# Patient Record
Sex: FEMALE | Race: WHITE
[De-identification: names, ages, dates, MRNs, and addresses within clinical notes are randomized per-mention and may not be internally consistent; named-entity substitution may affect disease eponyms.]

---

## 2018-11-15 ENCOUNTER — HOSPITAL ENCOUNTER (INPATIENT)
Dept: HOSPITAL 62 - ER | Age: 58
LOS: 7 days | Discharge: HOME | DRG: 387 | End: 2018-11-22
Attending: INTERNAL MEDICINE | Admitting: INTERNAL MEDICINE
Payer: COMMERCIAL

## 2018-11-15 DIAGNOSIS — K57.90: ICD-10-CM

## 2018-11-15 DIAGNOSIS — Z86.010: ICD-10-CM

## 2018-11-15 DIAGNOSIS — E87.6: ICD-10-CM

## 2018-11-15 DIAGNOSIS — Z91.040: ICD-10-CM

## 2018-11-15 DIAGNOSIS — F32.9: ICD-10-CM

## 2018-11-15 DIAGNOSIS — D64.9: ICD-10-CM

## 2018-11-15 DIAGNOSIS — F17.200: ICD-10-CM

## 2018-11-15 DIAGNOSIS — L40.9: ICD-10-CM

## 2018-11-15 DIAGNOSIS — Z90.710: ICD-10-CM

## 2018-11-15 DIAGNOSIS — Z91.048: ICD-10-CM

## 2018-11-15 DIAGNOSIS — Z90.49: ICD-10-CM

## 2018-11-15 DIAGNOSIS — K51.911: Primary | ICD-10-CM

## 2018-11-15 LAB
ADD MANUAL DIFF: NO
ALBUMIN SERPL-MCNC: 3.5 G/DL (ref 3.5–5)
ALP SERPL-CCNC: 151 U/L (ref 38–126)
ALT SERPL-CCNC: 23 U/L (ref 9–52)
ANION GAP SERPL CALC-SCNC: 14 MMOL/L (ref 5–19)
APPEARANCE UR: (no result)
APTT PPP: (no result) S
AST SERPL-CCNC: 27 U/L (ref 14–36)
BASOPHILS # BLD AUTO: 0.1 10^3/UL (ref 0–0.2)
BASOPHILS NFR BLD AUTO: 0.4 % (ref 0–2)
BILIRUB DIRECT SERPL-MCNC: 0.3 MG/DL (ref 0–0.4)
BILIRUB SERPL-MCNC: 0.7 MG/DL (ref 0.2–1.3)
BILIRUB UR QL STRIP: NEGATIVE
BUN SERPL-MCNC: 8 MG/DL (ref 7–20)
CALCIUM: 9 MG/DL (ref 8.4–10.2)
CHLORIDE SERPL-SCNC: 89 MMOL/L (ref 98–107)
CO2 SERPL-SCNC: 37 MMOL/L (ref 22–30)
EOSINOPHIL # BLD AUTO: 0 10^3/UL (ref 0–0.6)
EOSINOPHIL NFR BLD AUTO: 0.3 % (ref 0–6)
ERYTHROCYTE [DISTWIDTH] IN BLOOD BY AUTOMATED COUNT: 12.5 % (ref 11.5–14)
GLUCOSE SERPL-MCNC: 108 MG/DL (ref 75–110)
GLUCOSE UR STRIP-MCNC: NEGATIVE MG/DL
HCT VFR BLD CALC: 40.2 % (ref 36–47)
HGB BLD-MCNC: 13.9 G/DL (ref 12–15.5)
KETONES UR STRIP-MCNC: NEGATIVE MG/DL
LIPASE SERPL-CCNC: 50.4 U/L (ref 23–300)
LIPASE SERPL-CCNC: 50.9 U/L (ref 23–300)
LYMPHOCYTES # BLD AUTO: 1.4 10^3/UL (ref 0.5–4.7)
LYMPHOCYTES NFR BLD AUTO: 8.3 % (ref 13–45)
MCH RBC QN AUTO: 29.5 PG (ref 27–33.4)
MCHC RBC AUTO-ENTMCNC: 34.5 G/DL (ref 32–36)
MCV RBC AUTO: 86 FL (ref 80–97)
MONOCYTES # BLD AUTO: 1.2 10^3/UL (ref 0.1–1.4)
MONOCYTES NFR BLD AUTO: 6.8 % (ref 3–13)
NEUTROPHILS # BLD AUTO: 14.4 10^3/UL (ref 1.7–8.2)
NEUTS SEG NFR BLD AUTO: 84.2 % (ref 42–78)
NITRITE UR QL STRIP: NEGATIVE
PH UR STRIP: 5 [PH] (ref 5–9)
PLATELET # BLD: 524 10^3/UL (ref 150–450)
POTASSIUM SERPL-SCNC: 3 MMOL/L (ref 3.6–5)
PROT SERPL-MCNC: 7 G/DL (ref 6.3–8.2)
PROT UR STRIP-MCNC: 100 MG/DL
RBC # BLD AUTO: 4.71 10^6/UL (ref 3.72–5.28)
SODIUM SERPL-SCNC: 140.4 MMOL/L (ref 137–145)
SP GR UR STRIP: 1.02
TOTAL CELLS COUNTED % (AUTO): 100 %
UROBILINOGEN UR-MCNC: 4 MG/DL (ref ?–2)
WBC # BLD AUTO: 17 10^3/UL (ref 4–10.5)

## 2018-11-15 PROCEDURE — S0119 ONDANSETRON 4 MG: HCPCS

## 2018-11-15 PROCEDURE — 83605 ASSAY OF LACTIC ACID: CPT

## 2018-11-15 PROCEDURE — 36415 COLL VENOUS BLD VENIPUNCTURE: CPT

## 2018-11-15 PROCEDURE — 83690 ASSAY OF LIPASE: CPT

## 2018-11-15 PROCEDURE — 83735 ASSAY OF MAGNESIUM: CPT

## 2018-11-15 PROCEDURE — 45331 SIGMOIDOSCOPY AND BIOPSY: CPT

## 2018-11-15 PROCEDURE — 87045 FECES CULTURE AEROBIC BACT: CPT

## 2018-11-15 PROCEDURE — 87493 C DIFF AMPLIFIED PROBE: CPT

## 2018-11-15 PROCEDURE — 80048 BASIC METABOLIC PNL TOTAL CA: CPT

## 2018-11-15 PROCEDURE — 96375 TX/PRO/DX INJ NEW DRUG ADDON: CPT

## 2018-11-15 PROCEDURE — 99285 EMERGENCY DEPT VISIT HI MDM: CPT

## 2018-11-15 PROCEDURE — 88305 TISSUE EXAM BY PATHOLOGIST: CPT

## 2018-11-15 PROCEDURE — 85027 COMPLETE CBC AUTOMATED: CPT

## 2018-11-15 PROCEDURE — 80053 COMPREHEN METABOLIC PANEL: CPT

## 2018-11-15 PROCEDURE — 87205 SMEAR GRAM STAIN: CPT

## 2018-11-15 PROCEDURE — 96365 THER/PROPH/DIAG IV INF INIT: CPT

## 2018-11-15 PROCEDURE — 85025 COMPLETE CBC W/AUTO DIFF WBC: CPT

## 2018-11-15 PROCEDURE — 87040 BLOOD CULTURE FOR BACTERIA: CPT

## 2018-11-15 PROCEDURE — S0028 INJECTION, FAMOTIDINE, 20 MG: HCPCS

## 2018-11-15 PROCEDURE — 96366 THER/PROPH/DIAG IV INF ADDON: CPT

## 2018-11-15 PROCEDURE — 74177 CT ABD & PELVIS W/CONTRAST: CPT

## 2018-11-15 PROCEDURE — 74018 RADEX ABDOMEN 1 VIEW: CPT

## 2018-11-15 PROCEDURE — 81001 URINALYSIS AUTO W/SCOPE: CPT

## 2018-11-15 RX ADMIN — PIPERACILLIN AND TAZOBACTAM SCH MLS/HR: 3; .375 INJECTION, POWDER, LYOPHILIZED, FOR SOLUTION INTRAVENOUS; PARENTERAL at 22:18

## 2018-11-15 RX ADMIN — SODIUM CHLORIDE, SODIUM LACTATE, POTASSIUM CHLORIDE, AND CALCIUM CHLORIDE PRN MLS/HR: .6; .31; .03; .02 INJECTION, SOLUTION INTRAVENOUS at 21:56

## 2018-11-15 RX ADMIN — FAMOTIDINE SCH MG: 10 INJECTION INTRAVENOUS at 21:56

## 2018-11-15 RX ADMIN — GABAPENTIN SCH MG: 300 CAPSULE ORAL at 18:58

## 2018-11-15 RX ADMIN — FAMOTIDINE SCH: 10 INJECTION INTRAVENOUS at 18:55

## 2018-11-15 RX ADMIN — Medication SCH MG: at 22:18

## 2018-11-15 NOTE — ER DOCUMENT REPORT
ED General





- General


Chief Complaint: Fever


Stated Complaint: ABDOMINAL PAIN


Time Seen by Provider: 11/15/18 11:18


TRAVEL OUTSIDE OF THE [place] IN LAST 30 DAYS: No





- HPI


Notes: 





Patient is a 58-year-old female that presents to the emergency department for 

chief complaint of left lower quadrant abdominal pain.


Patient referred to ER by PCP for IV antibiotics.  Patient has had pain in her 

left lower quadrant since 10/27/18.  She was seen by her primary care doctor 

and told she had colitis.  She has been taking Cipro and Flagyl for the past 2 

days with no improvement of her symptoms.  She reports that sharp left lower 

quadrant pain that has been constant since onset.  Pain is worse with eating.  

She denies relieving factors.  She reports multiple episodes of diarrhea daily.

  She denies history of C. difficile or recent antibiotics and admission to the 

hospital.  She has been febrile and took Tylenol just prior to arrival.





Past Medical History: Negative





Past Surgical History: Colonoscopy





Social History: Denies drugs alcohol and tobacco





Family History: Reviewed and noncontributory for presenting illness





Allergies: Reviewed, see documented allergy list.








REVIEW OF SYSTEMS:





CONSTITUTIONAL : 





No fever





No chills





No diaphoresis





No recent illness





EENT:





No vision changes





No congestion





No sore throat  





CARDIOVASCULAR:





No chest pain





No palpitations





RESPIRATORY:





No shortness of breath





No cough





No difficulty breathing





GASTROINTESTINAL: 





 abdominal pain





 nausea





No vomiting





diarrhea





GENITOURINARY:





No dysuria





No hematuria





No difficulty urinating





MUSCULOSKELETAL:





No back pain





No leg pain





No arm pain





SKIN:  





No rashes





No lesions





LYMPHATIC: 





No swollen, enlarged glands.





NEUROLOGICAL: 





No lightheadedness





No headache





No weakness





No paresthesias





PSYCHIATRIC:





No anxiety





No depression 








PHYSICAL EXAMINATION:





Vital signs reviewed, nursing noted reviewed.





GENERAL: Well-appearing, well-nourished and in no acute distress.





HEAD: Atraumatic, normocephalic.





EYES: Eyes appear normal, extraocular movements intact, sclera anicteric, 

conjunctiva are normal.





ENT: nares patent, oropharynx clear without exudates.  Moist mucous membranes.





NECK: Normal range of motion, supple without lymphadenopathy





LUNGS: Breath sounds clear to auscultation bilaterally and equal.  No wheezes 

rales or rhonchi.





HEART: Regular rate and rhythm without murmurs





ABDOMEN: Soft, left upper and lower quadrant tenderness, normoactive bowel 

sounds.  No rebound, guarding, or rigidity. No masses appreciated.





EXTREMITIES: Nontender, good range of motion, no pitting or edema. 





NEUROLOGICAL: No focal neurological deficits. Moves all extremities 

spontaneously Motor and sensory grossly intact on exam.





PSYCH: Normal mood, normal affect.





SKIN: Warm, Dry, normal turgor, no rashes or lesions noted on exposed skin











- Related Data


Allergies/Adverse Reactions: 


 





latex [Latex] Allergy (Verified 11/15/18 11:00)


 itching


nickel [Nickel] Allergy (Verified 11/15/18 11:00)


 ITCHY RASH


sulfamethoxazole [From Bactrim] Allergy (Verified 11/15/18 11:00)


 red rash


trimethoprim [From Bactrim] Allergy (Verified 11/15/18 11:00)


 red rash


MERCURY Allergy (Uncoded 11/15/18 11:00)


 ITCHY RASH











Past Medical History





- Social History


Smoking Status: Current Some Day Smoker


Chew tobacco use (# tins/day): No


Frequency of alcohol use: None


Drug Abuse: None


Family History: Reviewed & Not Pertinent


Patient has suicidal ideation: No


Patient has homicidal ideation: No





- Past Medical History


Cardiac Medical History: 


   Denies: Hx Coronary Artery Disease, Hx Heart Attack, Hx Hypertension


Pulmonary Medical History: 


   Denies: Hx Asthma, Hx Bronchitis, Hx COPD, Hx Pneumonia


Neurological Medical History: Denies: Hx Cerebrovascular Accident, Hx Seizures


Renal/ Medical History: Denies: Hx Peritoneal Dialysis


GI Medical History: Denies: Hx Hepatitis, Hx Hiatal Hernia, Hx Ulcer


Musculoskeletal Medical History: Reports Hx Arthritis - hands/FEET, Reports Hx 

Musculoskeletal Trauma - Right knee fracture 10 years ago


Psychiatric Medical History: Reports: Hx Depression


Infectious Medical History: Denies: Hx Hepatitis


Past Surgical History: Reports: Hx Cholecystectomy, Hx Hysterectomy, Hx 

Orthopedic Surgery - left thumb.  Denies: Hx Mastectomy, Hx Open Heart Surgery





- Immunizations


Hx Diphtheria, Pertussis, Tetanus Vaccination: Yes





Review of Systems





- Review of Systems


Notes: 





Dictated





Physical Exam





- Vital signs


Vitals: 


 











Temp Pulse Resp BP Pulse Ox


 


 99.5 F   108 H  18   151/70 H  96 


 


 11/15/18 11:04  11/15/18 11:04  11/15/18 11:04  11/15/18 11:04  11/15/18 11:04














- Notes


Notes: 





Dictated





Course





- Re-evaluation


Re-evalutation: 





11/15/18 13:29


Vitals reviewed.  Nursing notes reviewed.  I reviewed patient's CT scan that 

she had obtained as an outpatient.  It showed a significant pancolitis on her 

left side.  She is failing outpatient management.  Patient is meeting sepsis 

criteria.  Blood cultures were obtained.  She was given a dose of vancomycin.  

She was started on IV hydration. potassium replaced. KUB shows no free air or 

perforation.  Patient will be admitted to the hospital for further management.  

She is in agreement with this plan and stable at time of admission.





Laboratory











  11/15/18 11/15/18 11/15/18





  11:45 11:45 11:45


 


WBC  17.0 H  


 


RBC  4.71  


 


Hgb  13.9  


 


Hct  40.2  


 


MCV  86  


 


MCH  29.5  


 


MCHC  34.5  


 


RDW  12.5  


 


Plt Count  524 H  


 


Seg Neutrophils %  84.2 H  


 


Lymphocytes %  8.3 L  


 


Monocytes %  6.8  


 


Eosinophils %  0.3  


 


Basophils %  0.4  


 


Absolute Neutrophils  14.4 H  


 


Absolute Lymphocytes  1.4  


 


Absolute Monocytes  1.2  


 


Absolute Eosinophils  0.0  


 


Absolute Basophils  0.1  


 


Sodium   140.4 


 


Potassium   3.0 L* 


 


Chloride   89 L 


 


Carbon Dioxide   37 H 


 


Anion Gap   14 


 


BUN   8 


 


Creatinine   0.43 L 


 


Est GFR ( Amer)   > 60 


 


Est GFR (Non-Af Amer)   > 60 


 


Glucose   108 


 


Calcium   9.0 


 


Total Bilirubin   0.7 


 


Direct Bilirubin   0.3 


 


Neonat Total Bilirubin   Not Reportable 


 


Neonat Direct Bilirubin   Not Reportable 


 


Neonat Indirect Bili   Not Reportable 


 


AST   27 


 


ALT   23 


 


Alkaline Phosphatase   151 H 


 


Total Protein   7.0 


 


Albumin   3.5 


 


Lipase   50.4 


 


Urine Color    DARK YELLOW


 


Urine Appearance    CLOUDY


 


Urine pH    5.0


 


Ur Specific Gravity    1.021


 


Urine Protein    100 H


 


Urine Glucose (UA)    NEGATIVE


 


Urine Ketones    NEGATIVE


 


Urine Blood    NEGATIVE


 


Urine Nitrite    NEGATIVE


 


Urine Bilirubin    NEGATIVE


 


Urine Urobilinogen    4.0 H


 


Ur Leukocyte Esterase    SMALL H


 


Urine WBC (Auto)    27


 


Urine RBC (Auto)    18


 


U Hyaline Cast (Auto)    2


 


Urine Bacteria (Auto)    2+


 


Squamous Epi Cells Auto    43


 


Urine Mucus (Auto)    MANY


 


Urine Ascorbic Acid    NEGATIVE














 





KUB X-Ray  11/15/18 11:18


IMPRESSION:  NO RADIOGRAPHIC EVIDENCE FOR ACUTE ABDOMINAL DISEASE.


 











11/15/18 14:28


Case discussed with Dr. Peña who accepted admission.  Case discussed with 

Dr. Atwood, surgery, who states he is available for colonoscopy if needed





- Vital Signs


Vital signs: 


 











Temp Pulse Resp BP Pulse Ox


 


 99.5 F   108 H  18   151/70 H  96 


 


 11/15/18 11:04  11/15/18 11:04  11/15/18 11:04  11/15/18 11:04  11/15/18 11:04














- Laboratory


Result Diagrams: 


 11/15/18 11:45





 11/15/18 11:45


Laboratory results interpreted by me: 


 











  11/15/18 11/15/18 11/15/18





  11:45 11:45 11:45


 


WBC  17.0 H  


 


Plt Count  524 H  


 


Seg Neutrophils %  84.2 H  


 


Lymphocytes %  8.3 L  


 


Absolute Neutrophils  14.4 H  


 


Potassium   3.0 L* 


 


Chloride   89 L 


 


Carbon Dioxide   37 H 


 


Creatinine   0.43 L 


 


Alkaline Phosphatase   151 H 


 


Urine Protein    100 H


 


Urine Urobilinogen    4.0 H


 


Ur Leukocyte Esterase    SMALL H














Discharge





- Discharge


Clinical Impression: 


 Colitis, Hypokalemia





Sepsis


Qualifiers:


 Sepsis type: sepsis due to unspecified organism Qualified Code(s): A41.9 - 

Sepsis, unspecified organism





Condition: Stable


Disposition: ADMITTED AS INPATIENT


Admitting Provider: Hospitalist


Unit Admitted: Telemetry


Referrals: 


JARVIS FERRER, CHEPEP-BC [NO LOCAL MD] - Follow up as needed

## 2018-11-15 NOTE — ER DOCUMENT REPORT
ED Medical Screen (RME)





- General


Chief Complaint: Fever


Stated Complaint: COLITUS


Time Seen by Provider: 11/15/18 11:18


Notes: 





58 years old female presents today with left-sided abdominal pain.  Recently 

she had a CT done which showed diffuse colitis involving the entire left side 

of the colon and sigmoid colon.


Denies any fever chills nausea vomiting.


History of psoriasis





Examination left abdominal tenderness


TRAVEL OUTSIDE OF THE [place] IN LAST 30 DAYS: No





- Related Data


Allergies/Adverse Reactions: 


 





latex [Latex] Allergy (Verified 11/15/18 11:00)


 itching


nickel [Nickel] Allergy (Verified 11/15/18 11:00)


 ITCHY RASH


sulfamethoxazole [From Bactrim] Allergy (Verified 11/15/18 11:00)


 red rash


trimethoprim [From Bactrim] Allergy (Verified 11/15/18 11:00)


 red rash


MERCURY Allergy (Uncoded 11/15/18 11:00)


 ITCHY RASH











Past Medical History





- Social History


Chew tobacco use (# tins/day): No


Frequency of alcohol use: None


Drug Abuse: None





- Past Medical History


Cardiac Medical History: 


   Denies: Hx Coronary Artery Disease, Hx Heart Attack, Hx Hypertension


Pulmonary Medical History: 


   Denies: Hx Asthma, Hx Bronchitis, Hx COPD, Hx Pneumonia


Neurological Medical History: Denies: Hx Cerebrovascular Accident, Hx Seizures


Renal/ Medical History: Denies: Hx Peritoneal Dialysis


GI Medical History: Denies: Hx Hepatitis, Hx Hiatal Hernia, Hx Ulcer


Musculoskeltal Medical History: Reports Hx Arthritis - hands/FEET, Reports Hx 

Musculoskeletal Trauma - Right knee fracture 10 years ago


Psychiatric Medical History: Reports: Hx Depression


Infectious Medical History: Denies: Hx Hepatitis


Past Surgical History: Reports: Hx Cholecystectomy, Hx Hysterectomy, Hx 

Orthopedic Surgery - left thumb.  Denies: Hx Mastectomy, Hx Open Heart Surgery





- Immunizations


Hx Diphtheria, Pertussis, Tetanus Vaccination: Yes





Physical Exam





- Vital signs


Vitals: 





 











Temp Pulse Resp BP Pulse Ox


 


 99.5 F   108 H  18   151/70 H  96 


 


 11/15/18 11:04  11/15/18 11:04  11/15/18 11:04  11/15/18 11:04  11/15/18 11:04














Course





- Vital Signs


Vital signs: 





 











Temp Pulse Resp BP Pulse Ox


 


 99.5 F   108 H  18   151/70 H  96 


 


 11/15/18 11:04  11/15/18 11:04  11/15/18 11:04  11/15/18 11:04  11/15/18 11:04














Doctor's Discharge





- Discharge


Referrals: 


JARVIS FERRER, FNP-BC [Primary Care Provider] - Follow up as needed

## 2018-11-15 NOTE — RADIOLOGY REPORT (SQ)
EXAM DESCRIPTION:  CT ABD/PELVIS WITH IV   ORAL



COMPLETED DATE/TIME:  11/15/2018 5:52 pm



REASON FOR STUDY:  abdominal pain



COMPARISON:   None.



TECHNIQUE:  CT scan of the abdomen and pelvis performed using helical scanning technique with dynamic
 intravenous contrast injection and oral contrast. Images reviewed with lung, soft tissue, and bone w
indows. Reconstructed coronal and sagittal MPR images reviewed. Delayed images for evaluation of the 
urinary system also acquired. All images stored on PACS.

All CT scanners at this facility use dose modulation, iterative reconstruction, and/or weight based d
osing when appropriate to reduce radiation dose to as low as reasonably achievable (ALARA).

[facility]: Dose Right  [facility]: CareDose    [facility]: Dose Right    [facility]: Teradose 4D    [facility]: Smart Technologies



CONTRAST TYPE AND DOSE:  contrast/concentration: Isovue 350.00 mg/ml; Total Contrast Delivered: 99.0 
ml; Total Saline Delivered: 72.0 ml



RENAL FUNCTION:  GFR > 60.



RADIATION DOSE:  CT Rad equipment meets quality standard of care and radiation dose reduction techniq
ues were employed. CTDIvol: 16.2 - 19.5 mGy. DLP: 2068 mGy-cm..



LIMITATIONS:  None.



FINDINGS:  LOWER CHEST: Mild bibasilar subsegmental atelectasis-subpleural scarring.  Small lymph nod
es in the right cardiophrenic angle.

LIVER: Normal size. No enhancing masses.  No dilated ducts.

SPLEEN: Normal size. No focal lesions.

PANCREAS: No masses identified. No significant calcifications. No adjacent inflammation or peripancre
atic fluid collections. Pancreatic duct not dilated.

GALLBLADDER: Surgically absent.

ADRENAL GLANDS: No significant masses.

RIGHT KIDNEY AND URETER: No cysts identified. No solid masses identified. No calcified stones. No hyd
ronephrosis or hydroureter.

LEFT KIDNEY AND URETER: No cysts identified. No solid masses identified. No calcified stones. No hydr
onephrosis or hydroureter.

AORTA AND VESSELS: No aneurysm. No dissection. Renal arteries, SMA, celiac without significant stenos
is.

RETROPERITONEUM: Scattered small retroperitoneal lymph nodes.

BOWEL AND PERITONEAL CAVITY: Mild wall thickening in the descending and rectosigmoid colon with mild 
adjacent inflammatory changes and  adjacent small adenopathy.  Trace free fluid.  No evidence for obs
truction.

APPENDIX: Normal.

PELVIS: No mass.  No free fluid. Unremarkable bladder.

ABDOMINAL WALL: No masses. No hernias.

BONES: No acute findings.

OTHER: No other significant finding.



IMPRESSION:  Mild wall thickening in the descending and rectosigmoid colon with mild adjacent inflamm
atory changes and adjacent small adenopathy, differential includes acute infectious process versus in
flammatory bowel disease.  Trace free fluid. No evidence for obstruction.



TECHNICAL DOCUMENTATION:  JOB ID:  [id number]

[id number]

Quality ID # [id number]: Final reports with documentation of one or more dose reduction techniques (e.g., Au
tomated exposure control, adjustment of the mA and/or kV according to patient size, use of iterative 
reconstruction technique)

 2011 [employer]- All Rights Reserved



Reading location - IP/workstation name: [email or web address]

## 2018-11-15 NOTE — PDOC CONSULTATION
History of Present Illness


Admission Date/PCP: 


  11/15/18 14:50





  DEBBIE GUTIERREZ DO





Patient complains of: Abdominal pain and diarrhea


History of Present Illness: 


JARVIS EWLCH is a 58 year old female in usual state of excellent health until 

about 3 weeks ago when she was started on antibiotics for an ear infection.  

About 3 days after starting the antibiotic she developed bloody diarrhea along 

with left lower quadrant abdominal pain.  The pain in the bloody diarrhea has 

been persistent for the past 2-1/2 weeks.  She was seen by a primary care 

physician and was placed on Cipro and Flagyl 3 days ago and since starting 

these 2 antibiotics her abdominal pain has decreased although her bloody 

diarrhea has continued.  She has developed low-grade fevers as well as anorexia 

with resultant weight loss.  She denies any prior history of gastrointestinal 

problems.  There is no family history of gastrointestinal problems, 

specifically no family history of inflammatory bowel disease.  She had a 

colonoscopy done 2 years ago which was only noteworthy for diverticulosis and 

incidental polyp.  She does suffer from psoriatic arthritis but she is not on 

any steroids.  She is on gabapentin.  No stool studies have been performed on 

her yet.  She has no history of hypertension nor diabetes but she is a longtime 

smoker.  No known history of peripheral vascular disease








Past Medical History


Cardiac Medical History: Reports: None


   Denies: Coronary Artery Disease, Myocardial Infarction, Hypertension


Pulmonary Medical History: Reports: None


   Denies: Asthma, Bronchitis, Chronic Obstructive Pulmonary Disease (COPD), 

Pneumonia


EENT Medical History: Reports: Ears - Ear infection 3 weeks ago.


Neurological Medical History: Reports: None


   Denies: Seizures


Endocrine Medical History: Reports: None


Renal/ Medical History: Reports: None


Malignancy Medical History: Reports: None


GI Medical History: Reports: Other - See history of presenting illness.


   Denies: Hepatitis, Hiatal Hernia


Musculoskeltal Medical History: Reports: Arthritis - hands/FEET


Skin Medical History: Reports: Psoriasis


Psychiatric Medical History: Reports: Depression


Hematology: 


   Denies: Anemia, Sickle Cell Disease





Past Surgical History


Past Surgical History: Reports: Cholecystectomy, Hysterectomy, Orthopedic 

Surgery - left thumb


   Denies: Amputation, Mastectomy





Social History


Smoking Status: Current Every Day Smoker


Frequency of Alcohol Use: None


Drugs: None





- Advance Directive


Resuscitation Status: Full Code





Family History


Family History: Reviewed & Not Pertinent


Parental Family History Reviewed: Yes


Children Family History Reviewed: Yes


Sibling(s) Family History Reviewed.: Yes





Medication/Allergy


Home Medications: 








Fluoxetine HCl [Prozac] 40 mg PO QAM 11/15/18 


Gabapentin [Neurontin 300 mg Capsule] 300 mg PO QPM 11/15/18 








Allergies/Adverse Reactions: 


 





latex [Latex] Allergy (Verified 11/15/18 11:00)


 itching


nickel [Nickel] Allergy (Verified 11/15/18 11:00)


 ITCHY RASH


sulfamethoxazole [From Bactrim] Allergy (Verified 11/15/18 11:00)


 red rash


trimethoprim [From Bactrim] Allergy (Verified 11/15/18 11:00)


 red rash


MERCURY Allergy (Uncoded 11/15/18 11:00)


 ITCHY RASH











Review of Systems


All systems: reviewed and no additional remarkable complaints except as stated


Constitutional: PRESENT: anorexia, fever(s)


Ears: PRESENT: as per HPI


Allergic/Immunologic: PRESENT: other - Allergic to Bactrim and mercury and 

nickel





Physical Exam


Vital Signs: 


 











Temp Pulse Resp BP Pulse Ox


 


 99.8 F   86   16   138/51 H  95 


 


 11/15/18 19:15  11/15/18 19:15  11/15/18 19:15  11/15/18 19:15  11/15/18 19:15








 Intake & Output











 11/14/18 11/15/18 11/16/18





 06:59 06:59 06:59


 


Intake Total   1050


 


Balance   1050











General appearance: PRESENT: no acute distress, cooperative


Eye exam: PRESENT: conjunctiva pink


Neck exam: PRESENT: other - Neck supple with no masses and no tenderness


Respiratory exam: PRESENT: clear to auscultation umu


Cardiovascular exam: PRESENT: RRR


Pulses: PRESENT: +2 pedal pulses bilateral


GI/Abdominal exam: PRESENT: other - Soft, nondistended, mild tenderness at the 

left lower and the left lateral mid abdomen without peritoneal signs.


Extremities exam: PRESENT: other - No swelling.  No splinter hemorrhages.


Neurological exam: PRESENT: alert, awake


Psychiatric exam: PRESENT: appropriate affect


Skin exam: PRESENT: warm





Results


Laboratory Results: 


 











  11/15/18





  17:30


 


Lactic Acid  1.1











Impressions: 


 





Abdomen/Pelvis CT  11/15/18 00:00


IMPRESSION:  Mild wall thickening in the descending and rectosigmoid colon with 

mild adjacent inflammatory changes and adjacent small adenopathy, differential 

includes acute infectious process versus inflammatory bowel disease.  Trace 

free fluid. No evidence for obstruction.


 








KUB X-Ray  11/15/18 11:18


IMPRESSION:  NO RADIOGRAPHIC EVIDENCE FOR ACUTE ABDOMINAL DISEASE.


 














Assessment & Plan





- Diagnosis


(1) Colitis


Is this a current diagnosis for this admission?: Yes   


Plan: 


Of unclear etiology.  C. difficile colitis is certainly in the differential 

diagnosis.  Await stool studies.  We will plan colonoscopy tomorrow.  Patient 

has been having persistent diarrhea with no solid bowel movements for days now 

and she would prefer not to undergo a prep and I believe we would get 

sufficient visualization without a prep.  I have discussed with the patient the 

risk and benefits of the procedure including risk of colon injury and bleeding.

## 2018-11-15 NOTE — PDOC H&P
History of Present Illness


Admission Date/PCP: 


  11/15/18 14:50





  DEBBIE GUTIERREZ DO





Patient complains of: Abdominal pain and diarrhea


History of Present Illness: 


JARVIS WELCH is a 58 year old female who presents to the emergency room due to 

acute onset of abdominal pain and bloody diarrhea.  Patient's problems started 

around October 27 after she received a prescription of doxycycline from her 

rheumatologist for psoriasis treatment.  She started having progressive lower 

abdominal pain associated with bloody diarrhea.  She had a CT scan done last 

week by her primary care physician that was positive for colitis in the left 

descending and sigmoid colon up to splenic flexure and may be extending to the 

rectum.  She was treated with oral antibiotics started by 3 days ago but 

without any improvement.  She was seen by her primary care physician today and 

was referred to the emergency room.  She had history of diverticulosis and 

colonic polyps and had a colonoscopy about 2 years ago.








Past Medical History


Cardiac Medical History: 


   Denies: Coronary Artery Disease, Myocardial Infarction, Hypertension


Pulmonary Medical History: 


   Denies: Asthma, Bronchitis, Chronic Obstructive Pulmonary Disease (COPD), 

Pneumonia


Neurological Medical History: 


   Denies: Seizures


GI Medical History: 


   Denies: Hepatitis, Hiatal Hernia


Musculoskeltal Medical History: Reports: Arthritis - hands/FEET


Skin Medical History: Reports: Psoriasis


Psychiatric Medical History: Reports: Depression


Hematology: 


   Denies: Anemia, Sickle Cell Disease





Past Surgical History


Past Surgical History: Reports: Cholecystectomy, Hysterectomy, Orthopedic 

Surgery - left thumb


   Denies: Amputation, Mastectomy





Social History


Smoking Status: Current Some Day Smoker





Family History


Family History: Reviewed & Not Pertinent


Parental Family History Reviewed: Yes


Children Family History Reviewed: Yes


Sibling(s) Family History Reviewed.: Yes





Medication/Allergy


Home Medications: 








Fluoxetine HCl [Prozac] 40 mg PO QAM 11/15/18 


Gabapentin [Neurontin 300 mg Capsule] 300 mg PO QPM 11/15/18 








Allergies/Adverse Reactions: 


 





latex [Latex] Allergy (Verified 11/15/18 11:00)


 itching


nickel [Nickel] Allergy (Verified 11/15/18 11:00)


 ITCHY RASH


sulfamethoxazole [From Bactrim] Allergy (Verified 11/15/18 11:00)


 red rash


trimethoprim [From Bactrim] Allergy (Verified 11/15/18 11:00)


 red rash


MERCURY Allergy (Uncoded 11/15/18 11:00)


 ITCHY RASH











Review of Systems


All systems: reviewed and no additional remarkable complaints except as stated





Physical Exam


Vital Signs: 


 











Temp Pulse Resp BP Pulse Ox


 


 99.5 F   108 H  18   151/70 H  96 


 


 11/15/18 11:04  11/15/18 11:04  11/15/18 11:04  11/15/18 11:04  11/15/18 11:04








 Intake & Output











 11/14/18 11/15/18 11/16/18





 06:59 06:59 06:59


 


Intake Total   1050


 


Balance   1050











General appearance: PRESENT: no acute distress, cooperative, obese


Head exam: PRESENT: atraumatic, normocephalic


Eye exam: PRESENT: EOMI.  ABSENT: conjunctival injection


Ear exam: ABSENT: bleeding, drainage


Mouth exam: PRESENT: neck supple, tongue midline


Throat exam: ABSENT: post pharyngeal erythema


Neck exam: ABSENT: JVD, meningismus, tenderness, tracheostomy


Respiratory exam: PRESENT: clear to auscultation umu.  ABSENT: accessory muscle 

use


Cardiovascular exam: PRESENT: RRR.  ABSENT: diastolic murmur, systolic murmur


Pulses: PRESENT: normal radial pulses


GI/Abdominal exam: PRESENT: normal bowel sounds, soft, tenderness.  ABSENT: 

ascites


Rectal exam: PRESENT: deferred


Extremities exam: ABSENT: joint swelling, pedal edema


Musculoskeletal exam: ABSENT: normal inspection, tenderness


Neurological exam: PRESENT: alert, altered, awake, oriented to person, oriented 

to place, oriented to time, oriented to situation


Psychiatric exam: PRESENT: anxious.  ABSENT: agitated, homicidal ideation, 

suicidal ideation





Results


Impressions: 


 





KUB X-Ray  11/15/18 11:18


IMPRESSION:  NO RADIOGRAPHIC EVIDENCE FOR ACUTE ABDOMINAL DISEASE.


 














Assessment & Plan





- Diagnosis


(1) Colitis


Is this a current diagnosis for this admission?: Yes   


Plan: 


Patient failed outpatient treatment


We will get CT scan with oral and IV contrast


Will allow for ice chips only


We will start IV Zosyn and p.o. vancomycin


Check for C. difficile and check stool culture


Consult surgery for possible colonoscopy








(2) Hypokalemia


Is this a current diagnosis for this admission?: Yes   


Plan: 


Received supplements in the emergency room


Monitor potassium levels








(3) Depression


Is this a current diagnosis for this admission?: Yes   


Plan: 


Continue Prozac and gabapentin

## 2018-11-15 NOTE — RADIOLOGY REPORT (SQ)
EXAM DESCRIPTION:  KUB/ABDOMEN (SINGLE VIEW)



COMPLETED DATE/TIME:  11/15/2018 12:17 pm



REASON FOR STUDY:  Abdominal pain



COMPARISON:  None.



NUMBER OF VIEWS:  One view.



TECHNIQUE:   Supine radiographic image of the abdomen acquired.



LIMITATIONS:  None.



FINDINGS:  BOWEL GAS PATTERN: High attenuation contents within nondilated ascending and transverse co
laura, probably bismuth or oral contrast.

CALCIFICATIONS: No suspicious calcifications.

SOFT TISSUES: No gross mass or suggestion of organomegaly.

HARDWARE: Clips right upper quadrant.

BONES: No bone lesions or fracture.

OTHER: No other significant finding.



IMPRESSION:  NO RADIOGRAPHIC EVIDENCE FOR ACUTE ABDOMINAL DISEASE.



Reading location - IP/workstation name: [email or web address]

## 2018-11-16 LAB
ADD MANUAL DIFF: NO
ANION GAP SERPL CALC-SCNC: 10 MMOL/L (ref 5–19)
BASOPHILS # BLD AUTO: 0.1 10^3/UL (ref 0–0.2)
BASOPHILS NFR BLD AUTO: 0.4 % (ref 0–2)
BUN SERPL-MCNC: 6 MG/DL (ref 7–20)
CALCIUM: 7.9 MG/DL (ref 8.4–10.2)
CHLORIDE SERPL-SCNC: 97 MMOL/L (ref 98–107)
CO2 SERPL-SCNC: 33 MMOL/L (ref 22–30)
EOSINOPHIL # BLD AUTO: 0 10^3/UL (ref 0–0.6)
EOSINOPHIL NFR BLD AUTO: 0.2 % (ref 0–6)
ERYTHROCYTE [DISTWIDTH] IN BLOOD BY AUTOMATED COUNT: 12.5 % (ref 11.5–14)
GLUCOSE SERPL-MCNC: 97 MG/DL (ref 75–110)
HCT VFR BLD CALC: 33.7 % (ref 36–47)
HGB BLD-MCNC: 11.6 G/DL (ref 12–15.5)
LYMPHOCYTES # BLD AUTO: 1.1 10^3/UL (ref 0.5–4.7)
LYMPHOCYTES NFR BLD AUTO: 7.7 % (ref 13–45)
MCH RBC QN AUTO: 29.6 PG (ref 27–33.4)
MCHC RBC AUTO-ENTMCNC: 34.5 G/DL (ref 32–36)
MCV RBC AUTO: 86 FL (ref 80–97)
MONOCYTES # BLD AUTO: 1.1 10^3/UL (ref 0.1–1.4)
MONOCYTES NFR BLD AUTO: 7.9 % (ref 3–13)
NEUTROPHILS # BLD AUTO: 11.5 10^3/UL (ref 1.7–8.2)
NEUTS SEG NFR BLD AUTO: 83.8 % (ref 42–78)
PLATELET # BLD: 403 10^3/UL (ref 150–450)
POTASSIUM SERPL-SCNC: 2.9 MMOL/L (ref 3.6–5)
RBC # BLD AUTO: 3.93 10^6/UL (ref 3.72–5.28)
SODIUM SERPL-SCNC: 140.2 MMOL/L (ref 137–145)
TOTAL CELLS COUNTED % (AUTO): 100 %
WBC # BLD AUTO: 13.8 10^3/UL (ref 4–10.5)

## 2018-11-16 PROCEDURE — 0DBN8ZX EXCISION OF SIGMOID COLON, VIA NATURAL OR ARTIFICIAL OPENING ENDOSCOPIC, DIAGNOSTIC: ICD-10-PCS | Performed by: SURGERY

## 2018-11-16 RX ADMIN — MIDAZOLAM HYDROCHLORIDE ONE MG: 1 INJECTION, SOLUTION INTRAMUSCULAR; INTRAVENOUS at 10:29

## 2018-11-16 RX ADMIN — MIDAZOLAM HYDROCHLORIDE ONE MG: 1 INJECTION, SOLUTION INTRAMUSCULAR; INTRAVENOUS at 10:24

## 2018-11-16 RX ADMIN — PIPERACILLIN AND TAZOBACTAM SCH MLS/HR: 3; .375 INJECTION, POWDER, LYOPHILIZED, FOR SOLUTION INTRAVENOUS; PARENTERAL at 03:18

## 2018-11-16 RX ADMIN — PIPERACILLIN AND TAZOBACTAM SCH MLS/HR: 3; .375 INJECTION, POWDER, LYOPHILIZED, FOR SOLUTION INTRAVENOUS; PARENTERAL at 15:52

## 2018-11-16 RX ADMIN — PIPERACILLIN AND TAZOBACTAM SCH MLS/HR: 3; .375 INJECTION, POWDER, LYOPHILIZED, FOR SOLUTION INTRAVENOUS; PARENTERAL at 11:36

## 2018-11-16 RX ADMIN — FENTANYL CITRATE ONE MCG: 50 INJECTION INTRAMUSCULAR; INTRAVENOUS at 10:22

## 2018-11-16 RX ADMIN — POTASSIUM CHLORIDE SCH MLS/HR: 29.8 INJECTION, SOLUTION INTRAVENOUS at 08:17

## 2018-11-16 RX ADMIN — MIDAZOLAM HYDROCHLORIDE ONE MG: 1 INJECTION, SOLUTION INTRAMUSCULAR; INTRAVENOUS at 10:26

## 2018-11-16 RX ADMIN — FENTANYL CITRATE ONE MCG: 50 INJECTION INTRAMUSCULAR; INTRAVENOUS at 10:16

## 2018-11-16 RX ADMIN — FENTANYL CITRATE ONE MCG: 50 INJECTION INTRAMUSCULAR; INTRAVENOUS at 10:20

## 2018-11-16 RX ADMIN — FAMOTIDINE SCH: 10 INJECTION INTRAVENOUS at 11:50

## 2018-11-16 RX ADMIN — FLUOXETINE SCH: 20 CAPSULE ORAL at 08:46

## 2018-11-16 RX ADMIN — SODIUM CHLORIDE, SODIUM LACTATE, POTASSIUM CHLORIDE, AND CALCIUM CHLORIDE PRN MLS/HR: .6; .31; .03; .02 INJECTION, SOLUTION INTRAVENOUS at 18:58

## 2018-11-16 RX ADMIN — MIDAZOLAM HYDROCHLORIDE ONE MG: 1 INJECTION, SOLUTION INTRAMUSCULAR; INTRAVENOUS at 10:18

## 2018-11-16 RX ADMIN — Medication SCH MG: at 03:18

## 2018-11-16 RX ADMIN — POTASSIUM CHLORIDE SCH MLS/HR: 29.8 INJECTION, SOLUTION INTRAVENOUS at 16:33

## 2018-11-16 RX ADMIN — MIDAZOLAM HYDROCHLORIDE ONE MG: 1 INJECTION, SOLUTION INTRAMUSCULAR; INTRAVENOUS at 10:14

## 2018-11-16 RX ADMIN — GABAPENTIN SCH MG: 300 CAPSULE ORAL at 18:17

## 2018-11-16 RX ADMIN — Medication SCH: at 10:47

## 2018-11-16 RX ADMIN — POTASSIUM CHLORIDE SCH MLS/HR: 29.8 INJECTION, SOLUTION INTRAVENOUS at 13:38

## 2018-11-16 RX ADMIN — PIPERACILLIN AND TAZOBACTAM SCH MLS/HR: 3; .375 INJECTION, POWDER, LYOPHILIZED, FOR SOLUTION INTRAVENOUS; PARENTERAL at 21:17

## 2018-11-16 RX ADMIN — FAMOTIDINE SCH MG: 10 INJECTION INTRAVENOUS at 21:17

## 2018-11-16 RX ADMIN — ENOXAPARIN SODIUM SCH: 40 INJECTION SUBCUTANEOUS at 09:18

## 2018-11-16 NOTE — PDOC PROGRESS REPORT
Subjective


Progress Note for:: 11/16/18


Subjective:: 





I saw the patient before she had a colonoscopy.  She was tolerating ice chips 

with no vomiting.  She continued to have diarrhea.  She is afebrile and white 

count improved.  C. difficile testing is negative.  Colonoscopy was done later 

and I discussed with Dr. Schuster, apparently she had hemorrhagic edematous 

circumferential colitis.


Reason For Visit: 


ABDOMINAL PAIN








Physical Exam


Vital Signs: 


 











Temp Pulse Resp BP Pulse Ox


 


 98.8 F   90   16   136/73 H  92 


 


 11/16/18 08:56  11/16/18 10:55  11/16/18 10:55  11/16/18 10:55  11/16/18 10:55








 Intake & Output











 11/15/18 11/16/18 11/17/18





 06:59 06:59 06:59


 


Intake Total  3805 585


 


Balance  3805 585


 


Weight  202 lb 6.15 oz 











General appearance: PRESENT: no acute distress, cooperative, obese


Head exam: PRESENT: atraumatic, normocephalic


Eye exam: PRESENT: EOMI, PERRLA.  ABSENT: scleral icterus


Ear exam: ABSENT: bleeding, drainage


Mouth exam: PRESENT: neck supple, tongue midline


Neck exam: ABSENT: meningismus, tenderness, tracheostomy


Respiratory exam: PRESENT: clear to auscultation umu.  ABSENT: accessory muscle 

use


Cardiovascular exam: PRESENT: RRR.  ABSENT: diastolic murmur, systolic murmur


Pulses: PRESENT: normal radial pulses


GI/Abdominal exam: PRESENT: normal bowel sounds, soft, tenderness.  ABSENT: 

ascites


Rectal exam: PRESENT: deferred


Neurological exam: PRESENT: alert, altered, awake, oriented to person, oriented 

to place, oriented to time, oriented to situation


Psychiatric exam: PRESENT: anxious.  ABSENT: agitated, homicidal ideation, 

suicidal ideation


Skin exam: PRESENT: dry, normal color.  ABSENT: abrasion, erythema





Results


Laboratory Results: 


 





 11/16/18 06:23 





 11/16/18 06:23 





 











  11/15/18 11/16/18 11/16/18





  17:30 06:23 06:23


 


WBC   13.8 H 


 


RBC   3.93 


 


Hgb   11.6 L D 


 


Hct   33.7 L 


 


MCV   86 


 


MCH   29.6 


 


MCHC   34.5 


 


RDW   12.5 


 


Plt Count   403 


 


Seg Neutrophils %   83.8 H 


 


Lymphocytes %   7.7 L 


 


Monocytes %   7.9 


 


Eosinophils %   0.2 


 


Basophils %   0.4 


 


Absolute Neutrophils   11.5 H 


 


Absolute Lymphocytes   1.1 


 


Absolute Monocytes   1.1 


 


Absolute Eosinophils   0.0 


 


Absolute Basophils   0.1 


 


Sodium    140.2


 


Potassium    2.9 L*


 


Chloride    97 L


 


Carbon Dioxide    33 H


 


Anion Gap    10


 


BUN    6 L


 


Creatinine    0.54


 


Est GFR ( Amer)    > 60


 


Est GFR (Non-Af Amer)    > 60


 


Glucose    97


 


Lactic Acid  1.1  


 


Calcium    7.9 L


 


Magnesium    1.7











Impressions: 


 





Abdomen/Pelvis CT  11/15/18 00:00


IMPRESSION:  Mild wall thickening in the descending and rectosigmoid colon with 

mild adjacent inflammatory changes and adjacent small adenopathy, differential 

includes acute infectious process versus inflammatory bowel disease.  Trace 

free fluid. No evidence for obstruction.


 








KUB X-Ray  11/15/18 11:18


IMPRESSION:  NO RADIOGRAPHIC EVIDENCE FOR ACUTE ABDOMINAL DISEASE.


 














Assessment & Plan





- Diagnosis


(1) Colitis


Is this a current diagnosis for this admission?: Yes   


Plan: 


Patient failed outpatient treatment


Reviewed CT scan with oral and IV contrast


Colonoscopy report shows severe hemorrhagic edematous circumferential colitis


We will try clear liquids today


Continue IV Zosyn and stop p.o. vancomycin since C. difficile test is negative 

and no pseudomembranous colitis


Follow stool culture











(2) Hypokalemia


Is this a current diagnosis for this admission?: Yes   


Plan: 


Continue to replace and monitor potassium levels








(3) Depression


Is this a current diagnosis for this admission?: Yes   


Plan: 


Continue Prozac and gabapentin

## 2018-11-16 NOTE — OPERATIVE REPORT
Operative Report


DATE OF SURGERY: 11/16/18


PREOPERATIVE DIAGNOSIS: Severe colitis left colon


POSTOPERATIVE DIAGNOSIS: Severe acute hemorrhagic colitis left colon.  

Thrombosed left lateral external hemorrhoid


OPERATION: Flexible sigmoidoscopy to 30 cm from the anal verge; cold mucosa 

biopsy sigmoid colon


SURGEON: KRISTIN BRYANT


ANESTHESIA: Moderate Sedation


TISSUE REMOVED OR ALTERED: bx


COMPLICATIONS: 





none


ESTIMATED BLOOD LOSS: scant


INTRAOPERATIVE FINDINGS: see below


PROCEDURE: 





Patient was taken to the preop holding area to the main endoscopy suite fifth 

floor conscious sedation was induced.  Patient placed in left lateral decubitus 

position.





Surgical plan surgical timeout conducted.





Rectal exam revealed multiple external hemorrhoids, edematous.  In the left 

lateral position was a partially thrombosed external hemorrhoid with mildly 

excoriated overlying skin.  Rectal exam revealed no palpable pathology in the 

anorectal canal.





The flexible pediatric colonoscope was advanced to the anorectal canal to 

approximately 30 cm from the anal verge.  The findings were significant for 

diffuse, hemorrhagic, edematous, circumferential colitis.  Because of the 

friability of the tissue, and patient discomfort, I did not advance the scope 

through the narrowed lumen of the sigmoid colon.  Cold forceps biopsy was 

obtained from the rectosigmoid area at approximately 25 cm.  Bleeding was 

minimal.  Specimen was sent for permanent analysis.





Scope was withdrawn for the patient's anal rectal canal.  She tolerated 

procedure well.





Recommendations:





1.  Supportive therapy, replace potassium, consider truncating antibiotics





2.  Clostridium difficile titers negative ; I do not believe this is ischemic 

colitis








3.  We will sign off at this time.  Reconsult if necessary.

## 2018-11-17 LAB
ADD MANUAL DIFF: NO
ANION GAP SERPL CALC-SCNC: 12 MMOL/L (ref 5–19)
BASOPHILS # BLD AUTO: 0 10^3/UL (ref 0–0.2)
BASOPHILS NFR BLD AUTO: 0.2 % (ref 0–2)
BUN SERPL-MCNC: 7 MG/DL (ref 7–20)
CALCIUM: 8.2 MG/DL (ref 8.4–10.2)
CHLORIDE SERPL-SCNC: 100 MMOL/L (ref 98–107)
CO2 SERPL-SCNC: 29 MMOL/L (ref 22–30)
EOSINOPHIL # BLD AUTO: 0 10^3/UL (ref 0–0.6)
EOSINOPHIL NFR BLD AUTO: 0.3 % (ref 0–6)
ERYTHROCYTE [DISTWIDTH] IN BLOOD BY AUTOMATED COUNT: 12.4 % (ref 11.5–14)
GLUCOSE SERPL-MCNC: 77 MG/DL (ref 75–110)
HCT VFR BLD CALC: 33.1 % (ref 36–47)
HGB BLD-MCNC: 11.3 G/DL (ref 12–15.5)
LYMPHOCYTES # BLD AUTO: 1.5 10^3/UL (ref 0.5–4.7)
LYMPHOCYTES NFR BLD AUTO: 10.3 % (ref 13–45)
MCH RBC QN AUTO: 29.4 PG (ref 27–33.4)
MCHC RBC AUTO-ENTMCNC: 34 G/DL (ref 32–36)
MCV RBC AUTO: 86 FL (ref 80–97)
MONOCYTES # BLD AUTO: 1 10^3/UL (ref 0.1–1.4)
MONOCYTES NFR BLD AUTO: 6.6 % (ref 3–13)
NEUTROPHILS # BLD AUTO: 11.9 10^3/UL (ref 1.7–8.2)
NEUTS SEG NFR BLD AUTO: 82.6 % (ref 42–78)
PLATELET # BLD: 429 10^3/UL (ref 150–450)
POTASSIUM SERPL-SCNC: 3.8 MMOL/L (ref 3.6–5)
RBC # BLD AUTO: 3.84 10^6/UL (ref 3.72–5.28)
SODIUM SERPL-SCNC: 141 MMOL/L (ref 137–145)
TOTAL CELLS COUNTED % (AUTO): 100 %
WBC # BLD AUTO: 14.4 10^3/UL (ref 4–10.5)

## 2018-11-17 RX ADMIN — GABAPENTIN SCH MG: 300 CAPSULE ORAL at 17:39

## 2018-11-17 RX ADMIN — FAMOTIDINE SCH MG: 10 INJECTION INTRAVENOUS at 21:21

## 2018-11-17 RX ADMIN — SODIUM CHLORIDE, SODIUM LACTATE, POTASSIUM CHLORIDE, AND CALCIUM CHLORIDE PRN MLS/HR: .6; .31; .03; .02 INJECTION, SOLUTION INTRAVENOUS at 02:53

## 2018-11-17 RX ADMIN — MORPHINE SULFATE PRN MG: 10 INJECTION INTRAMUSCULAR; INTRAVENOUS; SUBCUTANEOUS at 17:40

## 2018-11-17 RX ADMIN — ENOXAPARIN SODIUM SCH MG: 40 INJECTION SUBCUTANEOUS at 09:39

## 2018-11-17 RX ADMIN — FAMOTIDINE SCH MG: 10 INJECTION INTRAVENOUS at 09:39

## 2018-11-17 RX ADMIN — FLUOXETINE SCH MG: 20 CAPSULE ORAL at 08:29

## 2018-11-17 RX ADMIN — PIPERACILLIN AND TAZOBACTAM SCH MLS/HR: 3; .375 INJECTION, POWDER, LYOPHILIZED, FOR SOLUTION INTRAVENOUS; PARENTERAL at 16:19

## 2018-11-17 RX ADMIN — PIPERACILLIN AND TAZOBACTAM SCH MLS/HR: 3; .375 INJECTION, POWDER, LYOPHILIZED, FOR SOLUTION INTRAVENOUS; PARENTERAL at 20:06

## 2018-11-17 RX ADMIN — PIPERACILLIN AND TAZOBACTAM SCH MLS/HR: 3; .375 INJECTION, POWDER, LYOPHILIZED, FOR SOLUTION INTRAVENOUS; PARENTERAL at 02:53

## 2018-11-17 RX ADMIN — PIPERACILLIN AND TAZOBACTAM SCH MLS/HR: 3; .375 INJECTION, POWDER, LYOPHILIZED, FOR SOLUTION INTRAVENOUS; PARENTERAL at 08:33

## 2018-11-17 NOTE — PDOC PROGRESS REPORT
Subjective


Progress Note for:: 11/17/18


Subjective:: 





C. difficile testing is negative.  Colonoscopy was done yesterday and showed 

hemorrhagic edematous circumferential colitis.  Biopsy was done but results are 

pending.  She is still complaining of frequent watery diarrhea as well as left-

sided abdominal pain.  She is afebrile but her white count slightly worsened 

from yesterday.  She is tolerating clear liquid diet.  Oral vancomycin was 

stopped yesterday and she is continued on Zosyn.


Reason For Visit: 


ABDOMINAL PAIN








Physical Exam


Vital Signs: 


 











Temp Pulse Resp BP Pulse Ox


 


 98.9 F   81   16   149/75 H  98 


 


 11/17/18 08:26  11/17/18 08:26  11/17/18 08:26  11/17/18 08:26  11/17/18 08:26








 Intake & Output











 11/16/18 11/17/18 11/18/18





 06:59 06:59 06:59


 


Intake Total 3805 2951 


 


Balance 3805 2951 


 


Weight 202 lb 6.15 oz 203 lb 11.314 oz 











General appearance: PRESENT: no acute distress, cooperative


Head exam: PRESENT: atraumatic, normocephalic


Eye exam: PRESENT: EOMI, PERRLA


Mouth exam: PRESENT: neck supple, tongue midline


Neck exam: ABSENT: meningismus, tenderness, thyromegaly, tracheostomy


Respiratory exam: PRESENT: clear to auscultation umu.  ABSENT: accessory muscle 

use


Cardiovascular exam: PRESENT: diastolic murmur, RRR, systolic murmur


GI/Abdominal exam: PRESENT: normal bowel sounds, soft, tenderness.  ABSENT: 

ascites


Neurological exam: PRESENT: alert, altered, awake, oriented to person, oriented 

to place, oriented to time, oriented to situation


Psychiatric exam: PRESENT: appropriate affect.  ABSENT: agitated, anxious, 

homicidal ideation, suicidal ideation





Results


Laboratory Results: 


 





 11/17/18 05:14 





 11/17/18 05:14 





 











  11/17/18 11/17/18





  05:14 05:14


 


WBC  14.4 H 


 


RBC  3.84 


 


Hgb  11.3 L 


 


Hct  33.1 L 


 


MCV  86 


 


MCH  29.4 


 


MCHC  34.0 


 


RDW  12.4 


 


Plt Count  429 


 


Seg Neutrophils %  82.6 H 


 


Lymphocytes %  10.3 L 


 


Monocytes %  6.6 


 


Eosinophils %  0.3 


 


Basophils %  0.2 


 


Absolute Neutrophils  11.9 H 


 


Absolute Lymphocytes  1.5 


 


Absolute Monocytes  1.0 


 


Absolute Eosinophils  0.0 


 


Absolute Basophils  0.0 


 


Sodium   141.0


 


Potassium   3.8


 


Chloride   100


 


Carbon Dioxide   29


 


Anion Gap   12


 


BUN   7


 


Creatinine   0.45 L


 


Est GFR ( Amer)   > 60


 


Est GFR (Non-Af Amer)   > 60


 


Glucose   77


 


Calcium   8.2 L











Impressions: 


 





Abdomen/Pelvis CT  11/15/18 00:00


IMPRESSION:  Mild wall thickening in the descending and rectosigmoid colon with 

mild adjacent inflammatory changes and adjacent small adenopathy, differential 

includes acute infectious process versus inflammatory bowel disease.  Trace 

free fluid. No evidence for obstruction.


 








KUB X-Ray  11/15/18 11:18


IMPRESSION:  NO RADIOGRAPHIC EVIDENCE FOR ACUTE ABDOMINAL DISEASE.


 














Assessment & Plan





- Diagnosis


(1) Colitis


Is this a current diagnosis for this admission?: Yes   


Plan: 


Patient failed outpatient treatment


CT scan with oral and IV contrast was done on admission and showed mild wall 

thickening of the descending and sigmoid colon with adjacent inflammatory 

response


Colonoscopy report shows severe hemorrhagic edematous circumferential colitis.  

Biopsy is pending.


Continue clear liquid diet


Continue IV Zosyn 


C. difficile test was negative and we stopped p.o. vancomycin yesterday 


Follow stool culture











(2) Hypokalemia


Is this a current diagnosis for this admission?: Yes   


Plan: 


Improved after replacement


Continue to monitor








(3) Depression


Is this a current diagnosis for this admission?: Yes   


Plan: 


Continue Prozac and gabapentin

## 2018-11-18 LAB
ADD MANUAL DIFF: NO
ANION GAP SERPL CALC-SCNC: 11 MMOL/L (ref 5–19)
BASOPHILS # BLD AUTO: 0 10^3/UL (ref 0–0.2)
BASOPHILS NFR BLD AUTO: 0.2 % (ref 0–2)
BUN SERPL-MCNC: 4 MG/DL (ref 7–20)
CALCIUM: 8.1 MG/DL (ref 8.4–10.2)
CHLORIDE SERPL-SCNC: 95 MMOL/L (ref 98–107)
CO2 SERPL-SCNC: 32 MMOL/L (ref 22–30)
EOSINOPHIL # BLD AUTO: 0 10^3/UL (ref 0–0.6)
EOSINOPHIL NFR BLD AUTO: 0.4 % (ref 0–6)
ERYTHROCYTE [DISTWIDTH] IN BLOOD BY AUTOMATED COUNT: 12.6 % (ref 11.5–14)
GLUCOSE SERPL-MCNC: 94 MG/DL (ref 75–110)
HCT VFR BLD CALC: 32.5 % (ref 36–47)
HGB BLD-MCNC: 11.3 G/DL (ref 12–15.5)
LYMPHOCYTES # BLD AUTO: 1.3 10^3/UL (ref 0.5–4.7)
LYMPHOCYTES NFR BLD AUTO: 10.5 % (ref 13–45)
MCH RBC QN AUTO: 29.5 PG (ref 27–33.4)
MCHC RBC AUTO-ENTMCNC: 34.8 G/DL (ref 32–36)
MCV RBC AUTO: 85 FL (ref 80–97)
MONOCYTES # BLD AUTO: 0.8 10^3/UL (ref 0.1–1.4)
MONOCYTES NFR BLD AUTO: 6.3 % (ref 3–13)
NEUTROPHILS # BLD AUTO: 10.4 10^3/UL (ref 1.7–8.2)
NEUTS SEG NFR BLD AUTO: 82.6 % (ref 42–78)
PLATELET # BLD: 422 10^3/UL (ref 150–450)
POTASSIUM SERPL-SCNC: 3 MMOL/L (ref 3.6–5)
RBC # BLD AUTO: 3.83 10^6/UL (ref 3.72–5.28)
SODIUM SERPL-SCNC: 138.2 MMOL/L (ref 137–145)
TOTAL CELLS COUNTED % (AUTO): 100 %
WBC # BLD AUTO: 12.5 10^3/UL (ref 4–10.5)

## 2018-11-18 RX ADMIN — FAMOTIDINE SCH MG: 10 INJECTION INTRAVENOUS at 21:59

## 2018-11-18 RX ADMIN — PIPERACILLIN AND TAZOBACTAM SCH MLS/HR: 3; .375 INJECTION, POWDER, LYOPHILIZED, FOR SOLUTION INTRAVENOUS; PARENTERAL at 21:59

## 2018-11-18 RX ADMIN — POTASSIUM CHLORIDE SCH MLS/HR: 29.8 INJECTION, SOLUTION INTRAVENOUS at 18:58

## 2018-11-18 RX ADMIN — POTASSIUM CHLORIDE SCH MLS/HR: 29.8 INJECTION, SOLUTION INTRAVENOUS at 11:46

## 2018-11-18 RX ADMIN — FLUOXETINE SCH MG: 20 CAPSULE ORAL at 09:20

## 2018-11-18 RX ADMIN — PIPERACILLIN AND TAZOBACTAM SCH MLS/HR: 3; .375 INJECTION, POWDER, LYOPHILIZED, FOR SOLUTION INTRAVENOUS; PARENTERAL at 09:22

## 2018-11-18 RX ADMIN — PIPERACILLIN AND TAZOBACTAM SCH MLS/HR: 3; .375 INJECTION, POWDER, LYOPHILIZED, FOR SOLUTION INTRAVENOUS; PARENTERAL at 04:38

## 2018-11-18 RX ADMIN — MORPHINE SULFATE PRN MG: 10 INJECTION INTRAMUSCULAR; INTRAVENOUS; SUBCUTANEOUS at 09:45

## 2018-11-18 RX ADMIN — POTASSIUM CHLORIDE SCH MLS/HR: 29.8 INJECTION, SOLUTION INTRAVENOUS at 14:24

## 2018-11-18 RX ADMIN — FAMOTIDINE SCH MG: 10 INJECTION INTRAVENOUS at 09:21

## 2018-11-18 RX ADMIN — ENOXAPARIN SODIUM SCH MG: 40 INJECTION SUBCUTANEOUS at 09:21

## 2018-11-18 RX ADMIN — PIPERACILLIN AND TAZOBACTAM SCH MLS/HR: 3; .375 INJECTION, POWDER, LYOPHILIZED, FOR SOLUTION INTRAVENOUS; PARENTERAL at 17:11

## 2018-11-18 RX ADMIN — GABAPENTIN SCH MG: 300 CAPSULE ORAL at 17:33

## 2018-11-18 NOTE — PDOC PROGRESS REPORT
Subjective


Progress Note for:: 11/18/18


Subjective:: 





Patient is still complaining of watery diarrhea but actually improved from 

presentation.  She still has some left-sided abdominal pain but this is also 

better.  She is afebrile and her white count is improving.


Reason For Visit: 


ABDOMINAL PAIN








Physical Exam


Vital Signs: 


 











Temp Pulse Resp BP Pulse Ox


 


 98.2 F   75   19   138/58 H  99 


 


 11/17/18 23:15  11/17/18 23:15  11/17/18 23:15  11/17/18 23:15  11/17/18 23:15








 Intake & Output











 11/17/18 11/18/18 11/19/18





 06:59 06:59 06:59


 


Intake Total 2951 2316 


 


Balance 2951 2316 


 


Weight 203 lb 11.314 oz 214 lb 15.211 oz 











General appearance: PRESENT: no acute distress, cooperative, obese


Head exam: PRESENT: atraumatic, normocephalic


Eye exam: PRESENT: EOMI, PERRLA


Mouth exam: PRESENT: neck supple, tongue midline


Neck exam: ABSENT: meningismus, tracheostomy


Respiratory exam: PRESENT: clear to auscultation umu.  ABSENT: accessory muscle 

use


Cardiovascular exam: PRESENT: diastolic murmur, RRR, systolic murmur


Pulses: PRESENT: normal radial pulses


GI/Abdominal exam: PRESENT: normal bowel sounds, soft, tenderness - Left-sided.

  ABSENT: ascites


Rectal exam: PRESENT: deferred


Neurological exam: PRESENT: alert, altered, awake, oriented to person, oriented 

to place, oriented to time, oriented to situation





Results


Laboratory Results: 


 





 11/18/18 05:45 





 11/18/18 05:45 





 











  11/18/18 11/18/18





  05:45 05:45


 


WBC   12.5 H


 


RBC   3.83


 


Hgb   11.3 L


 


Hct   32.5 L


 


MCV   85


 


MCH   29.5


 


MCHC   34.8


 


RDW   12.6


 


Plt Count   422


 


Seg Neutrophils %   82.6 H


 


Lymphocytes %   10.5 L


 


Monocytes %   6.3


 


Eosinophils %   0.4


 


Basophils %   0.2


 


Absolute Neutrophils   10.4 H


 


Absolute Lymphocytes   1.3


 


Absolute Monocytes   0.8


 


Absolute Eosinophils   0.0


 


Absolute Basophils   0.0


 


Sodium  138.2 


 


Potassium  3.0 L* 


 


Chloride  95 L 


 


Carbon Dioxide  32 H 


 


Anion Gap  11 


 


BUN  4 L 


 


Creatinine  0.39 L 


 


Est GFR ( Amer)  > 60 


 


Est GFR (Non-Af Amer)  > 60 


 


Glucose  94 


 


Calcium  8.1 L 


 


Magnesium  1.7 








 





11/15/18 21:50   Stool - Stool    - Final


11/15/18 21:50   Stool - Stool   Stool Culture - Final


                            NO SALMONELLA, SHIGELLA, CAMPYLOBACTER, OR E.COLI 

[id number]


                            RECOVERED.


                            NEGATIVE FOR SHIGA TOXINS 1&2.








Impressions: 


 





Abdomen/Pelvis CT  11/15/18 00:00


IMPRESSION:  Mild wall thickening in the descending and rectosigmoid colon with 

mild adjacent inflammatory changes and adjacent small adenopathy, differential 

includes acute infectious process versus inflammatory bowel disease.  Trace 

free fluid. No evidence for obstruction.


 








KUB X-Ray  11/15/18 11:18


IMPRESSION:  NO RADIOGRAPHIC EVIDENCE FOR ACUTE ABDOMINAL DISEASE.


 














Assessment & Plan





- Diagnosis


(1) Colitis


Is this a current diagnosis for this admission?: Yes   


Plan: 


Patient failed outpatient treatment


CT scan with oral and IV contrast was done on admission and showed mild wall 

thickening of the descending and sigmoid colon with adjacent inflammatory 

response


Colonoscopy report shows severe hemorrhagic edematous circumferential colitis.  

Biopsy is pending.


Continue clear liquid diet


Continue IV Zosyn 


C. difficile test was negative and we stopped p.o. vancomycin November 16, 2018


Stool culture is negative











(2) Hypokalemia


Is this a current diagnosis for this admission?: Yes   


Plan: 


Replace with IV potassium and monitor levels in the morning


Also check magnesium level








(3) Depression


Is this a current diagnosis for this admission?: Yes   


Plan: 


Continue Prozac and gabapentin

## 2018-11-19 LAB
ALBUMIN SERPL-MCNC: 2.5 G/DL (ref 3.5–5)
ALP SERPL-CCNC: 102 U/L (ref 38–126)
ALT SERPL-CCNC: 30 U/L (ref 9–52)
ANION GAP SERPL CALC-SCNC: 13 MMOL/L (ref 5–19)
AST SERPL-CCNC: 32 U/L (ref 14–36)
BILIRUB DIRECT SERPL-MCNC: 0.3 MG/DL (ref 0–0.4)
BILIRUB SERPL-MCNC: 0.6 MG/DL (ref 0.2–1.3)
BUN SERPL-MCNC: 4 MG/DL (ref 7–20)
CALCIUM: 8 MG/DL (ref 8.4–10.2)
CHLORIDE SERPL-SCNC: 96 MMOL/L (ref 98–107)
CO2 SERPL-SCNC: 30 MMOL/L (ref 22–30)
ERYTHROCYTE [DISTWIDTH] IN BLOOD BY AUTOMATED COUNT: 12.2 % (ref 11.5–14)
GLUCOSE SERPL-MCNC: 90 MG/DL (ref 75–110)
HCT VFR BLD CALC: 31.4 % (ref 36–47)
HGB BLD-MCNC: 11 G/DL (ref 12–15.5)
MCH RBC QN AUTO: 29.5 PG (ref 27–33.4)
MCHC RBC AUTO-ENTMCNC: 34.9 G/DL (ref 32–36)
MCV RBC AUTO: 85 FL (ref 80–97)
PLATELET # BLD: 412 10^3/UL (ref 150–450)
POTASSIUM SERPL-SCNC: 3.1 MMOL/L (ref 3.6–5)
PROT SERPL-MCNC: 5.3 G/DL (ref 6.3–8.2)
RBC # BLD AUTO: 3.71 10^6/UL (ref 3.72–5.28)
SODIUM SERPL-SCNC: 138.5 MMOL/L (ref 137–145)
WBC # BLD AUTO: 11.6 10^3/UL (ref 4–10.5)

## 2018-11-19 RX ADMIN — PIPERACILLIN AND TAZOBACTAM SCH MLS/HR: 3; .375 INJECTION, POWDER, LYOPHILIZED, FOR SOLUTION INTRAVENOUS; PARENTERAL at 09:57

## 2018-11-19 RX ADMIN — PIPERACILLIN AND TAZOBACTAM SCH MLS/HR: 3; .375 INJECTION, POWDER, LYOPHILIZED, FOR SOLUTION INTRAVENOUS; PARENTERAL at 17:01

## 2018-11-19 RX ADMIN — PIPERACILLIN AND TAZOBACTAM SCH MLS/HR: 3; .375 INJECTION, POWDER, LYOPHILIZED, FOR SOLUTION INTRAVENOUS; PARENTERAL at 23:03

## 2018-11-19 RX ADMIN — FLUOXETINE SCH MG: 20 CAPSULE ORAL at 05:11

## 2018-11-19 RX ADMIN — DEXAMETHASONE SODIUM PHOSPHATE PRN MG: 10 INJECTION INTRAMUSCULAR; INTRAVENOUS at 17:05

## 2018-11-19 RX ADMIN — POTASSIUM CHLORIDE SCH MLS/HR: 29.8 INJECTION, SOLUTION INTRAVENOUS at 09:57

## 2018-11-19 RX ADMIN — FAMOTIDINE SCH MG: 10 INJECTION INTRAVENOUS at 23:03

## 2018-11-19 RX ADMIN — POTASSIUM CHLORIDE SCH: 29.8 INJECTION, SOLUTION INTRAVENOUS at 18:32

## 2018-11-19 RX ADMIN — SODIUM CHLORIDE, SODIUM LACTATE, POTASSIUM CHLORIDE, AND CALCIUM CHLORIDE PRN MLS/HR: .6; .31; .03; .02 INJECTION, SOLUTION INTRAVENOUS at 02:39

## 2018-11-19 RX ADMIN — ENOXAPARIN SODIUM SCH MG: 40 INJECTION SUBCUTANEOUS at 09:57

## 2018-11-19 RX ADMIN — PIPERACILLIN AND TAZOBACTAM SCH MLS/HR: 3; .375 INJECTION, POWDER, LYOPHILIZED, FOR SOLUTION INTRAVENOUS; PARENTERAL at 02:38

## 2018-11-19 RX ADMIN — GABAPENTIN SCH MG: 300 CAPSULE ORAL at 17:02

## 2018-11-19 RX ADMIN — MORPHINE SULFATE PRN MG: 10 INJECTION INTRAMUSCULAR; INTRAVENOUS; SUBCUTANEOUS at 17:14

## 2018-11-19 RX ADMIN — POTASSIUM CHLORIDE SCH MLS/HR: 29.8 INJECTION, SOLUTION INTRAVENOUS at 14:20

## 2018-11-19 RX ADMIN — FAMOTIDINE SCH MG: 10 INJECTION INTRAVENOUS at 09:58

## 2018-11-19 NOTE — PDOC PROGRESS REPORT
Subjective


Progress Note for:: 11/19/18


Subjective:: 





Still has watery diarrhea sometimes with a tinge of blood but overall 

improving.  Pain also improved.  Her white count improving.  She is tolerating 

clear liquid and asking to advance her diet.


Reason For Visit: 


ABDOMINAL PAIN








Physical Exam


Vital Signs: 


 











Temp Pulse Resp BP Pulse Ox


 


 99.8 F   86   18   146/72 H  98 


 


 11/19/18 07:46  11/19/18 07:46  11/19/18 07:46  11/19/18 07:46  11/19/18 07:46








 Intake & Output











 11/18/18 11/19/18 11/20/18





 06:59 06:59 06:59


 


Intake Total 2316 1367 


 


Balance 2316 1367 


 


Weight 214 lb 15.211 oz 214 lb 15.211 oz 











General appearance: PRESENT: no acute distress, cooperative


Head exam: PRESENT: atraumatic, normocephalic


Eye exam: PRESENT: EOMI, PERRLA


Ear exam: ABSENT: bleeding, drainage


Mouth exam: PRESENT: neck supple, tongue midline


Neck exam: ABSENT: meningismus, tracheostomy


Respiratory exam: PRESENT: clear to auscultation umu.  ABSENT: accessory muscle 

use


Cardiovascular exam: PRESENT: RRR.  ABSENT: diastolic murmur, systolic murmur


Pulses: PRESENT: normal radial pulses


GI/Abdominal exam: PRESENT: normal bowel sounds, soft, tenderness.  ABSENT: 

ascites


Rectal exam: PRESENT: deferred


Neurological exam: PRESENT: alert, altered, awake, oriented to person, oriented 

to place, oriented to time, oriented to situation


Psychiatric exam: PRESENT: anxious.  ABSENT: agitated, homicidal ideation, 

suicidal ideation





Results


Laboratory Results: 


 





 11/19/18 05:49 





 11/19/18 05:49 





 











  11/19/18 11/19/18





  05:49 05:49


 


WBC  11.6 H 


 


RBC  3.71 L 


 


Hgb  11.0 L 


 


Hct  31.4 L 


 


MCV  85 


 


MCH  29.5 


 


MCHC  34.9 


 


RDW  12.2 


 


Plt Count  412 


 


Sodium   138.5


 


Potassium   3.1 L


 


Chloride   96 L


 


Carbon Dioxide   30


 


Anion Gap   13


 


BUN   4 L


 


Creatinine   0.38 L


 


Est GFR ( Amer)   > 60


 


Est GFR (Non-Af Amer)   > 60


 


Glucose   90


 


Calcium   8.0 L


 


Magnesium   1.6


 


Total Bilirubin   0.6


 


AST   32


 


ALT   30


 


Alkaline Phosphatase   102


 


Total Protein   5.3 L


 


Albumin   2.5 L











Impressions: 


 





Abdomen/Pelvis CT  11/15/18 00:00


IMPRESSION:  Mild wall thickening in the descending and rectosigmoid colon with 

mild adjacent inflammatory changes and adjacent small adenopathy, differential 

includes acute infectious process versus inflammatory bowel disease.  Trace 

free fluid. No evidence for obstruction.


 








KUB X-Ray  11/15/18 11:18


IMPRESSION:  NO RADIOGRAPHIC EVIDENCE FOR ACUTE ABDOMINAL DISEASE.


 














Assessment & Plan





- Diagnosis


(1) Colitis


Is this a current diagnosis for this admission?: Yes   


Plan: 


Patient failed outpatient treatment


CT scan with oral and IV contrast was done on admission and showed mild wall 

thickening of the descending and sigmoid colon with adjacent inflammatory 

response


Colonoscopy report shows severe hemorrhagic edematous circumferential colitis.  

Biopsy is still pending.


Continue IV Zosyn 


C. difficile test was negative and we stopped p.o. vancomycin November 16, 2018


Stool culture is negative


Will advance to full liquid diet











(2) Hypokalemia


Is this a current diagnosis for this admission?: Yes   


Plan: 


Replace with IV potassium and monitor levels in the morning











(3) Depression


Is this a current diagnosis for this admission?: Yes   


Plan: 


Continue Prozac and gabapentin

## 2018-11-20 LAB
ANION GAP SERPL CALC-SCNC: 9 MMOL/L (ref 5–19)
BUN SERPL-MCNC: 3 MG/DL (ref 7–20)
CALCIUM: 7.9 MG/DL (ref 8.4–10.2)
CHLORIDE SERPL-SCNC: 96 MMOL/L (ref 98–107)
CO2 SERPL-SCNC: 33 MMOL/L (ref 22–30)
ERYTHROCYTE [DISTWIDTH] IN BLOOD BY AUTOMATED COUNT: 12.5 % (ref 11.5–14)
GLUCOSE SERPL-MCNC: 98 MG/DL (ref 75–110)
HCT VFR BLD CALC: 30.4 % (ref 36–47)
HGB BLD-MCNC: 10.5 G/DL (ref 12–15.5)
MCH RBC QN AUTO: 29.3 PG (ref 27–33.4)
MCHC RBC AUTO-ENTMCNC: 34.6 G/DL (ref 32–36)
MCV RBC AUTO: 85 FL (ref 80–97)
PLATELET # BLD: 429 10^3/UL (ref 150–450)
POTASSIUM SERPL-SCNC: 3.1 MMOL/L (ref 3.6–5)
RBC # BLD AUTO: 3.58 10^6/UL (ref 3.72–5.28)
SODIUM SERPL-SCNC: 138.4 MMOL/L (ref 137–145)
WBC # BLD AUTO: 12.8 10^3/UL (ref 4–10.5)

## 2018-11-20 RX ADMIN — DEXAMETHASONE SODIUM PHOSPHATE PRN MG: 10 INJECTION INTRAMUSCULAR; INTRAVENOUS at 17:38

## 2018-11-20 RX ADMIN — PIPERACILLIN AND TAZOBACTAM SCH MLS/HR: 3; .375 INJECTION, POWDER, LYOPHILIZED, FOR SOLUTION INTRAVENOUS; PARENTERAL at 22:44

## 2018-11-20 RX ADMIN — DEXAMETHASONE SODIUM PHOSPHATE PRN MG: 10 INJECTION INTRAMUSCULAR; INTRAVENOUS at 10:34

## 2018-11-20 RX ADMIN — FLUOXETINE SCH MG: 20 CAPSULE ORAL at 05:51

## 2018-11-20 RX ADMIN — PIPERACILLIN AND TAZOBACTAM SCH MLS/HR: 3; .375 INJECTION, POWDER, LYOPHILIZED, FOR SOLUTION INTRAVENOUS; PARENTERAL at 10:59

## 2018-11-20 RX ADMIN — FAMOTIDINE SCH MG: 10 INJECTION INTRAVENOUS at 22:43

## 2018-11-20 RX ADMIN — MORPHINE SULFATE PRN MG: 10 INJECTION INTRAMUSCULAR; INTRAVENOUS; SUBCUTANEOUS at 10:33

## 2018-11-20 RX ADMIN — POTASSIUM CHLORIDE SCH: 29.8 INJECTION, SOLUTION INTRAVENOUS at 19:20

## 2018-11-20 RX ADMIN — FAMOTIDINE SCH MG: 10 INJECTION INTRAVENOUS at 10:58

## 2018-11-20 RX ADMIN — POTASSIUM CHLORIDE SCH MLS/HR: 29.8 INJECTION, SOLUTION INTRAVENOUS at 13:33

## 2018-11-20 RX ADMIN — GABAPENTIN SCH MG: 300 CAPSULE ORAL at 17:13

## 2018-11-20 RX ADMIN — PIPERACILLIN AND TAZOBACTAM SCH MLS/HR: 3; .375 INJECTION, POWDER, LYOPHILIZED, FOR SOLUTION INTRAVENOUS; PARENTERAL at 03:58

## 2018-11-20 RX ADMIN — ENOXAPARIN SODIUM SCH MG: 40 INJECTION SUBCUTANEOUS at 10:58

## 2018-11-20 RX ADMIN — PIPERACILLIN AND TAZOBACTAM SCH MLS/HR: 3; .375 INJECTION, POWDER, LYOPHILIZED, FOR SOLUTION INTRAVENOUS; PARENTERAL at 17:04

## 2018-11-20 NOTE — PDOC PROGRESS REPORT
Subjective


Progress Note for:: 11/20/18


Subjective:: 


The patient is a 58-year-old female with a past medical history of arthritis, 

psoriasis, and depression who was admitted 11/15/2018 for colitis that failed 

outpatient treatment.





Patient was seen on morning rounds.  She was found resting in bed comfortably 

on room air.  She reports that she continues to have loose stools; twice since 

early this morning.  She noted today that she again had bright red blood with 

her bowel movements.  She continues to endorse generalized abdominal discomfort

, anorexia, and nausea, though no emesis.  She did tolerate a clear liquid diet 

this morning.


She denies fever, chills, chest pain, palpitations, dyspnea, orthopnea, urinary 

symptoms.


She has no new questions or concerns today.


No concerns per nursing.


Reason For Visit: 


ABDOMINAL PAIN








Physical Exam


Vital Signs: 


 











Temp Pulse Resp BP Pulse Ox


 


 98.6 F   78   19   138/60 H  94 


 


 11/20/18 11:39  11/20/18 11:39  11/20/18 11:39  11/20/18 11:39  11/20/18 11:39








 Intake & Output











 11/19/18 11/20/18 11/21/18





 06:59 06:59 06:59


 


Intake Total 1367 1357 1132


 


Balance 1367 1357 1132


 


Weight 97.5 kg 97.5 kg 











General appearance: PRESENT: no acute distress, well-developed, well-nourished


Head exam: PRESENT: atraumatic, normocephalic


Eye exam: PRESENT: conjunctiva pink, EOMI, PERRLA.  ABSENT: scleral icterus


Ear exam: PRESENT: normal external ear exam


Mouth exam: PRESENT: moist, tongue midline


Neck exam: ABSENT: carotid bruit, JVD, lymphadenopathy, thyromegaly


Respiratory exam: PRESENT: clear to auscultation umu.  ABSENT: rales, rhonchi, 

wheezes


Cardiovascular exam: PRESENT: RRR.  ABSENT: diastolic murmur, rubs, systolic 

murmur


Pulses: PRESENT: normal dorsalis pedis pul


Vascular exam: PRESENT: normal capillary refill


GI/Abdominal exam: PRESENT: normal bowel sounds, soft, tenderness.  ABSENT: 

distended, guarding, mass, organolmegaly, rebound


Rectal exam: PRESENT: deferred


Extremities exam: PRESENT: full ROM, +1 edema - BLE.  ABSENT: calf tenderness, 

clubbing, pedal edema


Neurological exam: PRESENT: alert, awake, oriented to person, oriented to place

, oriented to time, oriented to situation, CN II-XII grossly intact.  ABSENT: 

motor sensory deficit


Psychiatric exam: PRESENT: appropriate affect, normal mood.  ABSENT: homicidal 

ideation, suicidal ideation


Skin exam: PRESENT: dry, intact, warm.  ABSENT: cyanosis, rash





Results


Laboratory Results: 


 





 11/20/18 05:06 





 11/20/18 05:06 





 











  11/20/18 11/20/18





  05:06 05:06


 


WBC  12.8 H 


 


RBC  3.58 L 


 


Hgb  10.5 L 


 


Hct  30.4 L 


 


MCV  85 


 


MCH  29.3 


 


MCHC  34.6 


 


RDW  12.5 


 


Plt Count  429 


 


Sodium   138.4


 


Potassium   3.1 L


 


Chloride   96 L


 


Carbon Dioxide   33 H


 


Anion Gap   9


 


BUN   3 L


 


Creatinine   0.43 L


 


Est GFR ( Amer)   > 60


 


Est GFR (Non-Af Amer)   > 60


 


Glucose   98


 


Calcium   7.9 L


 


Magnesium   1.6











Impressions: 


 





Abdomen/Pelvis CT  11/15/18 00:00


IMPRESSION:  Mild wall thickening in the descending and rectosigmoid colon with 

mild adjacent inflammatory changes and adjacent small adenopathy, differential 

includes acute infectious process versus inflammatory bowel disease.  Trace 

free fluid. No evidence for obstruction.


 








KUB X-Ray  11/15/18 11:18


IMPRESSION:  NO RADIOGRAPHIC EVIDENCE FOR ACUTE ABDOMINAL DISEASE.


 














Assessment & Plan





- Diagnosis


(1) Ulcerative colitis


Qualifiers: 


   Ulcerative colitis location: unspecified ulcerative colitis location   

Digestive disease complication type: with rectal bleeding   Qualified Code(s): 

K51.911 - Ulcerative colitis, unspecified with rectal bleeding   


Is this a current diagnosis for this admission?: Yes   


Plan: 


The patient was admitted following outpatient failure for treatment of colitis 

with antibiotics.


CT scan with oral and IV contrast showed mild wall thickening of the descending 

and sigmoid colon with adjacent inflammatory response.


Colonoscopy revealed severe hemorrhagic edematous circumferential colitis.


Biopsy resulted moderate active ulcerative colitis.


C. difficile was negative; p.o. vancomycin was discontinued 11/16/2018.


Stool cultures negative.


Blood cultures have no growth at 4 days.





The patient is admitted to the medical floor.


She was provided gentle IV fluids which were discontinued overnight due to the 

development of bilateral lower extremity edema which is subsequently improving.


The patient was empirically placed on IV Zosyn.  T-max in the last 48 hours was 

99.9.  WBC trending up slightly to 12.8.  Will continue antibiotics overnight; 

if remains afebrile, will transition to p.o.  Anticipate WBC will increase 

following start of steroid therapy.


Antiemetics and analgesics as needed.


She is tolerating a full liquid diet; will advance as tolerated.


Continue to monitor daily CBC; transfuse as necessary.








(2) Anemia


Qualifiers: 


   Anemia type: other cause 


Is this a current diagnosis for this admission?: Yes   


Plan: 


Acute blood loss anemia secondary to ulcerative colitis with active GI bleeding 

and hemodilution related to IV fluids.


Hemoglobin has drifted downward from 13.9-10.5.  Patient does continue to 

endorse red blood per rectum with stools.


She is asymptomatic at this time; denies headaches, dizziness, dyspnea.  Heart 

rate stable.





IV fluids have been discontinued.


We will continue to monitor daily CBC and transfuse as necessary.








(3) Leukocytosis


Is this a current diagnosis for this admission?: Yes   


Plan: 


Secondary to #1; anticipate palpation WBCs the patient has been placed on 

steroids for confirmed ulcerative colitis.


Cultures and antibiotics as above.








(4) Depression


Is this a current diagnosis for this admission?: Yes   


Plan: 


Stable; will continue the patient's home dose Prozac and gabapentin.








(5) Hypokalemia


Is this a current diagnosis for this admission?: Yes   


Plan: 


Secondary to GI losses.


Will replace with IV potassium.


Daily chemistries.








- Time


Time Spent with patient: 15-24 minutes


Medications reviewed and adjusted accordingly: Yes


Anticipated discharge: Home


Within: within 48 hours

## 2018-11-21 LAB
ANION GAP SERPL CALC-SCNC: 13 MMOL/L (ref 5–19)
BUN SERPL-MCNC: 5 MG/DL (ref 7–20)
CALCIUM: 8.2 MG/DL (ref 8.4–10.2)
CHLORIDE SERPL-SCNC: 95 MMOL/L (ref 98–107)
CO2 SERPL-SCNC: 31 MMOL/L (ref 22–30)
ERYTHROCYTE [DISTWIDTH] IN BLOOD BY AUTOMATED COUNT: 12.6 % (ref 11.5–14)
GLUCOSE SERPL-MCNC: 121 MG/DL (ref 75–110)
HCT VFR BLD CALC: 34.1 % (ref 36–47)
HGB BLD-MCNC: 11.7 G/DL (ref 12–15.5)
MCH RBC QN AUTO: 29.3 PG (ref 27–33.4)
MCHC RBC AUTO-ENTMCNC: 34.4 G/DL (ref 32–36)
MCV RBC AUTO: 85 FL (ref 80–97)
PLATELET # BLD: 503 10^3/UL (ref 150–450)
POTASSIUM SERPL-SCNC: 3.9 MMOL/L (ref 3.6–5)
RBC # BLD AUTO: 4 10^6/UL (ref 3.72–5.28)
SODIUM SERPL-SCNC: 138.8 MMOL/L (ref 137–145)
WBC # BLD AUTO: 11.8 10^3/UL (ref 4–10.5)

## 2018-11-21 RX ADMIN — DEXAMETHASONE SODIUM PHOSPHATE PRN MG: 10 INJECTION INTRAMUSCULAR; INTRAVENOUS at 06:51

## 2018-11-21 RX ADMIN — ENOXAPARIN SODIUM SCH MG: 40 INJECTION SUBCUTANEOUS at 09:42

## 2018-11-21 RX ADMIN — GABAPENTIN SCH MG: 300 CAPSULE ORAL at 18:41

## 2018-11-21 RX ADMIN — PIPERACILLIN AND TAZOBACTAM SCH MLS/HR: 3; .375 INJECTION, POWDER, LYOPHILIZED, FOR SOLUTION INTRAVENOUS; PARENTERAL at 03:18

## 2018-11-21 RX ADMIN — PREDNISONE SCH MG: 20 TABLET ORAL at 18:41

## 2018-11-21 RX ADMIN — FAMOTIDINE SCH MG: 10 INJECTION INTRAVENOUS at 09:43

## 2018-11-21 RX ADMIN — FLUOXETINE SCH MG: 20 CAPSULE ORAL at 05:46

## 2018-11-21 RX ADMIN — FAMOTIDINE SCH MG: 20 TABLET, FILM COATED ORAL at 22:51

## 2018-11-21 RX ADMIN — PIPERACILLIN AND TAZOBACTAM SCH MLS/HR: 3; .375 INJECTION, POWDER, LYOPHILIZED, FOR SOLUTION INTRAVENOUS; PARENTERAL at 09:42

## 2018-11-21 NOTE — PDOC PROGRESS REPORT
Subjective


Progress Note for:: 11/21/18


Subjective:: 


The patient is a 58-year-old female with a past medical history of arthritis, 

psoriasis, and depression who was admitted 11/15/2018 for colitis that failed 

outpatient treatment.





Patient was seen on morning rounds.  She was found resting in bed comfortably 

on room air.  She reports that she continues to have loose stools with slight 

streaking of blood.  She continues to endorse generalized abdominal discomfort, 

anorexia, and nausea, though no emesis.  She did tolerate a [place] diet this 

morning.


She denies fever, chills, chest pain, palpitations, dyspnea, orthopnea, urinary 

symptoms.


She is concerned about beginning steroid therapy for her ulcerative colitis.  

She states that she has an eye condition has been told by her ophthalmologist 

not to use prednisone.  She requests that I speak with Dr. Oejda before 

starting her on this medication.


No concerns per nursing.


Reason For Visit: 


ABDOMINAL PAIN








Physical Exam


Vital Signs: 


 











Temp Pulse Resp BP Pulse Ox


 


 98.4 F   85   19   152/73 H  99 


 


 11/21/18 16:05  11/21/18 16:05  11/21/18 16:05  11/21/18 16:05  11/21/18 16:05








 Intake & Output











 11/20/18 11/21/18 11/22/18





 06:59 06:59 06:59


 


Intake Total 1357 2532 780


 


Output Total  350 200


 


Balance 1357 2182 580


 


Weight 97.5 kg 98.8 kg 











General appearance: PRESENT: no acute distress, cooperative, morbidly obese, 

well-developed, well-nourished


Head exam: PRESENT: atraumatic, normocephalic


Eye exam: PRESENT: conjunctiva pink, EOMI, PERRLA.  ABSENT: scleral icterus


Ear exam: PRESENT: normal external ear exam


Mouth exam: PRESENT: moist, tongue midline


Neck exam: ABSENT: carotid bruit, JVD, lymphadenopathy, thyromegaly


Respiratory exam: PRESENT: clear to auscultation umu, symmetrical, unlabored.  

ABSENT: rales, rhonchi, wheezes


Cardiovascular exam: PRESENT: RRR, +S1, +S2.  ABSENT: diastolic murmur, rubs, 

systolic murmur


Pulses: PRESENT: normal dorsalis pedis pul


Vascular exam: PRESENT: normal capillary refill


GI/Abdominal exam: PRESENT: normal bowel sounds, soft, tenderness - Generalized

; worsened to left lower quadrant.  Overall improved from yesterday.  ABSENT: 

distended, guarding, mass, organolmegaly, rebound


Rectal exam: PRESENT: deferred


Extremities exam: PRESENT: full ROM.  ABSENT: calf tenderness, clubbing, pedal 

edema


Neurological exam: PRESENT: alert, awake, oriented to person, oriented to place

, oriented to time, oriented to situation, CN II-XII grossly intact.  ABSENT: 

motor sensory deficit


Psychiatric exam: PRESENT: appropriate affect, normal mood.  ABSENT: homicidal 

ideation, suicidal ideation


Skin exam: PRESENT: dry, intact, warm.  ABSENT: cyanosis, rash





Results


Laboratory Results: 


 





 11/21/18 06:38 





 11/21/18 06:38 





 











  11/21/18 11/21/18





  06:38 06:38


 


WBC  11.8 H 


 


RBC  4.00 


 


Hgb  11.7 L 


 


Hct  34.1 L 


 


MCV  85 


 


MCH  29.3 


 


MCHC  34.4 


 


RDW  12.6 


 


Plt Count  503 H 


 


Sodium   138.8


 


Potassium   3.9


 


Chloride   95 L


 


Carbon Dioxide   31 H


 


Anion Gap   13


 


BUN   5 L


 


Creatinine   0.37 L


 


Est GFR ( Amer)   > 60


 


Est GFR (Non-Af Amer)   > 60


 


Glucose   121 H


 


Calcium   8.2 L








 





11/15/18 17:40   Blood   Blood Culture - Final


                            NO GROWTH IN 5 DAYS


11/15/18 17:30   Blood   Blood Culture - Final


                            NO GROWTH IN 5 DAYS








Impressions: 


 





Abdomen/Pelvis CT  11/15/18 00:00


IMPRESSION:  Mild wall thickening in the descending and rectosigmoid colon with 

mild adjacent inflammatory changes and adjacent small adenopathy, differential 

includes acute infectious process versus inflammatory bowel disease.  Trace 

free fluid. No evidence for obstruction.


 








KUB X-Ray  11/15/18 11:18


IMPRESSION:  NO RADIOGRAPHIC EVIDENCE FOR ACUTE ABDOMINAL DISEASE.


 














Assessment & Plan





- Diagnosis


(1) Ulcerative colitis


Qualifiers: 


   Ulcerative colitis location: unspecified ulcerative colitis location   

Digestive disease complication type: with rectal bleeding   Qualified Code(s): 

K51.911 - Ulcerative colitis, unspecified with rectal bleeding   


Is this a current diagnosis for this admission?: Yes   


Plan: 


The patient was admitted following outpatient failure for treatment of colitis 

with antibiotics.


CT scan with oral and IV contrast showed mild wall thickening of the descending 

and sigmoid colon with adjacent inflammatory response.


Colonoscopy revealed severe hemorrhagic edematous circumferential colitis.


Biopsy resulted moderate active ulcerative colitis.


C. difficile was negative; p.o. vancomycin was discontinued 11/16/2018.


Stool cultures negative.


Blood cultures have no growth at 5 days.





The patient is admitted to the medical floor.


Received Solu-Medrol yesterday; will transition to p.o prednisone today (

discussed with the patient's ophthalmology office; there is no concern 

regarding short course of steroid therapy)


The patient was empirically placed on IV Zosyn.  She has been afebrile >48 hours

, leukocytosis is improved; antibiotics are discontinued.


Antiemetics and analgesics as needed.


She is tolerating a bland diet; will advance as tolerated.








(2) Anemia


Qualifiers: 


   Anemia type: other cause 


Is this a current diagnosis for this admission?: Yes   


Plan: 


Acute blood loss anemia secondary to ulcerative colitis with active GI bleeding 

and hemodilution related to IV fluids.


Hemoglobin has drifted downward from 13.9 to 10.5; now trending up.  Patient 

does continue to endorse red blood per rectum with stools.


She is asymptomatic at this time; denies headaches, dizziness, dyspnea.  Heart 

rate stable.





IV fluids have been discontinued.


We will continue to monitor daily CBC and transfuse as necessary.








(3) Leukocytosis


Is this a current diagnosis for this admission?: Yes   


Plan: 


Secondary to #1; anticipate elevation of WBCs the patient has been placed on 

steroids for confirmed ulcerative colitis.


Cultures and antibiotics as above.








(4) Depression


Is this a current diagnosis for this admission?: Yes   


Plan: 


Stable; will continue the patient's home dose Prozac and gabapentin.








(5) Hypokalemia


Is this a current diagnosis for this admission?: Yes   


Plan: 


Resolved; secondary to GI losses.


Will replace with IV potassium.


Daily chemistries.








- Time


Time Spent with patient: 25-34 minutes


Medications reviewed and adjusted accordingly: Yes


Anticipated discharge: Home


Within: within 24 hours

## 2018-11-22 VITALS — DIASTOLIC BLOOD PRESSURE: 77 MMHG | SYSTOLIC BLOOD PRESSURE: 148 MMHG

## 2018-11-22 LAB
ANION GAP SERPL CALC-SCNC: 8 MMOL/L (ref 5–19)
BUN SERPL-MCNC: 6 MG/DL (ref 7–20)
CALCIUM: 8.2 MG/DL (ref 8.4–10.2)
CHLORIDE SERPL-SCNC: 93 MMOL/L (ref 98–107)
CO2 SERPL-SCNC: 38 MMOL/L (ref 22–30)
ERYTHROCYTE [DISTWIDTH] IN BLOOD BY AUTOMATED COUNT: 12.6 % (ref 11.5–14)
GLUCOSE SERPL-MCNC: 122 MG/DL (ref 75–110)
HCT VFR BLD CALC: 32.4 % (ref 36–47)
HGB BLD-MCNC: 11.1 G/DL (ref 12–15.5)
MCH RBC QN AUTO: 28.9 PG (ref 27–33.4)
MCHC RBC AUTO-ENTMCNC: 34.1 G/DL (ref 32–36)
MCV RBC AUTO: 85 FL (ref 80–97)
PLATELET # BLD: 571 10^3/UL (ref 150–450)
POTASSIUM SERPL-SCNC: 3.8 MMOL/L (ref 3.6–5)
RBC # BLD AUTO: 3.82 10^6/UL (ref 3.72–5.28)
SODIUM SERPL-SCNC: 138.5 MMOL/L (ref 137–145)
WBC # BLD AUTO: 13.5 10^3/UL (ref 4–10.5)

## 2018-11-22 RX ADMIN — ENOXAPARIN SODIUM SCH: 40 INJECTION SUBCUTANEOUS at 09:16

## 2018-11-22 RX ADMIN — FAMOTIDINE SCH MG: 20 TABLET, FILM COATED ORAL at 09:16

## 2018-11-22 RX ADMIN — FLUOXETINE SCH MG: 20 CAPSULE ORAL at 05:11

## 2018-11-22 RX ADMIN — PREDNISONE SCH MG: 20 TABLET ORAL at 09:16

## 2018-11-23 NOTE — PDOC DISCHARGE SUMMARY
General





- Admit/Disc Date/PCP


Admission Date/Primary Care Provider: 


  11/15/18 14:50





  DEBBIE VIC BRENDA, 





Discharge Date: 11/22/18





- Discharge Diagnosis


(1) Ulcerative colitis


Is this a current diagnosis for this admission?: Yes   


Summary: 


The patient was admitted following outpatient failure for treatment of colitis 

with antibiotics.


CT scan with oral and IV contrast showed mild wall thickening of the descending 

and sigmoid colon with adjacent inflammatory response.


Colonoscopy revealed severe hemorrhagic edematous circumferential colitis.


Biopsy resulted moderate active ulcerative colitis.


C. difficile was negative; p.o. vancomycin was discontinued 11/16/2018.


Stool cultures negative.


Blood cultures have no growth at 5 days.





The patient is admitted to the medical floor.  The patient was initially placed 

on IV Zosyn and p.o. vancomycin for treatment of colitis with concern for C. 

difficile infection.  The vancomycin was discontinued soon as the C. difficile 

PCR resulted negative.  Once the patient has been afebrile greater than 48 

hours with improved leukocytosis, her Zosyn was discontinued.


She underwent colonoscopy which revealed circumferential colitis with biopsy 

return of moderately active ulcerative colitis.


She was provided IV Solu-Medrol and transition to p.o. prednisone.  At the 

patient's request, her ophthalmologist was contacted; no concern with regard 

for short course of therapy however, if the patient does require long-term 

steroid therapy they recommend follow-up in 1 month.





The patient is stable for discharge.  Continues to have occasional loose stools 

with bloody streaking; she is advised on warning signs and red flags to prompt 

return to the emergency department for evaluation. Leukocytosis has improved, 

she remains afebrile, and she is now tolerating a bland diet.


She is provided prescriptions for prednisone and Zofran.


She is instructed to follow-up with her primary care provider within 1 week.


She is also advised to notify her rheumatologist of her recent admission and 

follow-up as directed.








(2) Anemia


Is this a current diagnosis for this admission?: Yes   


Summary: 


Acute blood loss anemia secondary to ulcerative colitis with active GI bleeding 

and hemodilution related to IV fluids.


Hemoglobin has drifted downward from 13.9 to 10.5; now trending up.  Patient 

does continue to endorse streaks of bright red blood per rectum with stools.


She is asymptomatic at this time; denies headaches, dizziness, dyspnea.  Heart 

rate stable.





Recommend follow up CBC in 1 week.








(3) Leukocytosis


Is this a current diagnosis for this admission?: Yes   


Summary: 


Secondary to # 1 and use of steroid therapy.


Cultures and antibiotics as above.








(4) Depression


Is this a current diagnosis for this admission?: Yes   


Summary: 


Stable; the patient's home medication regiment was continued.








(5) Hypokalemia


Is this a current diagnosis for this admission?: Yes   


Summary: 


Resolved; secondary to GI losses.








- Additional Information


Resuscitation Status: Full Code


Discharge Diet: As Tolerated


Discharge Activity: Activity As Tolerated


Prescriptions: 


Ondansetron [Zofran Odt 4 mg Tablet] 4 mg PO Q6HP PRN #14 tab.rapdis


 PRN Reason: 


Prednisone [Deltasone 20 mg Tablet] 40 mg PO BID 5 Days  tablet


Home Medications: 








Fluoxetine HCl [Prozac] 40 mg PO QAM 11/15/18 


Gabapentin [Neurontin 300 mg Capsule] 300 mg PO QPM 11/15/18 


Acetaminophen [Tylenol 325 mg Tablet] 650 mg PO Q6HP PRN  tablet 11/22/18 


Ondansetron [Zofran Odt 4 mg Tablet] 4 mg PO Q6HP PRN #14 tab.rapdis 11/22/18 


Prednisone [Deltasone 20 mg Tablet] 40 mg PO BID 5 Days  tablet 11/22/18 











History of Present Illness


History of Present Illness: 


Per H&P by Dr. Peña: JARVIS WELCH is a 58 year old female who presents to the 

emergency room due to acute onset of abdominal pain and bloody diarrhea.  

Patient's problems started around October 27 after she received a prescription 

of doxycycline from her rheumatologist for psoriasis treatment.  She started 

having progressive lower abdominal pain associated with bloody diarrhea.  She 

had a CT scan done last week by her primary care physician that was positive 

for colitis in the left descending and sigmoid colon up to splenic flexure and 

may be extending to the rectum.  She was treated with oral antibiotics started 

by 3 days ago but without any improvement.  She was seen by her primary care 

physician today and was referred to the emergency room.  She had history of 

diverticulosis and colonic polyps and had a colonoscopy about 2 years ago.











Physical Exam


Vital Signs: 


 











Temp Pulse Resp BP Pulse Ox


 


 98.2 F   63   16   148/77 H  98 


 


 11/22/18 07:52  11/22/18 07:52  11/22/18 07:52  11/22/18 07:52  11/22/18 07:52








 Intake & Output











 11/22/18 11/23/18 11/24/18





 06:59 06:59 06:59


 


Intake Total 1620  


 


Output Total 200  


 


Balance 1420  


 


Weight 100.1 kg  











General appearance: PRESENT: no acute distress, cooperative, obese, well-

developed, well-nourished


Head exam: PRESENT: atraumatic, normocephalic


Eye exam: PRESENT: conjunctiva pink, EOMI, PERRLA.  ABSENT: scleral icterus


Ear exam: PRESENT: normal external ear exam


Mouth exam: PRESENT: moist, tongue midline


Neck exam: ABSENT: carotid bruit, JVD, lymphadenopathy, thyromegaly


Respiratory exam: PRESENT: clear to auscultation umu.  ABSENT: rales, rhonchi, 

wheezes


Cardiovascular exam: PRESENT: RRR.  ABSENT: diastolic murmur, rubs, systolic 

murmur


Pulses: PRESENT: normal dorsalis pedis pul


Vascular exam: PRESENT: normal capillary refill


GI/Abdominal exam: PRESENT: normal bowel sounds, soft, tenderness.  ABSENT: 

distended, guarding, mass, organolmegaly, rebound


Rectal exam: PRESENT: deferred


Extremities exam: PRESENT: full ROM.  ABSENT: calf tenderness, clubbing, pedal 

edema


Neurological exam: PRESENT: alert, awake, oriented to person, oriented to place

, oriented to time, oriented to situation, CN II-XII grossly intact.  ABSENT: 

motor sensory deficit


Psychiatric exam: PRESENT: appropriate affect, normal mood.  ABSENT: homicidal 

ideation, suicidal ideation


Skin exam: PRESENT: dry, intact, warm.  ABSENT: cyanosis, rash





Results


Laboratory Results: 


 





 11/22/18 04:10 





 11/22/18 04:10 








Impressions: 


 





Abdomen/Pelvis CT  11/15/18 00:00


IMPRESSION:  Mild wall thickening in the descending and rectosigmoid colon with 

mild adjacent inflammatory changes and adjacent small adenopathy, differential 

includes acute infectious process versus inflammatory bowel disease.  Trace 

free fluid. No evidence for obstruction.


 








KUB X-Ray  11/15/18 11:18


IMPRESSION:  NO RADIOGRAPHIC EVIDENCE FOR ACUTE ABDOMINAL DISEASE.


 














Qualifiers





- *


PATIENT BEING DISCHARGED WITH ANY OF THE FOLLOWING DIAGNOSIS: No





Plan


Discharge Plan: 





Follow-up with primary care provider within 1 week.


Patient is instructed to notify her rheumatologist of her admission and 

diagnosis; follow-up as directed.


Follow-up with ophthalmologist within 1 month if she continues to required 

steroids for treatment of ulcerative colitis.


Time Spent: Less than 30 Minutes

## 2019-01-09 ENCOUNTER — HOSPITAL ENCOUNTER (INPATIENT)
Dept: HOSPITAL 62 - ER | Age: 59
LOS: 16 days | Discharge: ANOTHER HOSPITAL | DRG: 387 | End: 2019-01-25
Attending: INTERNAL MEDICINE | Admitting: INTERNAL MEDICINE
Payer: COMMERCIAL

## 2019-01-09 DIAGNOSIS — J84.10: ICD-10-CM

## 2019-01-09 DIAGNOSIS — R26.2: ICD-10-CM

## 2019-01-09 DIAGNOSIS — R35.0: ICD-10-CM

## 2019-01-09 DIAGNOSIS — Z91.81: ICD-10-CM

## 2019-01-09 DIAGNOSIS — K21.9: ICD-10-CM

## 2019-01-09 DIAGNOSIS — H40.9: ICD-10-CM

## 2019-01-09 DIAGNOSIS — R30.0: ICD-10-CM

## 2019-01-09 DIAGNOSIS — L40.9: ICD-10-CM

## 2019-01-09 DIAGNOSIS — Z91.018: ICD-10-CM

## 2019-01-09 DIAGNOSIS — Z87.891: ICD-10-CM

## 2019-01-09 DIAGNOSIS — L40.50: ICD-10-CM

## 2019-01-09 DIAGNOSIS — Z91.040: ICD-10-CM

## 2019-01-09 DIAGNOSIS — K50.114: Primary | ICD-10-CM

## 2019-01-09 DIAGNOSIS — B96.20: ICD-10-CM

## 2019-01-09 DIAGNOSIS — I87.8: ICD-10-CM

## 2019-01-09 DIAGNOSIS — R63.0: ICD-10-CM

## 2019-01-09 DIAGNOSIS — M06.9: ICD-10-CM

## 2019-01-09 DIAGNOSIS — E88.09: ICD-10-CM

## 2019-01-09 DIAGNOSIS — R53.1: ICD-10-CM

## 2019-01-09 DIAGNOSIS — B95.2: ICD-10-CM

## 2019-01-09 DIAGNOSIS — R60.1: ICD-10-CM

## 2019-01-09 DIAGNOSIS — Z91.048: ICD-10-CM

## 2019-01-09 DIAGNOSIS — E66.01: ICD-10-CM

## 2019-01-09 DIAGNOSIS — R53.81: ICD-10-CM

## 2019-01-09 DIAGNOSIS — Z88.2: ICD-10-CM

## 2019-01-09 DIAGNOSIS — Z88.3: ICD-10-CM

## 2019-01-09 DIAGNOSIS — T50.995A: ICD-10-CM

## 2019-01-09 DIAGNOSIS — F32.9: ICD-10-CM

## 2019-01-09 DIAGNOSIS — L89.152: ICD-10-CM

## 2019-01-09 LAB
ADD MANUAL DIFF: NO
ALBUMIN SERPL-MCNC: 2.6 G/DL (ref 3.5–5)
ALP SERPL-CCNC: 257 U/L (ref 38–126)
ALT SERPL-CCNC: 32 U/L (ref 9–52)
ANION GAP SERPL CALC-SCNC: 8 MMOL/L (ref 5–19)
APPEARANCE UR: (no result)
APTT PPP: (no result) S
AST SERPL-CCNC: 14 U/L (ref 14–36)
BASOPHILS # BLD AUTO: 0 10^3/UL (ref 0–0.2)
BASOPHILS NFR BLD AUTO: 0.1 % (ref 0–2)
BILIRUB DIRECT SERPL-MCNC: 0.2 MG/DL (ref 0–0.4)
BILIRUB SERPL-MCNC: 0.6 MG/DL (ref 0.2–1.3)
BILIRUB UR QL STRIP: (no result)
BUN SERPL-MCNC: 15 MG/DL (ref 7–20)
CALCIUM: 8.2 MG/DL (ref 8.4–10.2)
CHLORIDE SERPL-SCNC: 92 MMOL/L (ref 98–107)
CO2 SERPL-SCNC: 32 MMOL/L (ref 22–30)
EOSINOPHIL # BLD AUTO: 0 10^3/UL (ref 0–0.6)
EOSINOPHIL NFR BLD AUTO: 0 % (ref 0–6)
ERYTHROCYTE [DISTWIDTH] IN BLOOD BY AUTOMATED COUNT: 16.6 % (ref 11.5–14)
GLUCOSE SERPL-MCNC: 108 MG/DL (ref 75–110)
GLUCOSE UR STRIP-MCNC: NEGATIVE MG/DL
HCT VFR BLD CALC: 35.5 % (ref 36–47)
HGB BLD-MCNC: 11.8 G/DL (ref 12–15.5)
KETONES UR STRIP-MCNC: NEGATIVE MG/DL
LIPASE SERPL-CCNC: < 10 U/L (ref 23–300)
LYMPHOCYTES # BLD AUTO: 1.2 10^3/UL (ref 0.5–4.7)
LYMPHOCYTES NFR BLD AUTO: 8.5 % (ref 13–45)
MCH RBC QN AUTO: 28.9 PG (ref 27–33.4)
MCHC RBC AUTO-ENTMCNC: 33.2 G/DL (ref 32–36)
MCV RBC AUTO: 87 FL (ref 80–97)
MONOCYTES # BLD AUTO: 0.4 10^3/UL (ref 0.1–1.4)
MONOCYTES NFR BLD AUTO: 3.1 % (ref 3–13)
NEUTROPHILS # BLD AUTO: 12.1 10^3/UL (ref 1.7–8.2)
NEUTS SEG NFR BLD AUTO: 88.3 % (ref 42–78)
NITRITE UR QL STRIP: NEGATIVE
PH UR STRIP: 5 [PH] (ref 5–9)
PLATELET # BLD: 616 10^3/UL (ref 150–450)
POTASSIUM SERPL-SCNC: 4.6 MMOL/L (ref 3.6–5)
PROT SERPL-MCNC: 4.8 G/DL (ref 6.3–8.2)
PROT UR STRIP-MCNC: 100 MG/DL
RBC # BLD AUTO: 4.08 10^6/UL (ref 3.72–5.28)
SODIUM SERPL-SCNC: 132.1 MMOL/L (ref 137–145)
SP GR UR STRIP: 1.04
TOTAL CELLS COUNTED % (AUTO): 100 %
UROBILINOGEN UR-MCNC: 2 MG/DL (ref ?–2)
WBC # BLD AUTO: 13.7 10^3/UL (ref 4–10.5)

## 2019-01-09 PROCEDURE — 74177 CT ABD & PELVIS W/CONTRAST: CPT

## 2019-01-09 PROCEDURE — 85610 PROTHROMBIN TIME: CPT

## 2019-01-09 PROCEDURE — C1894 INTRO/SHEATH, NON-LASER: HCPCS

## 2019-01-09 PROCEDURE — 90471 IMMUNIZATION ADMIN: CPT

## 2019-01-09 PROCEDURE — 86701 HIV-1ANTIBODY: CPT

## 2019-01-09 PROCEDURE — 87040 BLOOD CULTURE FOR BACTERIA: CPT

## 2019-01-09 PROCEDURE — 75989 ABSCESS DRAINAGE UNDER X-RAY: CPT

## 2019-01-09 PROCEDURE — 71045 X-RAY EXAM CHEST 1 VIEW: CPT

## 2019-01-09 PROCEDURE — 87070 CULTURE OTHR SPECIMN AEROBIC: CPT

## 2019-01-09 PROCEDURE — 87493 C DIFF AMPLIFIED PROBE: CPT

## 2019-01-09 PROCEDURE — 80074 ACUTE HEPATITIS PANEL: CPT

## 2019-01-09 PROCEDURE — G0008 ADMIN INFLUENZA VIRUS VAC: HCPCS

## 2019-01-09 PROCEDURE — 90686 IIV4 VACC NO PRSV 0.5 ML IM: CPT

## 2019-01-09 PROCEDURE — 81001 URINALYSIS AUTO W/SCOPE: CPT

## 2019-01-09 PROCEDURE — 83690 ASSAY OF LIPASE: CPT

## 2019-01-09 PROCEDURE — C1729 CATH, DRAINAGE: HCPCS

## 2019-01-09 PROCEDURE — C1769 GUIDE WIRE: HCPCS

## 2019-01-09 PROCEDURE — 71046 X-RAY EXAM CHEST 2 VIEWS: CPT

## 2019-01-09 PROCEDURE — 87077 CULTURE AEROBIC IDENTIFY: CPT

## 2019-01-09 PROCEDURE — 87075 CULTR BACTERIA EXCEPT BLOOD: CPT

## 2019-01-09 PROCEDURE — 85027 COMPLETE CBC AUTOMATED: CPT

## 2019-01-09 PROCEDURE — 80053 COMPREHEN METABOLIC PANEL: CPT

## 2019-01-09 PROCEDURE — 86140 C-REACTIVE PROTEIN: CPT

## 2019-01-09 PROCEDURE — 87186 SC STD MICRODIL/AGAR DIL: CPT

## 2019-01-09 PROCEDURE — 85025 COMPLETE CBC W/AUTO DIFF WBC: CPT

## 2019-01-09 PROCEDURE — 45330 DIAGNOSTIC SIGMOIDOSCOPY: CPT

## 2019-01-09 PROCEDURE — 99285 EMERGENCY DEPT VISIT HI MDM: CPT

## 2019-01-09 PROCEDURE — 87045 FECES CULTURE AEROBIC BACT: CPT

## 2019-01-09 PROCEDURE — 00532 ANES ACCESS CTR VENOUS CRCJ: CPT

## 2019-01-09 PROCEDURE — 80076 HEPATIC FUNCTION PANEL: CPT

## 2019-01-09 PROCEDURE — 77001 FLUOROGUIDE FOR VEIN DEVICE: CPT

## 2019-01-09 PROCEDURE — 82542 COL CHROMOTOGRAPHY QUAL/QUAN: CPT

## 2019-01-09 PROCEDURE — 80048 BASIC METABOLIC PNL TOTAL CA: CPT

## 2019-01-09 PROCEDURE — 87205 SMEAR GRAM STAIN: CPT

## 2019-01-09 PROCEDURE — 36415 COLL VENOUS BLD VENIPUNCTURE: CPT

## 2019-01-09 PROCEDURE — C1751 CATH, INF, PER/CENT/MIDLINE: HCPCS

## 2019-01-09 PROCEDURE — 74176 CT ABD & PELVIS W/O CONTRAST: CPT

## 2019-01-09 PROCEDURE — 76705 ECHO EXAM OF ABDOMEN: CPT

## 2019-01-09 RX ADMIN — METRONIDAZOLE SCH MLS/HR: 500 INJECTION, SOLUTION INTRAVENOUS at 20:31

## 2019-01-09 RX ADMIN — METHYLPREDNISOLONE SODIUM SUCCINATE SCH MG: 40 INJECTION, POWDER, FOR SOLUTION INTRAMUSCULAR; INTRAVENOUS at 18:13

## 2019-01-09 RX ADMIN — SODIUM CHLORIDE PRN MLS/HR: 0.45 INJECTION, SOLUTION INTRAVENOUS at 22:15

## 2019-01-09 NOTE — PDOC CONSULTATION
Consultation


Consult Date: 01/09/19





History of Present Illness


Admission Date/PCP: 


  01/09/19 17:30





  DEBBIE GUTIERREZ DO





History of Present Illness: 


JARVIS WELCH is a 58 year old female Patient who was admitted to the emergency 

room with worsening ulcerative colitis with an anasarca, Bloody diarrhea, and 

anorexia.  I first saw the patient in the office in November with new onset 

ulcerative colitis.  She had a normal colonoscopy in 2016 and did not start 

having diarrhea and rectal bleeding until after receiving Taltz for her 

psoriasis.  I believe her ulcerative colitis is antibiotics effect of an adverse

effect of the Taltz though she has not received any more doses since September 

or October.  She has severe left-sided colitis noted on colonoscopy performed on

12/20/2018 extending from the rectum up to the descending colon.  Review of the 

right colon was limited due to stool but no active colitis was identified.  She 

has been on 40 mg prednisone for the last month in addition to Lialda.  She was 

on Cipro and Flagyl for 2 weeks during the month of December.


She continues to have significant diarrhea about 4-5 times a day with blood in 

almost every bowel movement.  She also has recurrent abdominal pain mostly in 

the left upper quadrant on the right lower quadrant.  This is not particularly 

related to food or to bowel movement.  She denies any fever and only had nausea 

once a few days ago.  Her appetite has been poor though she has been drinking 2 

cans of boost every day.  She has chronic pedal edema but this has worsened in t

he last few weeks.  She has difficulty walking due to the swelling and has 

fallen a few times.  She denies dysuria but has not been passing as much urine 

as usual.


In the emergency room she had a CAT scan that showed a large 7 x 14 cm fluid 

collection in the lower abdomen.  There was only mild bowel wall thickening in 

the sigmoid colon.  She also had a lot of stool in the right colon.  White count

is 13 with a left shift and a platelet count of 616.  Albumin is 2.6.








Past Medical History


Cardiac Medical History: 


   Denies: Coronary Artery Disease, Myocardial Infarction, Hypertension


Pulmonary Medical History: 


   Denies: Asthma, Bronchitis, Chronic Obstructive Pulmonary Disease (COPD), 

Pneumonia


Neurological Medical History: 


   Denies: Seizures


GI Medical History: 


   Denies: Hepatitis, Hiatal Hernia


GI History Note: 





Ulcerative colitis secondary to Taltz


Musculoskeltal Medical History: Reports: Arthritis - psoriatic


Skin Medical History: Reports: Psoriasis


Psychiatric Medical History: Reports: Depression


Hematology: 


   Denies: Anemia, Sickle Cell Disease





Past Surgical History


Past Surgical History: Reports: Cholecystectomy, Hysterectomy, Orthopedic 

Surgery - right knee, left hand


   Denies: Amputation, Mastectomy





Social History


Lives with: Family


Smoking Status: Former Smoker


Frequency of Alcohol Use: None


Drugs: None





Family History


Family History: Reviewed & Not Pertinent


Parental Family History Reviewed: No


Children Family History Reviewed: NA


Sibling(s) Family History Reviewed.: NA





Medication/Allergy


Home Medications: 








Fluoxetine HCl [Prozac] 40 mg PO DAILY 01/09/19 


Fluticasone Propionate [Flonase Nasal Spray 50 Mcg/Spray 16 gm] 2 spray NASL 

DAILYP PRN 01/09/19 


Gabapentin [Neurontin 300 mg Capsule] 300 mg PO DAILY 01/09/19 


Gabapentin [Neurontin 300 mg Capsule] 600 mg PO QHS 01/09/19 


Hydrochlorothiazide [Hydrodiuril 25 mg Tablet] 25 mg PO DAILY 01/09/19 


Ixekizumab [Taltz Autoinjector] 80 mg IM [id number] 01/09/19 


Mercaptopurine [Purinethol 50 mg Tablet] 50 mg PO DAILY 01/09/19 


Mesalamine [Lialda] 4.8 gm PO DAILY 01/09/19 


Mesalamine [Rowasa 1000 mg Supp.rect] 1,000 mg MN DAILYP PRN 01/09/19 


Mirtazapine [Remeron 15 mg Tablet] 15 mg PO QHS 01/09/19 


Oxycodone HCl/Acetaminophen [Endocet 5-325 Tablet] 0.5 tab PO Q6HP PRN 01/09/19 


Tramadol HCl [Ultram 50 mg Tablet] 50 mg PO Q6HP PRN 01/09/19 








Allergies/Adverse Reactions: 


                                        





pineapple Allergy (Mild, Verified 01/09/19 12:11)


   


latex [Latex] Allergy (Verified 01/09/19 11:43)


   itching


nickel [Nickel] Allergy (Verified 01/09/19 11:43)


   ITCHY RASH


sulfamethoxazole [From Bactrim] Allergy (Verified 01/09/19 11:43)


   red rash


trimethoprim [From Bactrim] Allergy (Verified 01/09/19 11:43)


   red rash


MERCURY Allergy (Uncoded 01/09/19 11:43)


   ITCHY RASH











Review of Systems


All systems: reviewed and no additional remarkable complaints except as stated





Physical Exam


Vital Signs: 


                                        











Temp Pulse Resp BP Pulse Ox


 


 97.9 F   84   13   127/61 H  99 


 


 01/09/19 18:46  01/09/19 18:46  01/09/19 18:46  01/09/19 18:46  01/09/19 18:46








                                 Intake & Output











 01/08/19 01/09/19 01/10/19





 06:59 06:59 06:59


 


Intake Total   1200


 


Balance   1200


 


Weight   85.1 kg











Exam: 





General: Patient is alert.





HEENT: There is no pallor or jaundice.  PERRLA.  Oropharynx normal





Respiratory: No chest deformity. No respiratory distress.  Chest wall 

palpitation was unremarkable.  Breath sounds were normal





Cardiovascular: Heart sounds 1 and 2 normal with no murmurs.





Abdominal: Not distended.  Soft with some tenderness in the right upper quadrant

and the right lower quadrant.  There was no rebound or guarding.  Liver and 

spleen not palpable.  No ascites demonstrated.  Bowel sounds active.  Rectal 

examination was deferred.





Extremities: She has severe edema extending up the thighs and also involving 

both arms.





Neurological: Alert and oriented x4.  Grossly nonfocal.  Normal speech





Skin: No significant rash





Psychological: Normal affect





Results


Laboratory Results: 


                                        





                                 01/09/19 13:33 





                                 01/09/19 13:33 





                                        











  01/09/19 01/09/19 01/09/19





  12:39 13:33 13:33


 


WBC   13.7 H 


 


RBC   4.08 


 


Hgb   11.8 L 


 


Hct   35.5 L 


 


MCV   87 


 


MCH   28.9 


 


MCHC   33.2 


 


RDW   16.6 H 


 


Plt Count   616 H 


 


Seg Neutrophils %   88.3 H 


 


Lymphocytes %   8.5 L 


 


Monocytes %   3.1 


 


Eosinophils %   0.0 


 


Basophils %   0.1 


 


Absolute Neutrophils   12.1 H 


 


Absolute Lymphocytes   1.2 


 


Absolute Monocytes   0.4 


 


Absolute Eosinophils   0.0 


 


Absolute Basophils   0.0 


 


Sodium    132.1 L


 


Potassium    4.6


 


Chloride    92 L


 


Carbon Dioxide    32 H


 


Anion Gap    8


 


BUN    15


 


Creatinine    0.41 L


 


Est GFR ( Amer)    > 60


 


Est GFR (Non-Af Amer)    > 60


 


Glucose    108


 


Calcium    8.2 L


 


Total Bilirubin    0.6


 


AST    14


 


ALT    32


 


Alkaline Phosphatase    257 H


 


C-Reactive Protein   


 


Total Protein    4.8 L


 


Albumin    2.6 L


 


Lipase    < 10.0 L


 


Urine Color  CHLOÉ  


 


Urine Appearance  SLIGHTLY-CLOUDY  


 


Urine pH  5.0  


 


Ur Specific Gravity  1.038  


 


Urine Protein  100 H  


 


Urine Glucose (UA)  NEGATIVE  


 


Urine Ketones  NEGATIVE  


 


Urine Blood  NEGATIVE  


 


Urine Nitrite  NEGATIVE  


 


Ur Leukocyte Esterase  TRACE H  


 


Urine WBC (Auto)  3  


 


Urine RBC (Auto)  1  














  01/09/19





  13:33


 


WBC 


 


RBC 


 


Hgb 


 


Hct 


 


MCV 


 


MCH 


 


MCHC 


 


RDW 


 


Plt Count 


 


Seg Neutrophils % 


 


Lymphocytes % 


 


Monocytes % 


 


Eosinophils % 


 


Basophils % 


 


Absolute Neutrophils 


 


Absolute Lymphocytes 


 


Absolute Monocytes 


 


Absolute Eosinophils 


 


Absolute Basophils 


 


Sodium 


 


Potassium 


 


Chloride 


 


Carbon Dioxide 


 


Anion Gap 


 


BUN 


 


Creatinine 


 


Est GFR ( Amer) 


 


Est GFR (Non-Af Amer) 


 


Glucose 


 


Calcium 


 


Total Bilirubin 


 


AST 


 


ALT 


 


Alkaline Phosphatase 


 


C-Reactive Protein  174.4 H


 


Total Protein 


 


Albumin 


 


Lipase 


 


Urine Color 


 


Urine Appearance 


 


Urine pH 


 


Ur Specific Gravity 


 


Urine Protein 


 


Urine Glucose (UA) 


 


Urine Ketones 


 


Urine Blood 


 


Urine Nitrite 


 


Ur Leukocyte Esterase 


 


Urine WBC (Auto) 


 


Urine RBC (Auto) 











Impressions: 


                                        





Chest X-Ray  01/09/19 00:00


IMPRESSION:  NO ACUTE RADIOGRAPHIC FINDING IN THE CHEST.


 








Abdomen/Pelvis CT  01/09/19 15:01


IMPRESSION:  1.  Sigmoid diverticulosis.  Cannot exclude mild associated 

inflammation.


2.  There is a large irregularly-shaped fluid collection in the lower abdomen/ 

upper pelvis as described.  This is concerning for an abscess.  The exact origin

is not clear.  Is there clinical evidence of or history of appendicitis?


 














Assessment & Plan





- Diagnosis


(1) Ulcerative colitis


Qualifiers: 


   Ulcerative colitis location: unspecified ulcerative colitis location   

Digestive disease complication type: with rectal bleeding   Qualified Code(s): 

K51.919 - Ulcerative colitis, unspecified with unspecified complications   


Is this a current diagnosis for this admission?: Yes   


Plan: 


She has significant ulcerative colitis involving the distal rectum, sigmoid 

colon and the descending colon up to 65 cm on her last colonoscopy performed on 

12/20/2018.  Her symptoms has not responded much to prednisone and mesalamine.  

She could not obtain mesalamine enemas for insurance reasons.  She continues to 

have bloody diarrhea given the CAT scan only shows mild wall thickening in the 

left colon.  Her CRP was over 140.  I started her on IV Solu-Medrol 20 mg 3 

times daily in addition to Rowasa enemas.  She may need Biologics in the near f

uture.  I sent for TB QuantiFERON, hepatitis serology and HIV serology.  Stools 

were also sent for C. difficile and cultures.








(2) Abscess of abdominal cavity


Is this a current diagnosis for this admission?: Yes   


Plan: 


CT findings consistent with intra-abdominal abscess.  Consultation will be 

obtained with the surgeon and will also discuss with the radiologist to see if 

this can be approached percutaneously








(3) Anasarca


Is this a current diagnosis for this admission?: Yes   


Plan: 


This is probably from a combination of nutritional status and the use of 

prednisone.  She will continue with nutritional supplements in addition to a 

high calorie and high protein diet.  Albumin was 2.6.








(4) Elevated alkaline phosphatase level


Is this a current diagnosis for this admission?: Yes   





(5) Hypoalbuminemia


Is this a current diagnosis for this admission?: Yes

## 2019-01-09 NOTE — PDOC H&P
History of Present Illness


Admission Date/PCP: 


  01/09/19 17:30





  DEBBIE GUTIERREZ DO





History of Present Illness: 


JARVIS WELCH is a 58 year old female who was sent over from the gastroentero

logist office where she presented for follow-up on her ulcerative colitis.  She 

had been on a prednisone taper since November, but because her GI symptoms had 

not improved, her gastroenterologist decided to have her admitted so that she 

could be evaluated endoscopically.  She reports that she still has a lot of 

diarrhea, occasionally bloody.  She says that the prednisone has caused her to 

retain a lot of fluid in her legs well.  She has a little bit of abdominal 

tenderness.  She has not had any fevers.  No nausea or vomiting.  She says she 

does not eat very much because it runs right through her.








Past Medical History


Cardiac Medical History: 


   Denies: Coronary Artery Disease, Myocardial Infarction, Hypertension


Pulmonary Medical History: 


   Denies: Asthma, Bronchitis, Chronic Obstructive Pulmonary Disease (COPD), 

Pneumonia


Neurological Medical History: 


   Denies: Seizures


GI Medical History: 


   Denies: Hepatitis, Hiatal Hernia


Musculoskeltal Medical History: Reports: Arthritis - psoriatic


Skin Medical History: Reports: Psoriasis


Psychiatric Medical History: Reports: Depression


Hematology: 


   Denies: Anemia, Sickle Cell Disease





Past Surgical History


Past Surgical History: Reports: Cholecystectomy, Hysterectomy, Orthopedic 

Surgery - right knee, left hand


   Denies: Amputation, Mastectomy





Social History


Lives with: Family


Smoking Status: Former Smoker


Frequency of Alcohol Use: None


Drugs: None





Family History


Family History: Reviewed & Not Pertinent


Parental Family History Reviewed: No - Noncontributory


Children Family History Reviewed: No - Noncontributory


Sibling(s) Family History Reviewed.: No - Noncontributory





Medication/Allergy


Allergies/Adverse Reactions: 


                                        





pineapple Allergy (Mild, Verified 01/09/19 12:11)


   


latex [Latex] Allergy (Verified 01/09/19 11:43)


   itching


nickel [Nickel] Allergy (Verified 01/09/19 11:43)


   ITCHY RASH


sulfamethoxazole [From Bactrim] Allergy (Verified 01/09/19 11:43)


   red rash


trimethoprim [From Bactrim] Allergy (Verified 01/09/19 11:43)


   red rash


MERCURY Allergy (Uncoded 01/09/19 11:43)


   ITCHY RASH











Review of Systems


All systems: reviewed and no additional remarkable complaints except as stated -

10 point review of systems was conducted with the patient was negative except as

noted above patient did to do the treadmill and she feels like





Physical Exam


Vital Signs: 


                                        











Temp Pulse Resp BP Pulse Ox


 


 98.4 F   92   16   125/75   97 


 


 01/09/19 12:05  01/09/19 12:05  01/09/19 12:05  01/09/19 12:05  01/09/19 12:05








                                 Intake & Output











 01/08/19 01/09/19 01/10/19





 06:59 06:59 06:59


 


Weight   85.1 kg











General appearance: PRESENT: no acute distress, cooperative, disheveled, 

morbidly obese


Head exam: PRESENT: atraumatic, normocephalic


Eye exam: PRESENT: EOMI, PERRLA.  ABSENT: conjunctival injection, nystagmus, 

scleral icterus


Ear exam: PRESENT: normal external ear exam


Mouth exam: PRESENT: moist, neck supple


Teeth exam: PRESENT: poor dentation


Throat exam: ABSENT: post pharyngeal erythema


Neck exam: PRESENT: full ROM.  ABSENT: carotid bruit, JVD, lymphadenopathy, 

meningismus, tenderness, thyromegaly


Respiratory exam: PRESENT: clear to auscultation umu, symmetrical, unlabored.  

ABSENT: accessory muscle use, prolonged expiratory phas, rales, rhonchi, 

tachypnea, wheezes


Cardiovascular exam: PRESENT: RRR, +S1, +S2


Vascular exam: PRESENT: normal capillary refill


GI/Abdominal exam: PRESENT: normal bowel sounds, soft, tenderness - Some slight 

tenderness in the left upper quadrant and in the right lower quadrant.  ABSENT: 

distended, guarding, rebound


Extremities exam: PRESENT: pedal edema, other - 3+ pitting edema below the knees

bilaterally.  ABSENT: clubbing


Musculoskeletal exam: PRESENT: normal inspection.  ABSENT: deformity


Neurological exam: PRESENT: alert, awake, oriented to person, oriented to place,

oriented to time, oriented to situation, CN II-XII grossly intact.  ABSENT: 

motor sensory deficit


Psychiatric exam: PRESENT: appropriate affect, normal mood


Skin exam: PRESENT: dry, warm





Results


Laboratory Results: 


                                        





                                 01/09/19 13:33 





                                 01/09/19 13:33 





                                        











  01/09/19 01/09/19 01/09/19





  12:39 13:33 13:33


 


WBC   13.7 H 


 


RBC   4.08 


 


Hgb   11.8 L 


 


Hct   35.5 L 


 


MCV   87 


 


MCH   28.9 


 


MCHC   33.2 


 


RDW   16.6 H 


 


Plt Count   616 H 


 


Seg Neutrophils %   88.3 H 


 


Lymphocytes %   8.5 L 


 


Monocytes %   3.1 


 


Eosinophils %   0.0 


 


Basophils %   0.1 


 


Absolute Neutrophils   12.1 H 


 


Absolute Lymphocytes   1.2 


 


Absolute Monocytes   0.4 


 


Absolute Eosinophils   0.0 


 


Absolute Basophils   0.0 


 


Sodium    132.1 L


 


Potassium    4.6


 


Chloride    92 L


 


Carbon Dioxide    32 H


 


Anion Gap    8


 


BUN    15


 


Creatinine    0.41 L


 


Est GFR ( Amer)    > 60


 


Est GFR (Non-Af Amer)    > 60


 


Glucose    108


 


Calcium    8.2 L


 


Total Bilirubin    0.6


 


AST    14


 


ALT    32


 


Alkaline Phosphatase    257 H


 


C-Reactive Protein   


 


Total Protein    4.8 L


 


Albumin    2.6 L


 


Lipase    < 10.0 L


 


Urine Color  CHLOÉ  


 


Urine Appearance  SLIGHTLY-CLOUDY  


 


Urine pH  5.0  


 


Ur Specific Gravity  1.038  


 


Urine Protein  100 H  


 


Urine Glucose (UA)  NEGATIVE  


 


Urine Ketones  NEGATIVE  


 


Urine Blood  NEGATIVE  


 


Urine Nitrite  NEGATIVE  


 


Ur Leukocyte Esterase  TRACE H  


 


Urine WBC (Auto)  3  


 


Urine RBC (Auto)  1  














  01/09/19





  13:33


 


WBC 


 


RBC 


 


Hgb 


 


Hct 


 


MCV 


 


MCH 


 


MCHC 


 


RDW 


 


Plt Count 


 


Seg Neutrophils % 


 


Lymphocytes % 


 


Monocytes % 


 


Eosinophils % 


 


Basophils % 


 


Absolute Neutrophils 


 


Absolute Lymphocytes 


 


Absolute Monocytes 


 


Absolute Eosinophils 


 


Absolute Basophils 


 


Sodium 


 


Potassium 


 


Chloride 


 


Carbon Dioxide 


 


Anion Gap 


 


BUN 


 


Creatinine 


 


Est GFR ( Amer) 


 


Est GFR (Non-Af Amer) 


 


Glucose 


 


Calcium 


 


Total Bilirubin 


 


AST 


 


ALT 


 


Alkaline Phosphatase 


 


C-Reactive Protein  174.4 H


 


Total Protein 


 


Albumin 


 


Lipase 


 


Urine Color 


 


Urine Appearance 


 


Urine pH 


 


Ur Specific Gravity 


 


Urine Protein 


 


Urine Glucose (UA) 


 


Urine Ketones 


 


Urine Blood 


 


Urine Nitrite 


 


Ur Leukocyte Esterase 


 


Urine WBC (Auto) 


 


Urine RBC (Auto) 











Impressions: 


                                        





Chest X-Ray  01/09/19 00:00


IMPRESSION:  NO ACUTE RADIOGRAPHIC FINDING IN THE CHEST.


 








Abdomen/Pelvis CT  01/09/19 15:01


IMPRESSION:  1.  Sigmoid diverticulosis.  Cannot exclude mild associated 

inflammation.


2.  There is a large irregularly-shaped fluid collection in the lower abdomen/ 

upper pelvis as described.  This is concerning for an abscess.  The exact origin

is not clear.  Is there clinical evidence of or history of appendicitis?


 














Assessment & Plan





- Diagnosis


(1) Ulcerative colitis


Qualifiers: 


   Ulcerative colitis location: unspecified ulcerative colitis location   

Digestive disease complication type: unspecified complication   Qualified 

Code(s): K51.919 - Ulcerative colitis, unspecified with unspecified 

complications   


Is this a current diagnosis for this admission?: Yes   


Plan: 


She will be admitted and managed medically by gastroenterology.  Tentative plan 

is for some steroids and mesalamine, and possibly endoscopic evaluation.








- Time


Time Spent: 50 to 70 Minutes





- Inpatient Certification


Based on my medical assessment, after consideration of the patient's 

comorbidities, presenting symptoms, or acuity I expect that the services needed 

warrant INPATIENT care.: Yes


I certify that my determination is in accordance with my understanding of 

Medicare's requirements for reasonable and necessary INPATIENT services [42 CFR 

412.3e].: Yes


Medical Necessity: Significant Comorbidiites Make Outpatient Treatment Too 

Risky, Need for Surgery - Endoscopy

## 2019-01-09 NOTE — RADIOLOGY REPORT (SQ)
EXAM DESCRIPTION:  CT ABD/PELVIS WITH IV ONLY



COMPLETED DATE/TIME:  1/9/2019 5:36 pm



REASON FOR STUDY:  colitis rectal bleeding



COMPARISON:  11/15/2018



TECHNIQUE:  CT scan of the abdomen and pelvis performed using helical scanning technique with dynamic
 intravenous contrast injection.  No oral contrast. Images reviewed with lung, soft tissue, and bone 
windows. Reconstructed coronal and sagittal MPR images reviewed. Delayed images for evaluation of the
 urinary system also acquired. All images stored on PACS.

All CT scanners at this facility use dose modulation, iterative reconstruction, and/or weight based d
osing when appropriate to reduce radiation dose to as low as reasonably achievable (ALARA).

[facility]: Dose Right  [facility]: CareDose    [facility]: Dose Right    [facility]: Teradose 4D    [facility]: [employer] Technologies



CONTRAST TYPE AND DOSE:  contrast/concentration: Isovue 350.00 mg/ml; Total Contrast Delivered: 97.0 
ml; Total Saline Delivered: 67.8 ml

97 mL Omnipaque 350- low osmolar.



RENAL FUNCTION:  BUN 15 creatinine 0.41



RADIATION DOSE:  CT Rad equipment meets quality standard of care and radiation dose reduction techniq
ues were employed. CTDIvol: 11.7 - 15.9 mGy. DLP: 1579 mGy-cm..



LIMITATIONS:  None.



FINDINGS:  LOWER CHEST: Mild peripheral pulmonary fibrosis.

LIVER: Homogeneous.  No mass.

SPLEEN: Normal size. No focal lesions.

PANCREAS: No masses. No significant calcifications. No adjacent inflammation or peripancreatic fluid 
collections. Pancreatic duct not dilated.

GALLBLADDER: Surgically absent.

ADRENAL GLANDS: No significant masses or asymmetry.

RIGHT KIDNEY AND URETER: No solid masses.   No significant calcifications.   No hydronephrosis or hyd
roureter.

LEFT KIDNEY AND URETER: No solid masses.   No significant calcifications.   No hydronephrosis or hydr
oureter.

AORTA AND VESSELS: No aneurysm. No dissection. Renal arteries, SMA, celiac without stenosis.

RETROPERITONEUM: No retroperitoneal adenopathy, hemorrhage or masses.

BOWEL AND PERITONEAL CAVITY: There is considerable stool in the right colon.  There is a large irregu
lar fluid collection that appears to be associated with the cecum or perhaps the ileum.  This measure
s 7.1 cm in maximum cephalocaudal dimension on the coronal sequence.  This measures 14.3 cm in transv
erse diameter on the coronal sequence.  The maximum AP diameter is 7.5 cm.  There is an air-fluid lev
el.  This is distinct from the sigmoid colon and the urinary bladder. Sigmoid diverticulosis with no 
acute inflammatory changes.  There is mild thickening of a segment of the sigmoid colon, however.

APPENDIX: Not identified.

PELVIS: No mass.  No free fluid. Normal bladder.

ABDOMINAL WALL: No masses. No hernias.

BONES: No significant or acute findings.

OTHER: No other significant finding.



IMPRESSION:  1.  Sigmoid diverticulosis.  Cannot exclude mild associated inflammation.

2.  There is a large irregularly-shaped fluid collection in the lower abdomen/ upper pelvis as descri
bed.  This is concerning for an abscess.  The exact origin is not clear.  Is there clinical evidence 
of or history of appendicitis?



TECHNICAL DOCUMENTATION:  JOB ID:  [id number]

Quality ID # [id number]: Final reports with documentation of one or more dose reduction techniques (e.g., Au
tomated exposure control, adjustment of the mA and/or kV according to patient size, use of iterative 
reconstruction technique)

 2011 [employer]- All Rights Reserved



Reading location - IP/workstation name: [email or web address]

## 2019-01-10 LAB
ANION GAP SERPL CALC-SCNC: 8 MMOL/L (ref 5–19)
BUN SERPL-MCNC: 12 MG/DL (ref 7–20)
CALCIUM: 8 MG/DL (ref 8.4–10.2)
CHLORIDE SERPL-SCNC: 94 MMOL/L (ref 98–107)
CO2 SERPL-SCNC: 31 MMOL/L (ref 22–30)
ERYTHROCYTE [DISTWIDTH] IN BLOOD BY AUTOMATED COUNT: 16.4 % (ref 11.5–14)
GLUCOSE SERPL-MCNC: 92 MG/DL (ref 75–110)
HCT VFR BLD CALC: 29.9 % (ref 36–47)
HGB BLD-MCNC: 10.2 G/DL (ref 12–15.5)
MCH RBC QN AUTO: 29.7 PG (ref 27–33.4)
MCHC RBC AUTO-ENTMCNC: 34.2 G/DL (ref 32–36)
MCV RBC AUTO: 87 FL (ref 80–97)
PLATELET # BLD: 507 10^3/UL (ref 150–450)
POTASSIUM SERPL-SCNC: 4.2 MMOL/L (ref 3.6–5)
RBC # BLD AUTO: 3.44 10^6/UL (ref 3.72–5.28)
SODIUM SERPL-SCNC: 133 MMOL/L (ref 137–145)
WBC # BLD AUTO: 11.1 10^3/UL (ref 4–10.5)

## 2019-01-10 RX ADMIN — METRONIDAZOLE SCH MLS/HR: 500 INJECTION, SOLUTION INTRAVENOUS at 11:14

## 2019-01-10 RX ADMIN — IMIPENEM AND CILASTATIN SODIUM SCH MLS/HR: 500; 500 INJECTION, POWDER, FOR SOLUTION INTRAVENOUS at 06:06

## 2019-01-10 RX ADMIN — IMIPENEM AND CILASTATIN SODIUM SCH: 500; 500 INJECTION, POWDER, FOR SOLUTION INTRAVENOUS at 15:39

## 2019-01-10 RX ADMIN — METHYLPREDNISOLONE SODIUM SUCCINATE SCH MG: 40 INJECTION, POWDER, FOR SOLUTION INTRAMUSCULAR; INTRAVENOUS at 02:50

## 2019-01-10 RX ADMIN — METHYLPREDNISOLONE SODIUM SUCCINATE SCH MG: 40 INJECTION, POWDER, FOR SOLUTION INTRAMUSCULAR; INTRAVENOUS at 11:13

## 2019-01-10 RX ADMIN — METRONIDAZOLE SCH MLS/HR: 500 INJECTION, SOLUTION INTRAVENOUS at 02:50

## 2019-01-10 RX ADMIN — METRONIDAZOLE SCH MLS/HR: 500 INJECTION, SOLUTION INTRAVENOUS at 20:36

## 2019-01-10 RX ADMIN — IMIPENEM AND CILASTATIN SODIUM SCH MLS/HR: 500; 500 INJECTION, POWDER, FOR SOLUTION INTRAVENOUS at 17:34

## 2019-01-10 RX ADMIN — METHYLPREDNISOLONE SODIUM SUCCINATE SCH MG: 40 INJECTION, POWDER, FOR SOLUTION INTRAMUSCULAR; INTRAVENOUS at 17:34

## 2019-01-10 RX ADMIN — SODIUM CHLORIDE PRN MLS/HR: 0.45 INJECTION, SOLUTION INTRAVENOUS at 22:28

## 2019-01-10 NOTE — PDOC PROGRESS REPORT
Subjective


Progress Note for:: 01/10/19


Subjective:: 





No adverse overnight.  Pain is been well controlled.  She is been afebrile.  She

still got some tenderness in her right lower quadrant.  No nausea or vomiting.


Reason For Visit: 


ULCERATIVE COLITIS








Physical Exam


Vital Signs: 


                                        











Temp Pulse Resp BP Pulse Ox


 


 97.9 F   92   16   136/74 H  97 


 


 01/10/19 11:30  01/10/19 11:30  01/10/19 11:30  01/10/19 11:30  01/10/19 11:30








                                 Intake & Output











 01/09/19 01/10/19 01/11/19





 06:59 06:59 06:59


 


Intake Total  1500 


 


Balance  1500 


 


Weight  85.1 kg 








General appearance: PRESENT: no acute distress, cooperative, disheveled, 

morbidly obese


Respiratory exam: PRESENT: clear to auscultation umu, symmetrical, unlabored.  

ABSENT: accessory muscle use, prolonged expiratory phas, rales, rhonchi, 

tachypnea, wheezes


Cardiovascular exam: PRESENT: RRR, +S1, +S2


Vascular exam: PRESENT: normal capillary refill


GI/Abdominal exam: PRESENT: normal bowel sounds, soft, tenderness - Some slight 

tenderness in the left upper quadrant and in the right lower quadrant.  ABSENT: 

distended, guarding, rebound


Extremities exam: PRESENT: pedal edema, other - 3+ pitting edema below the knees

bilaterally.  ABSENT: clubbing


Musculoskeletal exam: PRESENT: normal inspection.  ABSENT: deformity


Neurological exam: PRESENT: alert, awake, oriented to person, oriented to place,

oriented to time, oriented to situation


Psychiatric exam: PRESENT: appropriate affect, normal mood


Skin exam: PRESENT: dry, warm








Results


Laboratory Results: 


                                        





                                 01/10/19 05:57 





                                 01/10/19 05:57 





                                        











  01/09/19 01/10/19 01/10/19





  13:33 05:57 05:57


 


WBC   11.1 H 


 


RBC   3.44 L 


 


Hgb   10.2 L 


 


Hct   29.9 L 


 


MCV   87 


 


MCH   29.7 


 


MCHC   34.2 


 


RDW   16.4 H 


 


Plt Count   507 H 


 


Sodium    133.0 L


 


Potassium    4.2


 


Chloride    94 L


 


Carbon Dioxide    31 H


 


Anion Gap    8


 


BUN    12


 


Creatinine    0.41 L


 


Est GFR ( Amer)    > 60


 


Est GFR (Non-Af Amer)    > 60


 


Glucose    92


 


Calcium    8.0 L


 


C-Reactive Protein  174.4 H  











Impressions: 


                                        





Chest X-Ray  01/09/19 00:00


IMPRESSION:  NO ACUTE RADIOGRAPHIC FINDING IN THE CHEST.


 








Abdomen/Pelvis CT  01/09/19 15:01


IMPRESSION:  1.  Sigmoid diverticulosis.  Cannot exclude mild associated 

inflammation.


2.  There is a large irregularly-shaped fluid collection in the lower abdomen/ 

upper pelvis as described.  This is concerning for an abscess.  The exact origin

is not clear.  Is there clinical evidence of or history of appendicitis?


 








Percutaneous Drainage  01/10/19 00:00


IMPRESSION:  Successful CT-guided drainage of pelvic abscess.


 














Assessment & Plan





- Diagnosis


(1) Ulcerative colitis


Qualifiers: 


   Ulcerative colitis location: unspecified ulcerative colitis location   

Digestive disease complication type: with rectal bleeding   Qualified Code(s): 

K51.911 - Ulcerative colitis, unspecified with rectal bleeding   


Is this a current diagnosis for this admission?: Yes   


Plan: 


She is on a medication regimen being managed by gastroenterology








(2) Abscess of abdominal cavity


Is this a current diagnosis for this admission?: Yes   


Plan: 


Currently on antibiotics per gastroenterology.  Plan is for percutaneous 

drainage of the abscess.  I have not had a chance to talk to surgery or see the 

note, but I suspect this was the choice for, among other reasons, the fact that 

she has got an acute colitis and is currently on steroids and therefore would be

less likely to heal and more likely to have complications from surgery.








- Time


Time Spent with patient: 15-24 minutes

## 2019-01-10 NOTE — PDOC PROGRESS REPORT
Subjective


Progress Note for:: 01/10/19


Subjective:: 





Several day history of pelvic pain.


Reason For Visit: 


ULCERATIVE COLITIS








Physical Exam


Vital Signs: 


                                        











Temp Pulse Resp BP Pulse Ox


 


 97.2 F   88   15   141/66 H  96 


 


 01/10/19 07:30  01/10/19 07:30  01/10/19 07:30  01/10/19 07:30  01/10/19 07:30








                                 Intake & Output











 01/09/19 01/10/19 01/11/19





 06:59 06:59 06:59


 


Intake Total  1400 


 


Balance  1400 


 


Weight  85.1 kg 











General appearance: PRESENT: no acute distress, cooperative


Respiratory exam: PRESENT: clear to auscultation umu


Cardiovascular exam: PRESENT: RRR


GI/Abdominal exam: PRESENT: other - Soft, mildly distended, tender in the lower 

abdomen without peritoneal signs.





Results


Laboratory Results: 


                                        





                                 01/10/19 05:57 





                                 01/10/19 05:57 





                                        











  01/09/19 01/09/19 01/09/19





  12:39 13:33 13:33


 


WBC   13.7 H 


 


RBC   4.08 


 


Hgb   11.8 L 


 


Hct   35.5 L 


 


MCV   87 


 


MCH   28.9 


 


MCHC   33.2 


 


RDW   16.6 H 


 


Plt Count   616 H 


 


Seg Neutrophils %   88.3 H 


 


Lymphocytes %   8.5 L 


 


Monocytes %   3.1 


 


Eosinophils %   0.0 


 


Basophils %   0.1 


 


Absolute Neutrophils   12.1 H 


 


Absolute Lymphocytes   1.2 


 


Absolute Monocytes   0.4 


 


Absolute Eosinophils   0.0 


 


Absolute Basophils   0.0 


 


Sodium    132.1 L


 


Potassium    4.6


 


Chloride    92 L


 


Carbon Dioxide    32 H


 


Anion Gap    8


 


BUN    15


 


Creatinine    0.41 L


 


Est GFR ( Amer)    > 60


 


Est GFR (Non-Af Amer)    > 60


 


Glucose    108


 


Calcium    8.2 L


 


Total Bilirubin    0.6


 


AST    14


 


ALT    32


 


Alkaline Phosphatase    257 H


 


C-Reactive Protein   


 


Total Protein    4.8 L


 


Albumin    2.6 L


 


Lipase    < 10.0 L


 


Urine Color  CHLOÉ  


 


Urine Appearance  SLIGHTLY-CLOUDY  


 


Urine pH  5.0  


 


Ur Specific Gravity  1.038  


 


Urine Protein  100 H  


 


Urine Glucose (UA)  NEGATIVE  


 


Urine Ketones  NEGATIVE  


 


Urine Blood  NEGATIVE  


 


Urine Nitrite  NEGATIVE  


 


Ur Leukocyte Esterase  TRACE H  


 


Urine WBC (Auto)  3  


 


Urine RBC (Auto)  1  














  01/09/19 01/10/19 01/10/19





  13:33 05:57 05:57


 


WBC   11.1 H 


 


RBC   3.44 L 


 


Hgb   10.2 L 


 


Hct   29.9 L 


 


MCV   87 


 


MCH   29.7 


 


MCHC   34.2 


 


RDW   16.4 H 


 


Plt Count   507 H 


 


Seg Neutrophils %   


 


Lymphocytes %   


 


Monocytes %   


 


Eosinophils %   


 


Basophils %   


 


Absolute Neutrophils   


 


Absolute Lymphocytes   


 


Absolute Monocytes   


 


Absolute Eosinophils   


 


Absolute Basophils   


 


Sodium    133.0 L


 


Potassium    4.2


 


Chloride    94 L


 


Carbon Dioxide    31 H


 


Anion Gap    8


 


BUN    12


 


Creatinine    0.41 L


 


Est GFR ( Amer)    > 60


 


Est GFR (Non-Af Amer)    > 60


 


Glucose    92


 


Calcium    8.0 L


 


Total Bilirubin   


 


AST   


 


ALT   


 


Alkaline Phosphatase   


 


C-Reactive Protein  174.4 H  


 


Total Protein   


 


Albumin   


 


Lipase   


 


Urine Color   


 


Urine Appearance   


 


Urine pH   


 


Ur Specific Gravity   


 


Urine Protein   


 


Urine Glucose (UA)   


 


Urine Ketones   


 


Urine Blood   


 


Urine Nitrite   


 


Ur Leukocyte Esterase   


 


Urine WBC (Auto)   


 


Urine RBC (Auto)   











Impressions: 


                                        





Chest X-Ray  01/09/19 00:00


IMPRESSION:  NO ACUTE RADIOGRAPHIC FINDING IN THE CHEST.


 








Abdomen/Pelvis CT  01/09/19 15:01


IMPRESSION:  1.  Sigmoid diverticulosis.  Cannot exclude mild associated 

inflammation.


2.  There is a large irregularly-shaped fluid collection in the lower abdomen/ 

upper pelvis as described.  This is concerning for an abscess.  The exact origin

is not clear.  Is there clinical evidence of or history of appendicitis?


 














Assessment & Plan





- Diagnosis


(1) Abdominal abscess


Is this a current diagnosis for this admission?: Yes   


Plan: 


Pelvic abscess of unclear etiology.  Plan CT-guided drainage today.

## 2019-01-10 NOTE — RADIOLOGY REPORT (SQ)
EXAM DESCRIPTION:  CT GUIDED PERCUT DRAIN W/CATH



COMPLETED DATE/TIME:  1/10/2019 2:48 pm



REASON FOR STUDY:  colon abscess



COMPARISON:  None.



FLUORO TIME:  11 seconds

119 images saved to PACS.



LIMITATIONS:  None.



PROCEDURE:  After obtaining informed consent, the patient was brought to the CT suite and was placed 
supine on the CT gurney. The patient was prepped and draped in the usual sterile fashion .  Axial thomas
ges were obtained for targeting of theanterior pelvic gas fluid collection. An appropriate access sit
e was selected.  IV conscious sedation was administered and physician direction by the registered lea
se using 1 milligrams of Versed and 100 micrograms of fentanyl. Physiologic monitoring was provided b
efore, during, and after sedation. The total sedation time was 30 minutes.

Documentation face to face time, the performing proceduralist, spent monitoring the patient: 10minute
s.

Purulent fluid was aspirated.  The needle was exchanged for a guidewire.  Following serial dilation, 
a 10 [place] APD drain was placed and secured.  Drain was connected to collection device.  Approximate
ly 100 cc of fluid was aspirated.  A sterile dressing was applied.



IMPRESSION:  Successful CT-guided drainage of pelvic abscess.



COMMENT:  Patient medication list reviewed:Yes- Quality ID# [id number]:Eligible professional attests to docu
menting in the medical record they obtained, updated, or reviewed the patient's current medications.

Quality ID #76: The patient was prepped and draped using maximum sterile barrier technique including 
cap, mask, sterile gown, sterile gloves, a large sterile sheet, hand hygiene, and 2% Chlorhexidine fo
r cutaneous antisepsis. When ultrasound is used, sterile ultrasound techniques are followed requiring
 sterile gel and sterile probes.

Quality :  Final reports for procedures using fluoroscopy that document radiation exposure alma
grant, or exposure time and number of fluorographic images (if radiation exposure indices are not avail
able)

Quality ID# 436: Final reports with documentation of one or more dose reduction techniques (e.g., Aut
omated exposure control, adjustment of the mA and/or kV according to patient size, use of iterative r
econstruction technique)



TECHNICAL DOCUMENTATION:  JOB ID:  [id number]

 2011 [employer]- All Rights Reserved                           rev-4/18



Reading location - IP/workstation name: [email or web address] [id number]

## 2019-01-10 NOTE — PDOC CONSULTATION
Consultation


Consult Date: 01/09/19


Consult reason:: Intra-abdominal abscess





History of Present Illness


Admission Date/PCP: 


  01/09/19 17:30





  DEBBIE GUTIERREZ DO





History of Present Illness: 


JARVIS WELCH is a 58 year old female seen at the request of Dr. Michelle.  This is a

patient with a long-standing history of ulcerative colitis.  She has been 

treated with Biologics and steroids in the past.  She reports long-standing ab

dominal pain.  It is dull and aching.  It is constant.  She rates it is 5 out of

10.  The pain does not radiate.  Nothing makes her symptoms better or worse.  

The patient has frequent rectal bleeding, however her rectal bleeding has 

subsided recently.  Patient had a colonoscopy by Dr. Michelle several months ago 

demonstrating active ulcerative colitis.  Currently, the patient denies chest 

pain, shortness of breath, fevers, chills, headache, blurry vision, orthostasis,

sore throat.  Patient does report fatigue, malaise, abdominal pain, and dysuria.








Past Medical History


Cardiac Medical History: 


   Denies: Coronary Artery Disease, Myocardial Infarction, Hypertension


Pulmonary Medical History: 


   Denies: Asthma, Bronchitis, Chronic Obstructive Pulmonary Disease (COPD), 

Pneumonia


Neurological Medical History: 


   Denies: Seizures


GI Medical History: 


   Denies: Hepatitis, Hiatal Hernia


Musculoskeltal Medical History: Reports: Arthritis - psoriatic


Skin Medical History: Reports: Psoriasis


Psychiatric Medical History: Reports: Depression


Hematology: 


   Denies: Anemia, Sickle Cell Disease





Past Surgical History


Past Surgical History: Reports: Cholecystectomy, Hysterectomy, Orthopedic 

Surgery - right knee, left hand


   Denies: Amputation, Mastectomy





Social History


Lives with: Family


Smoking Status: Former Smoker


Frequency of Alcohol Use: None


Drugs: None





Family History


Family History: Reviewed & Not Pertinent


Parental Family History Reviewed: Yes


Children Family History Reviewed: Yes


Sibling(s) Family History Reviewed.: Yes





Medication/Allergy


Home Medications: 








Fluoxetine HCl [Prozac] 40 mg PO DAILY 01/09/19 


Fluticasone Propionate [Flonase Nasal Spray 50 Mcg/Spray 16 gm] 2 spray NASL 

DAILYP PRN 01/09/19 


Gabapentin [Neurontin 300 mg Capsule] 300 mg PO DAILY 01/09/19 


Gabapentin [Neurontin 300 mg Capsule] 600 mg PO QHS 01/09/19 


Hydrochlorothiazide [Hydrodiuril 25 mg Tablet] 25 mg PO DAILY 01/09/19 


Ixekizumab [Taltz Autoinjector] 80 mg IM [id number] 01/09/19 


Mercaptopurine [Purinethol 50 mg Tablet] 50 mg PO DAILY 01/09/19 


Mesalamine [Lialda] 4.8 gm PO DAILY 01/09/19 


Mesalamine [Rowasa 1000 mg Supp.rect] 1,000 mg [place] DAILYP PRN 01/09/19 


Mirtazapine [Remeron 15 mg Tablet] 15 mg PO QHS 01/09/19 


Oxycodone HCl/Acetaminophen [Endocet 5-325 Tablet] 0.5 tab PO Q6HP PRN 01/09/19 


Tramadol HCl [Ultram 50 mg Tablet] 50 mg PO Q6HP PRN 01/09/19 








Allergies/Adverse Reactions: 


                                        





pineapple Allergy (Mild, Verified 01/09/19 12:11)


   


latex [Latex] Allergy (Verified 01/09/19 11:43)


   itching


nickel [Nickel] Allergy (Verified 01/09/19 11:43)


   ITCHY RASH


sulfamethoxazole [From Bactrim] Allergy (Verified 01/09/19 11:43)


   red rash


trimethoprim [From Bactrim] Allergy (Verified 01/09/19 11:43)


   red rash


MERCURY Allergy (Uncoded 01/09/19 11:43)


   ITCHY RASH











Review of Systems


Constitutional: PRESENT: fatigue.  ABSENT: anorexia, chills, fever(s)


Eyes: ABSENT: visual disturbances


Ears: ABSENT: hearing changes


Nose, Mouth, and Throat: ABSENT: sore throat


Cardiovascular: ABSENT: chest pain


Respiratory: ABSENT: cough


Gastrointestinal: PRESENT: abdominal pain, bloating.  ABSENT: hematochezia - 

None recently, nausea, vomiting


Genitourinary: PRESENT: dysuria


Musculoskeletal: ABSENT: back pain


Integumentary: ABSENT: pruritus, rash


Neurological: ABSENT: confusion, convulsions, dizziness


Psychiatric: ABSENT: anxiety, depression


Endocrine: ABSENT: cold intolerance, heat intolerance


Hematologic/Lymphatic: ABSENT: easy bleeding





Physical Exam


Vital Signs: 


                                        











Temp Pulse Resp BP Pulse Ox


 


 97.9 F   84   13   127/61 H  99 


 


 01/09/19 18:46  01/09/19 18:46  01/09/19 18:46  01/09/19 18:46  01/09/19 18:46








                                 Intake & Output











 01/08/19 01/09/19 01/10/19





 06:59 06:59 06:59


 


Intake Total   1200


 


Balance   1200


 


Weight   85.1 kg











General appearance: PRESENT: no acute distress, cooperative


Head exam: PRESENT: atraumatic, normocephalic


Eye exam: PRESENT: EOMI, PERRLA.  ABSENT: scleral icterus


Mouth exam: PRESENT: moist, neck supple


Teeth exam: ABSENT: poor dentation


Neck exam: ABSENT: meningismus, tenderness, thyromegaly, tracheal deviation


Respiratory exam: PRESENT: clear to auscultation umu, unlabored.  ABSENT: chest 

wall tenderness, tachypnea, wheezes


Cardiovascular exam: PRESENT: RRR


Pulses: PRESENT: normal radial pulses


Vascular exam: PRESENT: normal capillary refill.  ABSENT: pallor


GI/Abdominal exam: PRESENT: soft, tenderness - Right lower quadrant.  ABSENT: 

distended, firm, guarding, rigid


Rectal exam: PRESENT: deferred


Extremities exam: ABSENT: clubbing


Musculoskeletal exam: ABSENT: deformity


Neurological exam: PRESENT: alert, awake, oriented to person, oriented to place,

oriented to time, oriented to situation, CN II-XII grossly intact.  ABSENT: 

motor sensory deficit


Psychiatric exam: ABSENT: agitated, anxious, depressed


Focused psych exam: ABSENT: delusional


Skin exam: ABSENT: cyanosis, erythema, jaundice





Results


Laboratory Results: 


                                        





                                 01/09/19 13:33 





                                 01/09/19 13:33 





                                        











  01/09/19 01/09/19 01/09/19





  12:39 13:33 13:33


 


WBC   13.7 H 


 


RBC   4.08 


 


Hgb   11.8 L 


 


Hct   35.5 L 


 


MCV   87 


 


MCH   28.9 


 


MCHC   33.2 


 


RDW   16.6 H 


 


Plt Count   616 H 


 


Seg Neutrophils %   88.3 H 


 


Lymphocytes %   8.5 L 


 


Monocytes %   3.1 


 


Eosinophils %   0.0 


 


Basophils %   0.1 


 


Absolute Neutrophils   12.1 H 


 


Absolute Lymphocytes   1.2 


 


Absolute Monocytes   0.4 


 


Absolute Eosinophils   0.0 


 


Absolute Basophils   0.0 


 


Sodium    132.1 L


 


Potassium    4.6


 


Chloride    92 L


 


Carbon Dioxide    32 H


 


Anion Gap    8


 


BUN    15


 


Creatinine    0.41 L


 


Est GFR ( Amer)    > 60


 


Est GFR (Non-Af Amer)    > 60


 


Glucose    108


 


Calcium    8.2 L


 


Total Bilirubin    0.6


 


AST    14


 


ALT    32


 


Alkaline Phosphatase    257 H


 


C-Reactive Protein   


 


Total Protein    4.8 L


 


Albumin    2.6 L


 


Lipase    < 10.0 L


 


Urine Color  CHLOÉ  


 


Urine Appearance  SLIGHTLY-CLOUDY  


 


Urine pH  5.0  


 


Ur Specific Gravity  1.038  


 


Urine Protein  100 H  


 


Urine Glucose (UA)  NEGATIVE  


 


Urine Ketones  NEGATIVE  


 


Urine Blood  NEGATIVE  


 


Urine Nitrite  NEGATIVE  


 


Ur Leukocyte Esterase  TRACE H  


 


Urine WBC (Auto)  3  


 


Urine RBC (Auto)  1  














  01/09/19





  13:33


 


WBC 


 


RBC 


 


Hgb 


 


Hct 


 


MCV 


 


MCH 


 


MCHC 


 


RDW 


 


Plt Count 


 


Seg Neutrophils % 


 


Lymphocytes % 


 


Monocytes % 


 


Eosinophils % 


 


Basophils % 


 


Absolute Neutrophils 


 


Absolute Lymphocytes 


 


Absolute Monocytes 


 


Absolute Eosinophils 


 


Absolute Basophils 


 


Sodium 


 


Potassium 


 


Chloride 


 


Carbon Dioxide 


 


Anion Gap 


 


BUN 


 


Creatinine 


 


Est GFR ( Amer) 


 


Est GFR (Non-Af Amer) 


 


Glucose 


 


Calcium 


 


Total Bilirubin 


 


AST 


 


ALT 


 


Alkaline Phosphatase 


 


C-Reactive Protein  174.4 H


 


Total Protein 


 


Albumin 


 


Lipase 


 


Urine Color 


 


Urine Appearance 


 


Urine pH 


 


Ur Specific Gravity 


 


Urine Protein 


 


Urine Glucose (UA) 


 


Urine Ketones 


 


Urine Blood 


 


Urine Nitrite 


 


Ur Leukocyte Esterase 


 


Urine WBC (Auto) 


 


Urine RBC (Auto) 











Impressions: 


                                        





Chest X-Ray  01/09/19 00:00


IMPRESSION:  NO ACUTE RADIOGRAPHIC FINDING IN THE CHEST.


 








Abdomen/Pelvis CT  01/09/19 15:01


IMPRESSION:  1.  Sigmoid diverticulosis.  Cannot exclude mild associated 

inflammation.


2.  There is a large irregularly-shaped fluid collection in the lower abdomen/ 

upper pelvis as described.  This is concerning for an abscess.  The exact origin

is not clear.  Is there clinical evidence of or history of appendicitis?


 














Assessment & Plan





- Diagnosis


(1) Abscess of abdominal cavity


Is this a current diagnosis for this admission?: Yes   





- Plan Summary


Plan Summary: 





This is a 58-year-old female with a history of ulcerative colitis, recently 

treated with Biologics and steroids.  She has apparently developed an intra-

abdominal abscess.  She reports right lower quadrant abdominal pain and dysuria.

 At this time, she does not appear toxic.  Her white blood cell count is moderat

ely elevated.  She does not exhibit peritonitis at present.  The patient will 

require drainage of her abscess, however the optimal method has yet to be 

determined.  I will evaluate her CT scan with a radiologist tomorrow in an 

attempt to determine if percutaneous drainage of the abscess is feasible.  If 

percutaneous drainage is not possible, she may require operative drainage of her

abscess.  I will continue to follow this patient very closely with you.  The 

plan has been discussed at length with the patient, and she is in agreement.

## 2019-01-11 LAB
ANION GAP SERPL CALC-SCNC: 7 MMOL/L (ref 5–19)
BUN SERPL-MCNC: 14 MG/DL (ref 7–20)
CALCIUM: 7.9 MG/DL (ref 8.4–10.2)
CHLORIDE SERPL-SCNC: 96 MMOL/L (ref 98–107)
CO2 SERPL-SCNC: 32 MMOL/L (ref 22–30)
ERYTHROCYTE [DISTWIDTH] IN BLOOD BY AUTOMATED COUNT: 16.3 % (ref 11.5–14)
GLUCOSE SERPL-MCNC: 81 MG/DL (ref 75–110)
HCT VFR BLD CALC: 38.4 % (ref 36–47)
HEPATITIS C VIRUS ANTIBODY: <0.1 S/CO RATIO (ref 0–0.9)
HGB BLD-MCNC: 13 G/DL (ref 12–15.5)
MCH RBC QN AUTO: 29.5 PG (ref 27–33.4)
MCHC RBC AUTO-ENTMCNC: 33.8 G/DL (ref 32–36)
MCV RBC AUTO: 87 FL (ref 80–97)
PLATELET # BLD: 405 10^3/UL (ref 150–450)
POTASSIUM SERPL-SCNC: 3.1 MMOL/L (ref 3.6–5)
RBC # BLD AUTO: 4.4 10^6/UL (ref 3.72–5.28)
SODIUM SERPL-SCNC: 134.6 MMOL/L (ref 137–145)
WBC # BLD AUTO: 6.9 10^3/UL (ref 4–10.5)

## 2019-01-11 PROCEDURE — BW211ZZ COMPUTERIZED TOMOGRAPHY (CT SCAN) OF ABDOMEN AND PELVIS USING LOW OSMOLAR CONTRAST: ICD-10-PCS | Performed by: RADIOLOGY

## 2019-01-11 PROCEDURE — 0DJD8ZZ INSPECTION OF LOWER INTESTINAL TRACT, VIA NATURAL OR ARTIFICIAL OPENING ENDOSCOPIC: ICD-10-PCS | Performed by: INTERNAL MEDICINE

## 2019-01-11 PROCEDURE — 0W9J30Z DRAINAGE OF PELVIC CAVITY WITH DRAINAGE DEVICE, PERCUTANEOUS APPROACH: ICD-10-PCS | Performed by: INTERNAL MEDICINE

## 2019-01-11 RX ADMIN — METHYLPREDNISOLONE SODIUM SUCCINATE SCH MG: 40 INJECTION, POWDER, FOR SOLUTION INTRAMUSCULAR; INTRAVENOUS at 02:54

## 2019-01-11 RX ADMIN — METHYLPREDNISOLONE SODIUM SUCCINATE SCH MG: 40 INJECTION, POWDER, FOR SOLUTION INTRAMUSCULAR; INTRAVENOUS at 19:12

## 2019-01-11 RX ADMIN — METRONIDAZOLE SCH MLS/HR: 500 INJECTION, SOLUTION INTRAVENOUS at 19:12

## 2019-01-11 RX ADMIN — METRONIDAZOLE SCH MLS/HR: 500 INJECTION, SOLUTION INTRAVENOUS at 02:54

## 2019-01-11 RX ADMIN — HEPARIN SODIUM SCH UNIT: 5000 INJECTION, SOLUTION INTRAVENOUS; SUBCUTANEOUS at 22:01

## 2019-01-11 RX ADMIN — FLUOXETINE SCH: 20 CAPSULE ORAL at 16:10

## 2019-01-11 RX ADMIN — METRONIDAZOLE SCH MLS/HR: 500 INJECTION, SOLUTION INTRAVENOUS at 09:59

## 2019-01-11 RX ADMIN — METHYLPREDNISOLONE SODIUM SUCCINATE SCH MG: 40 INJECTION, POWDER, FOR SOLUTION INTRAMUSCULAR; INTRAVENOUS at 09:59

## 2019-01-11 RX ADMIN — IMIPENEM AND CILASTATIN SODIUM SCH MLS/HR: 500; 500 INJECTION, POWDER, FOR SOLUTION INTRAVENOUS at 00:42

## 2019-01-11 RX ADMIN — IMIPENEM AND CILASTATIN SODIUM SCH MLS/HR: 500; 500 INJECTION, POWDER, FOR SOLUTION INTRAVENOUS at 21:48

## 2019-01-11 RX ADMIN — IMIPENEM AND CILASTATIN SODIUM SCH MLS/HR: 500; 500 INJECTION, POWDER, FOR SOLUTION INTRAVENOUS at 13:22

## 2019-01-11 RX ADMIN — IMIPENEM AND CILASTATIN SODIUM SCH MLS/HR: 500; 500 INJECTION, POWDER, FOR SOLUTION INTRAVENOUS at 06:23

## 2019-01-11 NOTE — OPERATIVE REPORT
Operative Report


DATE OF SURGERY: 01/11/19


Operative Report: 





Pre-op diagnosis: Diarrhea and rectal bleeding





Post-op diagnosis:


1.  Marked ulceration of the distal rectum and the sigmoid colon





Surgery: Sigmoidoscopy





Medications: None





Tissue removed: None





Procedure: After informed consent obtained from patient she was placed in the 

left lateral position.  The upper endoscope was then inserted into the rectum 

and advanced to the sigmoid colon to about 30 cm.  I did not go any farther due 

to significant ulceration and edema and patient was having abdominal pain.  

There was multiple large ulceration starting from the rectosigmoid junction and 

also in the distal 4-5 cm of the rectum.  The proximal rectum was spared.





Findings








Sigmoid colon: Marked ulceration with edema.


Rectum: Normal proximal rectum.  Marked discrete ulcerations in the distal 5 cm 

of the rectum.  The distal rectal involvement was slightly better than her 

previous endoscopy a couple of weeks ago.





Plan: This is most likely Crohn's disease caused by the use of Taltz.  Continue 

IV steroids, antibiotics and mesalamine


OPERATION: .

## 2019-01-11 NOTE — PDOC PROGRESS REPORT
Subjective


Progress Note for:: 01/11/19


Subjective:: 





She was admitted 2 days ago with poorly controlled colitis and intra-abdominal 

abscess.  She had CT-guided drainage yesterday with 100 cc of fluid removed.  

Medical chart indicates another 200 cc was emptied.  She does feel slightly 

better.  She continues to have significant edema and has not been ambulatory.  

Her platelet count has reduced and her white count is now normal.  She continues

on antibiotics and IV Solu-Medrol.


Reason For Visit: 


ULCERATIVE COLITIS








Physical Exam


Vital Signs: 


                                        











Temp Pulse Resp BP Pulse Ox


 


 98.1 F   85   15   149/83 H  100 


 


 01/11/19 15:04  01/11/19 16:30  01/11/19 16:30  01/11/19 16:30  01/11/19 16:30








                                 Intake & Output











 01/10/19 01/11/19 01/12/19





 06:59 06:59 06:59


 


Intake Total 1500 2140 200


 


Output Total  200 30


 


Balance 1500 1940 170


 


Weight 85.1 kg 84.2 kg 











Exam: 





General: Patient is alert 





HEENT: There is no pallor or jaundice.  PERRLA.  Oropharynx normal





Respiratory: No chest deformity. No respiratory distress.  Chest wall 

palpitation was unremarkable.  Breath sounds were normal





Cardiovascular: Heart sounds 1 and 2 normal with no murmurs.





Abdominal: Not distended.  Soft and nontender.  She has a drainage tube in the 

suprapubic area





Extremities: Bilateral edema extending to the thighs





Neurological: Alert and oriented x4.  Grossly nonfocal.  Normal speech





Skin: No significant rash





Psychological: Normal affect





Results


Laboratory Results: 


                                        





                                 01/11/19 05:53 





                                 01/11/19 05:53 





                                        











  01/11/19 01/11/19





  05:53 05:53


 


WBC  6.9 


 


RBC  4.40 


 


Hgb  13.0  D 


 


Hct  38.4 


 


MCV  87 


 


MCH  29.5 


 


MCHC  33.8 


 


RDW  16.3 H 


 


Plt Count  405 


 


Sodium   134.6 L


 


Potassium   3.1 L D


 


Chloride   96 L


 


Carbon Dioxide   32 H


 


Anion Gap   7


 


BUN   14


 


Creatinine   0.40 L


 


Est GFR ( Amer)   > 60


 


Est GFR (Non-Af Amer)   > 60


 


Glucose   81


 


Calcium   7.9 L








                                        





01/09/19 18:29   Stool - Stool    - Final


01/09/19 18:29   Stool - Stool   Stool Culture - Final


                            NO SALMONELLA, SHIGELLA, CAMPYLOBACTER, OR E.COLI 

0157


                            RECOVERED.


                            NEGATIVE FOR SHIGA TOXINS 1&2.








Impressions: 


                                        





Chest X-Ray  01/09/19 00:00


IMPRESSION:  NO ACUTE RADIOGRAPHIC FINDING IN THE CHEST.


 








Abdomen/Pelvis CT  01/09/19 15:01


IMPRESSION:  1.  Sigmoid diverticulosis.  Cannot exclude mild associated 

inflammation.


2.  There is a large irregularly-shaped fluid collection in the lower abdomen/ 

upper pelvis as described.  This is concerning for an abscess.  The exact origin

is not clear.  Is there clinical evidence of or history of appendicitis?


 








Percutaneous Drainage  01/10/19 00:00


IMPRESSION:  Successful CT-guided drainage of pelvic abscess.


 














Assessment & Plan





- Diagnosis


(1) Ulcerative colitis


Qualifiers: 


   Ulcerative colitis location: unspecified ulcerative colitis location   

Digestive disease complication type: with rectal bleeding   Qualified Code(s): 

K51.911 - Ulcerative colitis, unspecified with rectal bleeding   


Is this a current diagnosis for this admission?: Yes   


Plan: 


She is doing slightly better with improvement in her white count and 

inflammatory markers.  She will undergo a flexible sigmoidoscopy today to look 

at her left-sided colitis.  She will continue with IV Solu-Medrol and mesalamine

p.o.  She is refusing rectal mesalamine.  She may still be a candidate for a 

biologic though this would be the last resort especially with her abdomen 

abscess.








(2) Abscess of abdominal cavity


Is this a current diagnosis for this admission?: Yes   


Plan: 


This was partially drained yesterday and a tube remains in place.  There is no 

much drainage in the collection bag at this time.  She will undergo an 

ultrasound to evaluate the size of the abscess and this should be repeated 

regularly.  She remains on antibiotics.  Her peripheral IV infiltrated and she 

has been stuck many times.  I would request for a PICC line








(3) Anasarca


Is this a current diagnosis for this admission?: Yes   





(4) Elevated alkaline phosphatase level


Is this a current diagnosis for this admission?: Yes   





(5) Hypoalbuminemia


Is this a current diagnosis for this admission?: Yes   


Plan: 


She is on a high-calorie, high-protein diet with Ensure supplements.

## 2019-01-11 NOTE — RADIOLOGY REPORT (SQ)
EXAM DESCRIPTION: 



US ABDOMEN LIMITED



COMPLETED DATE/TME:  01/11/2019 00:00



CLINICAL HISTORY: 



58 years, Female, Assess intrabdominal abscess



COMPARISON:

CT 1/9/2019



TECHNIQUE:

Limited ultrasound in the region of the patient's prior abscess

was performed, inferior to the umbilicus.



LIMITATIONS:

None.



FINDINGS:



No discrete or defined fluid collection in the region scanned to

suggest residual abscess.



IMPRESSION:



No definitive abscess in the region scanned

 



copyright 2011 [employer]- All Rights Reserved

## 2019-01-11 NOTE — PDOC PROGRESS REPORT
Subjective


Progress Note for:: 01/11/19


Subjective:: 


No adverse events overnight.  No new complaints.  No fevers.  She has had a 

little bit of drainage from her DAY drain.  Her chief concern today is going 

ahead and get her colonoscopy done so she can eat.


Reason For Visit: 


ULCERATIVE COLITIS








Physical Exam


Vital Signs: 


                                        











Temp Pulse Resp BP Pulse Ox


 


 98.1 F   91   12   160/90 H  100 


 


 01/11/19 15:04  01/11/19 16:05  01/11/19 16:05  01/11/19 16:05  01/11/19 16:05








                                 Intake & Output











 01/10/19 01/11/19 01/12/19





 06:59 06:59 06:59


 


Intake Total 1500 2140 200


 


Output Total  200 30


 


Balance 1500 1940 170


 


Weight 85.1 kg 84.2 kg 








General appearance: PRESENT: no acute distress, cooperative, disheveled, 

morbidly obese


Respiratory exam: PRESENT: clear to auscultation umu, symmetrical, unlabored.  

ABSENT: accessory muscle use, prolonged expiratory phas, rales, rhonchi, 

tachypnea, wheezes


Cardiovascular exam: PRESENT: RRR, +S1, +S2


Vascular exam: PRESENT: normal capillary refill


GI/Abdominal exam: PRESENT: normal bowel sounds, soft, DAY drain has some 

brownish fluid in the bag, entry site is unremarkable.  ABSENT: distended, 

guarding, rebound


Extremities exam: PRESENT: pedal edema, other - 3+ pitting edema below the knees

bilaterally.  ABSENT: clubbing


Musculoskeletal exam: PRESENT: normal inspection.  ABSENT: deformity


Neurological exam: PRESENT: alert, awake, oriented to person, oriented to place,

oriented to time, oriented to situation


Psychiatric exam: PRESENT: appropriate affect, normal mood


Skin exam: PRESENT: dry, warm





Results


Laboratory Results: 


                                        





                                 01/11/19 05:53 





                                 01/11/19 05:53 





                                        











  01/11/19 01/11/19





  05:53 05:53


 


WBC  6.9 


 


RBC  4.40 


 


Hgb  13.0  D 


 


Hct  38.4 


 


MCV  87 


 


MCH  29.5 


 


MCHC  33.8 


 


RDW  16.3 H 


 


Plt Count  405 


 


Sodium   134.6 L


 


Potassium   3.1 L D


 


Chloride   96 L


 


Carbon Dioxide   32 H


 


Anion Gap   7


 


BUN   14


 


Creatinine   0.40 L


 


Est GFR ( Amer)   > 60


 


Est GFR (Non-Af Amer)   > 60


 


Glucose   81


 


Calcium   7.9 L








                                        





01/09/19 18:29   Stool - Stool    - Final


01/09/19 18:29   Stool - Stool   Stool Culture - Final


                            NO SALMONELLA, SHIGELLA, CAMPYLOBACTER, OR E.COLI 

0157


                            RECOVERED.


                            NEGATIVE FOR SHIGA TOXINS 1&2.








Impressions: 


                                        





Chest X-Ray  01/09/19 00:00


IMPRESSION:  NO ACUTE RADIOGRAPHIC FINDING IN THE CHEST.


 








Abdomen/Pelvis CT  01/09/19 15:01


IMPRESSION:  1.  Sigmoid diverticulosis.  Cannot exclude mild associated 

inflammation.


2.  There is a large irregularly-shaped fluid collection in the lower abdomen/ 

upper pelvis as described.  This is concerning for an abscess.  The exact origin

is not clear.  Is there clinical evidence of or history of appendicitis?


 








Percutaneous Drainage  01/10/19 00:00


IMPRESSION:  Successful CT-guided drainage of pelvic abscess.


 














Assessment & Plan





- Diagnosis


(1) Abscess of abdominal cavity


Is this a current diagnosis for this admission?: Yes   


Plan: 


Currently on Cipro and Flagyl.  DAY drain has been placed.  Awaiting culture 

results.








(2) Ulcerative colitis


Qualifiers: 


   Ulcerative colitis location: unspecified ulcerative colitis location   

Digestive disease complication type: with rectal bleeding   Qualified Code(s): 

K51.911 - Ulcerative colitis, unspecified with rectal bleeding   


Is this a current diagnosis for this admission?: Yes   


Plan: 


She is being managed medically by gastroenterology.  Colonoscopy is pending.








- Time


Time Spent with patient: 15-24 minutes

## 2019-01-11 NOTE — PDOC PROGRESS REPORT
Subjective


Progress Note for:: 01/11/19


Reason For Visit: 


ULCERATIVE COLITIS


s/p perc drainage of pelvic abscess








Physical Exam


Vital Signs: 


                                        











Temp Pulse Resp BP Pulse Ox


 


 98.1 F   82   14   146/82 H  100 


 


 01/11/19 15:04  01/11/19 16:35  01/11/19 16:35  01/11/19 16:35  01/11/19 16:35








                                 Intake & Output











 01/10/19 01/11/19 01/12/19





 06:59 06:59 06:59


 


Intake Total 1500 2140 300


 


Output Total  200 30


 


Balance 1500 1940 270


 


Weight 85.1 kg 84.2 kg 











General appearance: PRESENT: no acute distress


Head exam: PRESENT: normocephalic


Respiratory exam: PRESENT: clear to auscultation umu


Cardiovascular exam: PRESENT: RRR


Vascular exam: PRESENT: normal capillary refill


GI/Abdominal exam: PRESENT: soft - drain with purulent fluid





Results


Laboratory Results: 


                                        





                                 01/11/19 05:53 





                                 01/11/19 05:53 





                                        











  01/11/19 01/11/19





  05:53 05:53


 


WBC  6.9 


 


RBC  4.40 


 


Hgb  13.0  D 


 


Hct  38.4 


 


MCV  87 


 


MCH  29.5 


 


MCHC  33.8 


 


RDW  16.3 H 


 


Plt Count  405 


 


Sodium   134.6 L


 


Potassium   3.1 L D


 


Chloride   96 L


 


Carbon Dioxide   32 H


 


Anion Gap   7


 


BUN   14


 


Creatinine   0.40 L


 


Est GFR ( Amer)   > 60


 


Est GFR (Non-Af Amer)   > 60


 


Glucose   81


 


Calcium   7.9 L








                                        





01/09/19 18:29   Stool - Stool    - Final


01/09/19 18:29   Stool - Stool   Stool Culture - Final


                            NO SALMONELLA, SHIGELLA, CAMPYLOBACTER, OR E.COLI 

0157


                            RECOVERED.


                            NEGATIVE FOR SHIGA TOXINS 1&2.








Impressions: 


                                        





Chest X-Ray  01/09/19 00:00


IMPRESSION:  NO ACUTE RADIOGRAPHIC FINDING IN THE CHEST.


 








Abdomen/Pelvis CT  01/09/19 15:01


IMPRESSION:  1.  Sigmoid diverticulosis.  Cannot exclude mild associated infla

mmation.


2.  There is a large irregularly-shaped fluid collection in the lower abdomen/ 

upper pelvis as described.  This is concerning for an abscess.  The exact origin

is not clear.  Is there clinical evidence of or history of appendicitis?


 








Percutaneous Drainage  01/10/19 00:00


IMPRESSION:  Successful CT-guided drainage of pelvic abscess.


 














Assessment & Plan





- Plan Summary


Plan Summary: 





pt doing better since perc drain


gi requested long term venous access for 


home iv abx


will plan on groshong cath vs penn cath


placemnt in am


discussed risks and benifits iwth pt 


incluiding bleeding , pneumothorax


infection arrhythmia

## 2019-01-12 LAB
ANION GAP SERPL CALC-SCNC: 8 MMOL/L (ref 5–19)
BUN SERPL-MCNC: 13 MG/DL (ref 7–20)
CALCIUM: 8 MG/DL (ref 8.4–10.2)
CHLORIDE SERPL-SCNC: 97 MMOL/L (ref 98–107)
CO2 SERPL-SCNC: 28 MMOL/L (ref 22–30)
ERYTHROCYTE [DISTWIDTH] IN BLOOD BY AUTOMATED COUNT: 16.2 % (ref 11.5–14)
GLUCOSE SERPL-MCNC: 92 MG/DL (ref 75–110)
HCT VFR BLD CALC: 31.3 % (ref 36–47)
HGB BLD-MCNC: 10.8 G/DL (ref 12–15.5)
MCH RBC QN AUTO: 30 PG (ref 27–33.4)
MCHC RBC AUTO-ENTMCNC: 34.5 G/DL (ref 32–36)
MCV RBC AUTO: 87 FL (ref 80–97)
PLATELET # BLD: 560 10^3/UL (ref 150–450)
POTASSIUM SERPL-SCNC: 4.2 MMOL/L (ref 3.6–5)
RBC # BLD AUTO: 3.6 10^6/UL (ref 3.72–5.28)
SODIUM SERPL-SCNC: 133.2 MMOL/L (ref 137–145)
WBC # BLD AUTO: 8.7 10^3/UL (ref 4–10.5)

## 2019-01-12 RX ADMIN — IMIPENEM AND CILASTATIN SODIUM SCH MLS/HR: 500; 500 INJECTION, POWDER, FOR SOLUTION INTRAVENOUS at 21:35

## 2019-01-12 RX ADMIN — METHYLPREDNISOLONE SODIUM SUCCINATE SCH MG: 40 INJECTION, POWDER, FOR SOLUTION INTRAMUSCULAR; INTRAVENOUS at 01:57

## 2019-01-12 RX ADMIN — METRONIDAZOLE SCH MLS/HR: 500 INJECTION, SOLUTION INTRAVENOUS at 11:00

## 2019-01-12 RX ADMIN — HEPARIN SODIUM SCH UNIT: 5000 INJECTION, SOLUTION INTRAVENOUS; SUBCUTANEOUS at 09:35

## 2019-01-12 RX ADMIN — HEPARIN SODIUM SCH UNIT: 5000 INJECTION, SOLUTION INTRAVENOUS; SUBCUTANEOUS at 21:36

## 2019-01-12 RX ADMIN — IMIPENEM AND CILASTATIN SODIUM SCH MLS/HR: 500; 500 INJECTION, POWDER, FOR SOLUTION INTRAVENOUS at 09:35

## 2019-01-12 RX ADMIN — METHYLPREDNISOLONE SODIUM SUCCINATE SCH MG: 40 INJECTION, POWDER, FOR SOLUTION INTRAMUSCULAR; INTRAVENOUS at 09:35

## 2019-01-12 RX ADMIN — IMIPENEM AND CILASTATIN SODIUM SCH MLS/HR: 500; 500 INJECTION, POWDER, FOR SOLUTION INTRAVENOUS at 15:50

## 2019-01-12 RX ADMIN — IMIPENEM AND CILASTATIN SODIUM SCH MLS/HR: 500; 500 INJECTION, POWDER, FOR SOLUTION INTRAVENOUS at 03:03

## 2019-01-12 RX ADMIN — METRONIDAZOLE SCH MLS/HR: 500 INJECTION, SOLUTION INTRAVENOUS at 01:54

## 2019-01-12 RX ADMIN — METRONIDAZOLE SCH MLS/HR: 500 INJECTION, SOLUTION INTRAVENOUS at 17:14

## 2019-01-12 RX ADMIN — FLUOXETINE SCH MG: 20 CAPSULE ORAL at 09:36

## 2019-01-12 RX ADMIN — SODIUM CHLORIDE PRN MLS/HR: 0.45 INJECTION, SOLUTION INTRAVENOUS at 01:53

## 2019-01-12 RX ADMIN — METHYLPREDNISOLONE SODIUM SUCCINATE SCH MG: 40 INJECTION, POWDER, FOR SOLUTION INTRAMUSCULAR; INTRAVENOUS at 17:14

## 2019-01-12 NOTE — PDOC PROGRESS REPORT
Subjective


Progress Note for:: 01/12/19


Subjective:: 





No adverse events overnight.  No new complaints.  Vital signs been stable.  She 

is been afebrile.  She is waiting to get her PICC line so she can get her IV 

antibiotics.  Abdominal pain is minimal.


Reason For Visit: 


ULCERATIVE COLITIS








Physical Exam


Vital Signs: 


                                        











Temp Pulse Resp BP Pulse Ox


 


 98.7 F   82   18   143/74 H  97 


 


 01/12/19 07:28  01/12/19 07:28  01/12/19 07:28  01/12/19 07:28  01/12/19 07:28








                                 Intake & Output











 01/11/19 01/12/19 01/13/19





 06:59 06:59 06:59


 


Intake Total 2140 1840 100


 


Output Total 200 30 


 


Balance 1940 1810 100


 


Weight 84.2 kg 85.2 kg 








General appearance: PRESENT: no acute distress, cooperative, disheveled, 

morbidly obese


Respiratory exam: PRESENT: clear to auscultation umu, symmetrical, unlabored.  

ABSENT: accessory muscle use, prolonged expiratory phas, rales, rhonchi, 

tachypnea, wheezes


Cardiovascular exam: PRESENT: RRR, +S1, +S2


Vascular exam: PRESENT: normal capillary refill


GI/Abdominal exam: PRESENT: normal bowel sounds, soft, DAY drain has some 

brownish fluid in the bag, entry site is unremarkable.  ABSENT: distended, 

guarding, rebound


Extremities exam: PRESENT: pedal edema, other - 3+ pitting edema below the knees

bilaterally.  ABSENT: clubbing


Musculoskeletal exam: PRESENT: normal inspection.  ABSENT: deformity


Neurological exam: PRESENT: alert, awake, oriented to person, oriented to place,

oriented to time, oriented to situation


Psychiatric exam: PRESENT: appropriate affect, normal mood


Skin exam: PRESENT: dry, warm








Results


Laboratory Results: 


                                        





                                 01/12/19 06:48 





                                 01/12/19 06:48 





                                        











  01/12/19 01/12/19 01/12/19





  06:48 06:48 06:48


 


WBC  8.7  


 


RBC  3.60 L  


 


Hgb  10.8 L D  


 


Hct  31.3 L  


 


MCV  87  


 


MCH  30.0  


 


MCHC  34.5  


 


RDW  16.2 H  


 


Plt Count  560 H  


 


Sodium   133.2 L 


 


Potassium   4.2 


 


Chloride   97 L 


 


Carbon Dioxide   28 


 


Anion Gap   8 


 


BUN   13 


 


Creatinine   0.33 L 


 


Est GFR ( Amer)   > 60 


 


Est GFR (Non-Af Amer)   > 60 


 


Glucose   92 


 


Calcium   8.0 L 


 


C-Reactive Protein    59.2 H








                                        





01/09/19 18:29   Stool - Stool    - Final


01/09/19 18:29   Stool - Stool   Stool Culture - Final


                            NO SALMONELLA, SHIGELLA, CAMPYLOBACTER, OR E.COLI 

0157


                            RECOVERED.


                            NEGATIVE FOR SHIGA TOXINS 1&2.








Impressions: 


                                        





Chest X-Ray  01/09/19 00:00


IMPRESSION:  NO ACUTE RADIOGRAPHIC FINDING IN THE CHEST.


 








Abdomen/Pelvis CT  01/09/19 15:01


IMPRESSION:  1.  Sigmoid diverticulosis.  Cannot exclude mild associated inflam

mation.


2.  There is a large irregularly-shaped fluid collection in the lower abdomen/ 

upper pelvis as described.  This is concerning for an abscess.  The exact origin

is not clear.  Is there clinical evidence of or history of appendicitis?


 








Percutaneous Drainage  01/10/19 00:00


IMPRESSION:  Successful CT-guided drainage of pelvic abscess.


 








Abdomen Ultrasound  01/11/19 00:00


IMPRESSION:


 


No definitive abscess in the region scanned


 


 


copyright 2011 [employer]- All Rights Reserved


 














Assessment & Plan





- Diagnosis


(1) Abscess of abdominal cavity


Is this a current diagnosis for this admission?: Yes   


Plan: 


GI wants to treat her for complete course of therapy with IV antibiotics.  She 

is scheduled get a PICC line today.  Cultures show a preliminary result but the 

definitive identification and susceptibility is still pending.  Percutaneous 

drain management per surgery.








(2) Ulcerative colitis


Qualifiers: 


   Ulcerative colitis location: unspecified ulcerative colitis location   

Digestive disease complication type: with rectal bleeding   Qualified Code(s): 

K51.911 - Ulcerative colitis, unspecified with rectal bleeding   


Is this a current diagnosis for this admission?: Yes   


Plan: 


She is being managed medically by gastroenterology. 








- Time


Time Spent with patient: 15-24 minutes

## 2019-01-12 NOTE — PDOC PROGRESS REPORT
Subjective


Progress Note for:: 01/12/19


Subjective:: 





58-year-old female status post insertion of a percutaneous drain for an intra-

abdominal abscess.  The patient is in need of long-term intravenous access for 

antibiotic administration.  Currently, the patient reports that her abdominal 

pain is improving.  Her appetite is good.  She is afebrile.  She denies chest 

pain, shortness of breath, blurry vision, orthostasis, headache, nausea, 

vomiting, melena, hematochezia, hematemesis, fatigue, malaise.


Reason For Visit: 


ULCERATIVE COLITIS








Physical Exam


Vital Signs: 


                                        











Temp Pulse Resp BP Pulse Ox


 


 97.8 F   86   16   128/60 H  98 


 


 01/12/19 12:21  01/12/19 12:21  01/12/19 12:21  01/12/19 12:21  01/12/19 12:21








                                 Intake & Output











 01/11/19 01/12/19 01/13/19





 06:59 06:59 06:59


 


Intake Total 2140 1840 200


 


Output Total 200 30 


 


Balance 1940 1810 200


 


Weight 84.2 kg 85.2 kg 











General appearance: PRESENT: no acute distress, cooperative


Head exam: PRESENT: atraumatic, normocephalic


Eye exam: PRESENT: EOMI, PERRLA.  ABSENT: scleral icterus


Mouth exam: PRESENT: moist, neck supple


Teeth exam: ABSENT: poor dentation


Neck exam: ABSENT: meningismus, tenderness, thyromegaly, tracheal deviation


Respiratory exam: PRESENT: clear to auscultation umu, unlabored.  ABSENT: chest 

wall tenderness, tachypnea, wheezes


Cardiovascular exam: PRESENT: RRR


Pulses: PRESENT: normal radial pulses


Vascular exam: PRESENT: normal capillary refill.  ABSENT: pallor


GI/Abdominal exam: PRESENT: soft, other - Drain in place..  ABSENT: distended, 

tenderness


Rectal exam: PRESENT: deferred


Extremities exam: PRESENT: pedal edema


Musculoskeletal exam: ABSENT: deformity


Neurological exam: PRESENT: alert, awake, oriented to person, oriented to place


Psychiatric exam: ABSENT: agitated, anxious, depressed


Focused psych exam: ABSENT: delusional


Skin exam: ABSENT: cyanosis, erythema, jaundice





Results


Laboratory Results: 


                                        





                                 01/12/19 06:48 





                                 01/12/19 06:48 





                                        











  01/12/19 01/12/19 01/12/19





  06:48 06:48 06:48


 


WBC  8.7  


 


RBC  3.60 L  


 


Hgb  10.8 L D  


 


Hct  31.3 L  


 


MCV  87  


 


MCH  30.0  


 


MCHC  34.5  


 


RDW  16.2 H  


 


Plt Count  560 H  


 


Sodium   133.2 L 


 


Potassium   4.2 


 


Chloride   97 L 


 


Carbon Dioxide   28 


 


Anion Gap   8 


 


BUN   13 


 


Creatinine   0.33 L 


 


Est GFR ( Amer)   > 60 


 


Est GFR (Non-Af Amer)   > 60 


 


Glucose   92 


 


Calcium   8.0 L 


 


C-Reactive Protein    59.2 H








                                        





01/09/19 18:29   Stool - Stool    - Final


01/09/19 18:29   Stool - Stool   Stool Culture - Final


                            NO SALMONELLA, SHIGELLA, CAMPYLOBACTER, OR E.COLI 

0157


                            RECOVERED.


                            NEGATIVE FOR SHIGA TOXINS 1&2.








Impressions: 


                                        





Chest X-Ray  01/09/19 00:00


IMPRESSION:  NO ACUTE RADIOGRAPHIC FINDING IN THE CHEST.


 








Abdomen/Pelvis CT  01/09/19 15:01


IMPRESSION:  1.  Sigmoid diverticulosis.  Cannot exclude mild associated 

inflammation.


2.  There is a large irregularly-shaped fluid collection in the lower abdomen/ 

upper pelvis as described.  This is concerning for an abscess.  The exact origin

is not clear.  Is there clinical evidence of or history of appendicitis?


 








Percutaneous Drainage  01/10/19 00:00


IMPRESSION:  Successful CT-guided drainage of pelvic abscess.


 








Abdomen Ultrasound  01/11/19 00:00


IMPRESSION:


 


No definitive abscess in the region scanned


 


 


copyright 2011 [employer]- All Rights Reserved


 














Assessment & Plan





- Diagnosis


(1) Abscess of abdominal cavity


Is this a current diagnosis for this admission?: Yes   





(2) Phlebosclerosis


Is this a current diagnosis for this admission?: Yes   





- Plan Summary


Plan Summary: 





This is a 58-year-old female in need of stable intravenous access for home 

antibiotic administration.  Plan for Schaefer catheter tomorrow.  N.p.o. after 

midnight.  Risks/benefits discussed, informed consent obtained, and all 

questions answered.

## 2019-01-13 PROCEDURE — B548ZZA ULTRASONOGRAPHY OF SUPERIOR VENA CAVA, GUIDANCE: ICD-10-PCS | Performed by: SURGERY

## 2019-01-13 PROCEDURE — 02HV33Z INSERTION OF INFUSION DEVICE INTO SUPERIOR VENA CAVA, PERCUTANEOUS APPROACH: ICD-10-PCS | Performed by: SURGERY

## 2019-01-13 RX ADMIN — IMIPENEM AND CILASTATIN SODIUM SCH MLS/HR: 500; 500 INJECTION, POWDER, FOR SOLUTION INTRAVENOUS at 03:23

## 2019-01-13 RX ADMIN — METRONIDAZOLE SCH MLS/HR: 500 INJECTION, SOLUTION INTRAVENOUS at 17:04

## 2019-01-13 RX ADMIN — METHYLPREDNISOLONE SODIUM SUCCINATE SCH MG: 40 INJECTION, POWDER, FOR SOLUTION INTRAMUSCULAR; INTRAVENOUS at 09:43

## 2019-01-13 RX ADMIN — IMIPENEM AND CILASTATIN SODIUM SCH MLS/HR: 500; 500 INJECTION, POWDER, FOR SOLUTION INTRAVENOUS at 21:19

## 2019-01-13 RX ADMIN — IMIPENEM AND CILASTATIN SODIUM SCH: 500; 500 INJECTION, POWDER, FOR SOLUTION INTRAVENOUS at 07:55

## 2019-01-13 RX ADMIN — METRONIDAZOLE SCH MLS/HR: 500 INJECTION, SOLUTION INTRAVENOUS at 01:34

## 2019-01-13 RX ADMIN — HEPARIN SODIUM SCH UNIT: 5000 INJECTION, SOLUTION INTRAVENOUS; SUBCUTANEOUS at 21:19

## 2019-01-13 RX ADMIN — SODIUM CHLORIDE PRN MLS/HR: 0.45 INJECTION, SOLUTION INTRAVENOUS at 01:42

## 2019-01-13 RX ADMIN — FLUOXETINE SCH MG: 20 CAPSULE ORAL at 09:45

## 2019-01-13 RX ADMIN — IMIPENEM AND CILASTATIN SODIUM SCH MLS/HR: 500; 500 INJECTION, POWDER, FOR SOLUTION INTRAVENOUS at 09:43

## 2019-01-13 RX ADMIN — METHYLPREDNISOLONE SODIUM SUCCINATE SCH MG: 40 INJECTION, POWDER, FOR SOLUTION INTRAMUSCULAR; INTRAVENOUS at 01:35

## 2019-01-13 RX ADMIN — IMIPENEM AND CILASTATIN SODIUM SCH MLS/HR: 500; 500 INJECTION, POWDER, FOR SOLUTION INTRAVENOUS at 14:19

## 2019-01-13 RX ADMIN — METRONIDAZOLE SCH MLS/HR: 500 INJECTION, SOLUTION INTRAVENOUS at 09:43

## 2019-01-13 RX ADMIN — METHYLPREDNISOLONE SODIUM SUCCINATE SCH MG: 40 INJECTION, POWDER, FOR SOLUTION INTRAMUSCULAR; INTRAVENOUS at 17:04

## 2019-01-13 RX ADMIN — HEPARIN SODIUM SCH UNIT: 5000 INJECTION, SOLUTION INTRAVENOUS; SUBCUTANEOUS at 09:44

## 2019-01-13 NOTE — OPERATIVE REPORT
Nonrecallable Operative Report


DATE OF SURGERY: 01/13/19


PREOPERATIVE DIAGNOSIS: 1.  Phlebosclerosis.  #2 intra-abdominal abscess


POSTOPERATIVE DIAGNOSIS: Same as above


OPERATION: 1.  Ultrasound-guided central venous puncture.  2.  Left internal 

jugular vein Schaefer catheter placement (centrally inserted, tunneled catheter 

without subcutaneous port)


SURGEON: MICHAEL GALLARDO


ANESTHESIA: LMAC


TISSUE REMOVED OR ALTERED: None


COMPLICATIONS: 





None apparent


ESTIMATED BLOOD LOSS: Minimal


PROCEDURE: 





Drains/implants: Schaefer catheter placement.





Procedure in detail: After informed consent was obtained, the patient was 

brought to the operating room and laid in the Trendelenburg position.  The area 

of the neck and chest were prepped and draped in a normal sterile fashion.  An 

ultrasound was used to identify the left internal jugular vein.  It was 

compressible with normal flow.  The supplied access needle was used to cannulate

the left internal jugular vein under direct ultrasound guidance.  Dark venous, 

nonpulsatile blood was returned in the syringe.  The wire was inserted into the 

vein.  The wire was confirmed to be within the lumen of the vein using both 

fluoroscopy as well as ultrasound.  Photodocumentation was taken.  The catheter 

was then tunneled from a separate stab incision in the left chest up to the 

needle insertion site.  The catheter was then trimmed to length.  The dilator 

and breakaway sheath were then inserted over the wire.  The wire and dilator 

were removed, leaving the sheath within the superior vena cava.  The catheter 

was then advanced through the sheath.  The sheath was cracked and pulled away, 

leaving the catheter within the SVC.  The catheter was confirmed to be in good 

place using fluoroscopy.  The catheter was aspirated and flushed easily.  The 

catheter was then sutured to the chest wall.  The needle insertion site was 

closed using 4-0 Vicryl Rapide suture in subcuticular fashion.  A dressing was 

placed, and the procedure was concluded.  All sponge, instrument, and needle 

counts were correct x2.





Condition: Stable.

## 2019-01-13 NOTE — PROGRESS NOTE
Provider Note


Provider Note: 





Pt doing well, s/p Percutaneous drainage of intra-abdominal abscess. Schaefer 

catheter in-place. Leave Perc-drain to suction for now. F/u with me in the 

office in 7-10 days. Will see again as-needed as inpatient. Please renotify with

any questions or concerns.

## 2019-01-13 NOTE — RADIOLOGY REPORT (SQ)
EXAM DESCRIPTION:  FLUORO/CV PLACEMENT; CHEST SINGLE VIEW



COMPLETED DATE/TIME:  1/13/2019 9:05 am



REASON FOR STUDY:  LT SIDE GOEL



COMPARISON:  1/9/2019



NUMBER OF VIEWS:  One view.

2 images during fluoroscopy.  0.4 minutes fluoro time.



TECHNIQUE:  Single frontal radiographic view of the chest acquired.



LIMITATIONS:  None.



FINDINGS:  LUNGS AND PLEURA: No opacities, masses or pneumothorax. No pleural effusion.

MEDIASTINUM AND HILAR STRUCTURES: No masses.  Contour normal.

HEART AND VASCULAR STRUCTURES: Heart normal in size.  Normal vasculature.

BONES: No acute findings.

HARDWARE: Venous access catheter tip at the junction of the left subclavian vein and SVC.

OTHER: No other significant finding.



IMPRESSION:  No pneumothorax postprocedure.  Venous access catheter tip at the junction of the left s
ubclavian vein and SVC.  No pneumothorax postprocedure.



TECHNICAL DOCUMENTATION:  JOB ID:  [id number]

 2011 [employer]- All Rights Reserved



FLUOROSCOPY TIME:  0.4

2 images saved to PACS.



Reading location - IP/workstation name: [facility]

## 2019-01-13 NOTE — RADIOLOGY REPORT (SQ)
EXAM DESCRIPTION:  FLUORO/CV PLACEMENT; CHEST SINGLE VIEW



COMPLETED DATE/TIME:  1/13/2019 9:05 am



REASON FOR STUDY:  LT SIDE GOEL



COMPARISON:  1/9/2019



NUMBER OF VIEWS:  One view.

2 images during fluoroscopy.  0.4 minutes fluoro time.



TECHNIQUE:  Single frontal radiographic view of the chest acquired.



LIMITATIONS:  None.



FINDINGS:  LUNGS AND PLEURA: No opacities, masses or pneumothorax. No pleural effusion.

MEDIASTINUM AND HILAR STRUCTURES: No masses.  Contour normal.

HEART AND VASCULAR STRUCTURES: Heart normal in size.  Normal vasculature.

BONES: No acute findings.

HARDWARE: Venous access catheter tip at the junction of the left subclavian vein and SVC.

OTHER: No other significant finding.



IMPRESSION:  No pneumothorax postprocedure.  Venous access catheter tip at the junction of the left s
ubclavian vein and SVC.  No pneumothorax postprocedure.



TECHNICAL DOCUMENTATION:  JOB ID:  [id number]

 2011 [employer]- All Rights Reserved



FLUOROSCOPY TIME:  0.4

2 images saved to PACS.



Reading location - IP/workstation name: [email or web address]

## 2019-01-13 NOTE — PDOC PROGRESS REPORT
Subjective


Progress Note for:: 01/13/19


Subjective:: 





No adverse events overnight.  No new complaints.  She been eating and drinking 

without difficulty.  Vital signs been stable.  Percutaneous abdominal drain is 

having minimal output.


Reason For Visit: 


ULCERATIVE COLITIS








Physical Exam


Vital Signs: 


                                        











Temp Pulse Resp BP Pulse Ox


 


 98.2 F   81   16   131/76 H  97 


 


 01/13/19 12:13  01/13/19 12:13  01/13/19 12:13  01/13/19 12:13  01/13/19 12:13








                                 Intake & Output











 01/12/19 01/13/19 01/14/19





 06:59 06:59 06:59


 


Intake Total 1840 2000 1650


 


Output Total 30 10 40


 


Balance 1810 1990 1610


 


Weight 85.2 kg 86.2 kg 








General appearance: PRESENT: no acute distress, cooperative, disheveled, mor

bidly obese


Respiratory exam: PRESENT: clear to auscultation umu, symmetrical, unlabored.  

ABSENT: accessory muscle use, prolonged expiratory phas, rales, rhonchi, 

tachypnea, wheezes


Cardiovascular exam: PRESENT: RRR, +S1, +S2


Vascular exam: PRESENT: normal capillary refill


GI/Abdominal exam: PRESENT: normal bowel sounds, soft, DAY drain has some 

brownish fluid in the bag, entry site is unremarkable.  ABSENT: distended, 

guarding, rebound


Extremities exam: PRESENT: pedal edema, other - 3+ pitting edema below the knees

bilaterally.  ABSENT: clubbing


Musculoskeletal exam: PRESENT: normal inspection.  ABSENT: deformity


Neurological exam: PRESENT: alert, awake, oriented to person, oriented to place,

oriented to time, oriented to situation


Psychiatric exam: PRESENT: appropriate affect, normal mood


Skin exam: PRESENT: dry, warm





Results


Laboratory Results: 


                                        





                                 01/12/19 06:48 





                                 01/12/19 06:48 





                                        





01/10/19 14:45   Pelvic Fluid   Gram Stain - Final








Impressions: 


                                        





Abdomen/Pelvis CT  01/09/19 15:01


IMPRESSION:  1.  Sigmoid diverticulosis.  Cannot exclude mild associated 

inflammation.


2.  There is a large irregularly-shaped fluid collection in the lower abdomen/ 

upper pelvis as described.  This is concerning for an abscess.  The exact origin

is not clear.  Is there clinical evidence of or history of appendicitis?


 








Percutaneous Drainage  01/10/19 00:00


IMPRESSION:  Successful CT-guided drainage of pelvic abscess.


 








Abdomen Ultrasound  01/11/19 00:00


IMPRESSION:


 


No definitive abscess in the region scanned


 


 


copyright 2011 [employer]- All Rights Reserved


 








Guidance Fluoroscopy  01/13/19 08:00


IMPRESSION:  No pneumothorax postprocedure.  Venous access catheter tip at the 

junction of the left subclavian vein and SVC.  No pneumothorax postprocedure.


 








Chest X-Ray  01/13/19 08:45


IMPRESSION:  No pneumothorax postprocedure.  Venous access catheter tip at the 

junction of the left subclavian vein and SVC.  No pneumothorax postprocedure.


 














Assessment & Plan





- Diagnosis


(1) Abscess of abdominal cavity


Is this a current diagnosis for this admission?: Yes   


Plan: 


GI wants to treat her for complete course of therapy with IV antibiotics.  She 

got a Schaefer catheter placed.  Culture shows 2 organisms, 1 of which is an E. 

coli.  Percutaneous drain management per surgery.








(2) Ulcerative colitis


Qualifiers: 


   Ulcerative colitis location: unspecified ulcerative colitis location   

Digestive disease complication type: with rectal bleeding   Qualified Code(s): 

K51.911 - Ulcerative colitis, unspecified with rectal bleeding   


Is this a current diagnosis for this admission?: Yes   


Plan: 


She is being managed medically by gastroenterology. 








- Time


Time Spent with patient: 15-24 minutes

## 2019-01-13 NOTE — PDOC PROGRESS REPORT
Subjective


Progress Note for:: 01/13/19


Subjective:: 





58-year-old female status post insertion of a percutaneous drain for an intra-

abdominal abscess.  The patient is in need of long-term intravenous access for 

antibiotic administration.  Currently, the patient reports that her abdominal 

pain is improving.  Her appetite is good.  She is afebrile.  She denies chest 

pain, shortness of breath, blurry vision, orthostasis, headache, nausea, 

vomiting, melena, hematochezia, hematemesis, fatigue, malaise.


Reason For Visit: 


ULCERATIVE COLITIS








Physical Exam


Vital Signs: 


                                        











Temp Pulse Resp BP Pulse Ox


 


 97.8 F   77   16   159/83 H  96 


 


 01/13/19 03:08  01/13/19 03:08  01/13/19 03:08  01/13/19 03:08  01/13/19 03:08








                                 Intake & Output











 01/12/19 01/13/19 01/14/19





 06:59 06:59 06:59


 


Intake Total 1840 1900 


 


Output Total 30 10 


 


Balance 1810 1890 


 


Weight 85.2 kg 86.2 kg 











Respiratory exam: PRESENT: clear to auscultation umu.  ABSENT: chest wall 

tenderness


Cardiovascular exam: PRESENT: RRR





Results


Laboratory Results: 


                                        





                                 01/12/19 06:48 





                                 01/12/19 06:48 





                                        











  01/12/19 01/12/19 01/12/19





  06:48 06:48 06:48


 


WBC  8.7  


 


RBC  3.60 L  


 


Hgb  10.8 L D  


 


Hct  31.3 L  


 


MCV  87  


 


MCH  30.0  


 


MCHC  34.5  


 


RDW  16.2 H  


 


Plt Count  560 H  


 


Sodium   133.2 L 


 


Potassium   4.2 


 


Chloride   97 L 


 


Carbon Dioxide   28 


 


Anion Gap   8 


 


BUN   13 


 


Creatinine   0.33 L 


 


Est GFR ( Amer)   > 60 


 


Est GFR (Non-Af Amer)   > 60 


 


Glucose   92 


 


Calcium   8.0 L 


 


C-Reactive Protein    59.2 H











Impressions: 


                                        





Chest X-Ray  01/09/19 00:00


IMPRESSION:  NO ACUTE RADIOGRAPHIC FINDING IN THE CHEST.


 








Abdomen/Pelvis CT  01/09/19 15:01


IMPRESSION:  1.  Sigmoid diverticulosis.  Cannot exclude mild associated 

inflammation.


2.  There is a large irregularly-shaped fluid collection in the lower abdomen/ 

upper pelvis as described.  This is concerning for an abscess.  The exact origin

is not clear.  Is there clinical evidence of or history of appendicitis?


 








Percutaneous Drainage  01/10/19 00:00


IMPRESSION:  Successful CT-guided drainage of pelvic abscess.


 








Abdomen Ultrasound  01/11/19 00:00


IMPRESSION:


 


No definitive abscess in the region scanned


 


 


copyright 2011 [employer]- All Rights Reserved


 














Assessment & Plan





- Diagnosis


(1) Abscess of abdominal cavity


Is this a current diagnosis for this admission?: Yes   





(2) Phlebosclerosis


Is this a current diagnosis for this admission?: Yes   





- Plan Summary


Plan Summary: 





There is a 58-year-old female in need of long-term IV access for home antibiotic

administration.  Plan for Schaefer catheter today.  Risks/benefits discussed, 

informed consent obtained, and all questions answered.

## 2019-01-14 RX ADMIN — IMIPENEM AND CILASTATIN SODIUM SCH MLS/HR: 500; 500 INJECTION, POWDER, FOR SOLUTION INTRAVENOUS at 15:49

## 2019-01-14 RX ADMIN — MESALAMINE SCH MG: 400 CAPSULE, DELAYED RELEASE ORAL at 23:17

## 2019-01-14 RX ADMIN — METRONIDAZOLE SCH MLS/HR: 500 INJECTION, SOLUTION INTRAVENOUS at 01:49

## 2019-01-14 RX ADMIN — FLUOXETINE SCH MG: 20 CAPSULE ORAL at 09:51

## 2019-01-14 RX ADMIN — METHYLPREDNISOLONE SODIUM SUCCINATE SCH MG: 40 INJECTION, POWDER, FOR SOLUTION INTRAMUSCULAR; INTRAVENOUS at 01:49

## 2019-01-14 RX ADMIN — IMIPENEM AND CILASTATIN SODIUM SCH MLS/HR: 500; 500 INJECTION, POWDER, FOR SOLUTION INTRAVENOUS at 03:02

## 2019-01-14 RX ADMIN — HEPARIN SODIUM SCH UNIT: 5000 INJECTION, SOLUTION INTRAVENOUS; SUBCUTANEOUS at 09:51

## 2019-01-14 RX ADMIN — IMIPENEM AND CILASTATIN SODIUM SCH MLS/HR: 500; 500 INJECTION, POWDER, FOR SOLUTION INTRAVENOUS at 08:06

## 2019-01-14 RX ADMIN — AMPICILLIN SCH MLS/HR: 2 INJECTION, POWDER, FOR SOLUTION INTRAVENOUS at 23:17

## 2019-01-14 RX ADMIN — SODIUM CHLORIDE PRN MLS/HR: 0.45 INJECTION, SOLUTION INTRAVENOUS at 08:11

## 2019-01-14 RX ADMIN — HEPARIN SODIUM SCH UNIT: 5000 INJECTION, SOLUTION INTRAVENOUS; SUBCUTANEOUS at 21:58

## 2019-01-14 RX ADMIN — IMIPENEM AND CILASTATIN SODIUM SCH MLS/HR: 500; 500 INJECTION, POWDER, FOR SOLUTION INTRAVENOUS at 21:23

## 2019-01-14 RX ADMIN — METHYLPREDNISOLONE SODIUM SUCCINATE SCH MG: 40 INJECTION, POWDER, FOR SOLUTION INTRAMUSCULAR; INTRAVENOUS at 18:09

## 2019-01-14 RX ADMIN — METHYLPREDNISOLONE SODIUM SUCCINATE SCH MG: 40 INJECTION, POWDER, FOR SOLUTION INTRAMUSCULAR; INTRAVENOUS at 09:50

## 2019-01-14 RX ADMIN — AMPICILLIN SCH MLS/HR: 2 INJECTION, POWDER, FOR SOLUTION INTRAVENOUS at 18:08

## 2019-01-14 RX ADMIN — METRONIDAZOLE SCH MLS/HR: 500 INJECTION, SOLUTION INTRAVENOUS at 09:51

## 2019-01-14 NOTE — PDOC PROGRESS REPORT
Subjective


Progress Note for:: 01/14/19


Subjective:: 





No adverse events overnight.  No new complaints.  She would like to have some 

physical therapy.  She is been eating and drinking without difficulty.


Reason For Visit: 


ULCERATIVE COLITIS








Physical Exam


Vital Signs: 


                                        











Temp Pulse Resp BP Pulse Ox


 


 98.0 F   97   16   135/97 H  97 


 


 01/14/19 15:33  01/14/19 15:33  01/14/19 15:33  01/14/19 15:33  01/14/19 15:33








                                 Intake & Output











 01/13/19 01/14/19 01/15/19





 06:59 06:59 06:59


 


Intake Total 2000 2590 1440


 


Output Total 10 1560 


 


Balance 1990 1030 1440


 


Weight 86.2 kg 85.9 kg 








General appearance: PRESENT: no acute distress, cooperative, disheveled, 

morbidly obese


Respiratory exam: PRESENT: clear to auscultation umu, symmetrical, unlabored.  

ABSENT: accessory muscle use, prolonged expiratory phas, rales, rhonchi, 

tachypnea, wheezes


Cardiovascular exam: PRESENT: RRR, +S1, +S2


Vascular exam: PRESENT: normal capillary refill


GI/Abdominal exam: PRESENT: normal bowel sounds, soft, DAY drain has some 

brownish fluid in the bag, entry site is unremarkable.  ABSENT: distended, 

guarding, rebound


Extremities exam: PRESENT: pedal edema, other - 3+ pitting edema below the knees

bilaterally.  ABSENT: clubbing


Musculoskeletal exam: PRESENT: normal inspection.  ABSENT: deformity


Neurological exam: PRESENT: alert, awake, oriented to person, oriented to place,

oriented to time, oriented to situation


Psychiatric exam: PRESENT: appropriate affect, normal mood


Skin exam: PRESENT: dry, warm





Results


Laboratory Results: 


                                        





                                 01/12/19 06:48 





                                 01/12/19 06:48 





                                        





01/10/19 14:45   Pelvic Fluid   Gram Stain - Final


01/10/19 14:45   Pelvic Fluid   Body Fluid Culture - Final


                            Escherichia Coli


                            Enterococcus Faecalis(Group D)


                            No Anaerobic Organisms








Impressions: 


                                        





Abdomen/Pelvis CT  01/09/19 15:01


IMPRESSION:  1.  Sigmoid diverticulosis.  Cannot exclude mild associated 

inflammation.


2.  There is a large irregularly-shaped fluid collection in the lower abdomen/ 

upper pelvis as described.  This is concerning for an abscess.  The exact origin

is not clear.  Is there clinical evidence of or history of appendicitis?


 








Percutaneous Drainage  01/10/19 00:00


IMPRESSION:  Successful CT-guided drainage of pelvic abscess.


 








Abdomen Ultrasound  01/11/19 00:00


IMPRESSION:


 


No definitive abscess in the region scanned


 


 


copyright 2011 [employer]- All Rights Reserved


 








Guidance Fluoroscopy  01/13/19 08:00


IMPRESSION:  No pneumothorax postprocedure.  Venous access catheter tip at the 

junction of the left subclavian vein and SVC.  No pneumothorax postprocedure.


 








Chest X-Ray  01/13/19 08:45


IMPRESSION:  No pneumothorax postprocedure.  Venous access catheter tip at the 

junction of the left subclavian vein and SVC.  No pneumothorax postprocedure.


 














Assessment & Plan





- Diagnosis


(1) Abscess of abdominal cavity


Is this a current diagnosis for this admission?: Yes   


Plan: 


Culture came back positive for E.coli and Enterococcus.  Anaerobic culture 

negative.  Stopping metronidazole and starting ampicillin.  Surgery wants to 

leave the drain in and have them come see her in the office in 7-10 days.








(2) Ulcerative colitis


Qualifiers: 


   Ulcerative colitis location: unspecified ulcerative colitis location   

Digestive disease complication type: with rectal bleeding   Qualified Code(s): 

K51.911 - Ulcerative colitis, unspecified with rectal bleeding   


Is this a current diagnosis for this admission?: Yes   


Plan: 


Dr. Acosta has written for all of her medications, including steroid regimen.  We

would need to contact him for recommendations regarding her medications 

including transitioning her over to orals.  We will also have physical therapy 

evaluate her, anticipate she will need to go to SNF.








- Time


Time Spent with patient: 15-24 minutes

## 2019-01-14 NOTE — PDOC PROGRESS REPORT
Subjective


Progress Note for:: 01/14/19


Subjective:: 





Overall she seems to be doing better.  She is having a couple of bowel movements

a day and they have better formed.  Her appetite is still poor but she is eating

better and drinking supplements.  She brought some boost from home that she 

likes.  She denies abdominal pain.  Her main complaint is that of being weak in 

the legs.  She has been needing help getting out of bed but sometimes she is 

able to do it by herself like she did tonight getting up to the bathroom without

help.  She still has some drainage from her abdominal catheter.  She had a 

Schaefer line placed yesterday.


Her Flagyl was changed to ampicillin due to abdominal fluid culture growing E. 

coli and she continues on imipenem.  She continues on 60 mg of Solu-Medrol 

daily.  She had an abdominal ultrasound 2 days ago that showed no collections in

her abdomen.


Reason For Visit: 


ULCERATIVE COLITIS








Physical Exam


Vital Signs: 


                                        











Temp Pulse Resp BP Pulse Ox


 


 98.5 F   89   18   138/77 H  97 


 


 01/14/19 20:16  01/14/19 20:16  01/14/19 20:16  01/14/19 20:16  01/14/19 20:16








                                 Intake & Output











 01/13/19 01/14/19 01/15/19





 06:59 06:59 06:59


 


Intake Total 2000 2590 1540


 


Output Total 10 1560 


 


Balance 1990 1030 1540


 


Weight 86.2 kg 85.9 kg 











Exam: 





General: Patient is alert and looks better





HEENT: There is no pallor or jaundice.  PERRLA.  Oropharynx normal





Respiratory: No chest deformity. No respiratory distress.  Chest wall 

palpitation was unremarkable.  Breath sounds were normal





Cardiovascular: Heart sounds 1 and 2 normal with no murmurs.





Abdominal: Not distended.  She has a drainage tube in place





Extremities: There is bilateral edema in the lower legs but these appear better 

than when she came in





Neurological: Alert and oriented x4.  Grossly nonfocal.  Normal speech





Skin: No significant rash





Psychological: Normal affect





Results


Laboratory Results: 


                                        





                                 01/12/19 06:48 





                                 01/12/19 06:48 





                                        





01/09/19 20:38   Blood   Blood Culture - Final


                            NO GROWTH IN 5 DAYS


01/09/19 20:17   Blood   Blood Culture - Final


                            NO GROWTH IN 5 DAYS


01/10/19 14:45   Pelvic Fluid   Gram Stain - Final


01/10/19 14:45   Pelvic Fluid   Body Fluid Culture - Final


                            Escherichia Coli


                            Enterococcus Faecalis(Group D)


                            No Anaerobic Organisms








Impressions: 


                                        





Abdomen/Pelvis CT  01/09/19 15:01


IMPRESSION:  1.  Sigmoid diverticulosis.  Cannot exclude mild associated 

inflammation.


2.  There is a large irregularly-shaped fluid collection in the lower abdomen/ 

upper pelvis as described.  This is concerning for an abscess.  The exact origin

is not clear.  Is there clinical evidence of or history of appendicitis?


 








Percutaneous Drainage  01/10/19 00:00


IMPRESSION:  Successful CT-guided drainage of pelvic abscess.


 








Abdomen Ultrasound  01/11/19 00:00


IMPRESSION:


 


No definitive abscess in the region scanned


 


 


copyright 2011 [employer]- All Rights Reserved


 








Guidance Fluoroscopy  01/13/19 08:00


IMPRESSION:  No pneumothorax postprocedure.  Venous access catheter tip at the 

junction of the left subclavian vein and SVC.  No pneumothorax postprocedure.


 








Chest X-Ray  01/13/19 08:45


IMPRESSION:  No pneumothorax postprocedure.  Venous access catheter tip at the 

junction of the left subclavian vein and SVC.  No pneumothorax postprocedure.


 














Assessment & Plan





- Diagnosis


(1) Crohn's disease


Is this a current diagnosis for this admission?: Yes   


Plan: 


Her recent endoscopic picture is more consistent with Crohn's colitis.  Her 

bowels has improved on the current meds.  Upon discharge she should be switched 

to prednisone 60 mg daily for 1 week, 40 mg daily for 3 weeks and start tapering

by 5 mg every week after that.  I will also start her on mercaptopurine 50 mg 

daily as a steroid sparing agent.  She will be sent for TP MT.  She will also 

continue with Lialda 4.8 g daily which she was taking prior to being admitted.








(2) Abscess of abdominal cavity


Is this a current diagnosis for this admission?: Yes   


Plan: 


Ultrasound a few days ago did not show abdominal collection.  Dr. Thornton will be

seeing the patient in a week and he would decide on when to remove drainage 

catheter.  She may need a CAT scan of the pelvic and abdomen for better 

evaluation of her disease.  She may also need to continue with IV antibiotics 

for a few more days if she is discharged to the rehab center








(3) Elevated alkaline phosphatase level


Is this a current diagnosis for this admission?: Yes   





(4) Hypoalbuminemia


Is this a current diagnosis for this admission?: Yes   


Plan: 


She is eating better and will continue with supplements








(5) Edema due to hypoalbuminemia


Is this a current diagnosis for this admission?: Yes   


Plan: 


Her leg swelling is better but hopefully as she eats better and ambulates this 

will improve 98

## 2019-01-15 LAB
ALBUMIN SERPL-MCNC: 2.3 G/DL (ref 3.5–5)
ALP SERPL-CCNC: 216 U/L (ref 38–126)
ALT SERPL-CCNC: 34 U/L (ref 9–52)
AST SERPL-CCNC: 13 U/L (ref 14–36)
BILIRUB DIRECT SERPL-MCNC: 0.2 MG/DL (ref 0–0.4)
BILIRUB SERPL-MCNC: 0.6 MG/DL (ref 0.2–1.3)
PROT SERPL-MCNC: 4.3 G/DL (ref 6.3–8.2)

## 2019-01-15 RX ADMIN — AMPICILLIN SCH MLS/HR: 2 INJECTION, POWDER, FOR SOLUTION INTRAVENOUS at 18:11

## 2019-01-15 RX ADMIN — METHYLPREDNISOLONE SODIUM SUCCINATE SCH MG: 40 INJECTION, POWDER, FOR SOLUTION INTRAMUSCULAR; INTRAVENOUS at 02:08

## 2019-01-15 RX ADMIN — AMPICILLIN SCH MLS/HR: 2 INJECTION, POWDER, FOR SOLUTION INTRAVENOUS at 11:11

## 2019-01-15 RX ADMIN — FLUOXETINE SCH MG: 20 CAPSULE ORAL at 09:26

## 2019-01-15 RX ADMIN — AMPICILLIN SCH MLS/HR: 2 INJECTION, POWDER, FOR SOLUTION INTRAVENOUS at 05:39

## 2019-01-15 RX ADMIN — MERCAPTOPURINE SCH MG: 50 TABLET ORAL at 09:27

## 2019-01-15 RX ADMIN — METHYLPREDNISOLONE SODIUM SUCCINATE SCH MG: 40 INJECTION, POWDER, FOR SOLUTION INTRAMUSCULAR; INTRAVENOUS at 09:27

## 2019-01-15 RX ADMIN — IMIPENEM AND CILASTATIN SODIUM SCH MLS/HR: 500; 500 INJECTION, POWDER, FOR SOLUTION INTRAVENOUS at 02:09

## 2019-01-15 RX ADMIN — IMIPENEM AND CILASTATIN SODIUM SCH MLS/HR: 500; 500 INJECTION, POWDER, FOR SOLUTION INTRAVENOUS at 15:27

## 2019-01-15 RX ADMIN — MESALAMINE SCH MG: 400 CAPSULE, DELAYED RELEASE ORAL at 09:26

## 2019-01-15 RX ADMIN — MESALAMINE SCH MG: 400 CAPSULE, DELAYED RELEASE ORAL at 13:54

## 2019-01-15 RX ADMIN — HEPARIN SODIUM SCH UNIT: 5000 INJECTION, SOLUTION INTRAVENOUS; SUBCUTANEOUS at 21:01

## 2019-01-15 RX ADMIN — METHYLPREDNISOLONE SODIUM SUCCINATE SCH MG: 40 INJECTION, POWDER, FOR SOLUTION INTRAMUSCULAR; INTRAVENOUS at 18:10

## 2019-01-15 RX ADMIN — IMIPENEM AND CILASTATIN SODIUM SCH MLS/HR: 500; 500 INJECTION, POWDER, FOR SOLUTION INTRAVENOUS at 09:27

## 2019-01-15 RX ADMIN — HEPARIN SODIUM SCH UNIT: 5000 INJECTION, SOLUTION INTRAVENOUS; SUBCUTANEOUS at 09:27

## 2019-01-15 RX ADMIN — AMPICILLIN SCH MLS/HR: 2 INJECTION, POWDER, FOR SOLUTION INTRAVENOUS at 23:51

## 2019-01-15 RX ADMIN — IMIPENEM AND CILASTATIN SODIUM SCH MLS/HR: 500; 500 INJECTION, POWDER, FOR SOLUTION INTRAVENOUS at 21:01

## 2019-01-15 RX ADMIN — MESALAMINE SCH MG: 400 CAPSULE, DELAYED RELEASE ORAL at 18:11

## 2019-01-15 NOTE — PDOC PROGRESS REPORT
Subjective


Progress Note for:: 01/15/19


Subjective:: 


The patient is a 58-year-old, chronically ill-appearing, female with past 

medical history of ulcerative colitis, psoriatic arthritic, depression, and 

obesity who was admitted 1/9/19 for ulcerative colitis.





Patient was seen on morning rounds.  She was found resting in the recliner 

comfortably on room air.  She reports that her pain is well controlled at 

present.  She states that her appetite has returned and she denies nausea, 

vomiting, and diarrhea.  She is looking forward to discharge to SNF for short-

term rehabilitation with goal of being able to return to work as a .


She further denies fever, chills, body aches, chest pain, palpitations, and 

dyspnea.


She has no new questions or concerns.


No concerns per nursing.


Reason For Visit: 


ULCERATIVE COLITIS








Physical Exam


Vital Signs: 


                                        











Temp Pulse Resp BP Pulse Ox


 


 97.6 F   100   15   130/70 H  98 


 


 01/15/19 11:17  01/15/19 11:17  01/15/19 11:17  01/15/19 11:17  01/15/19 11:17








                                 Intake & Output











 01/14/19 01/15/19 01/16/19





 06:59 06:59 06:59


 


Intake Total 2590 2040 500


 


Output Total 1560  


 


Balance 1030 2040 500


 


Weight 85.9 kg 85.9 kg 











General appearance: PRESENT: no acute distress, cooperative, disheveled, obese, 

well-developed, other - Chronically ill-appearing


Head exam: PRESENT: atraumatic, normocephalic


Eye exam: PRESENT: conjunctiva pink, EOMI, PERRLA.  ABSENT: scleral icterus


Ear exam: PRESENT: normal external ear exam


Mouth exam: PRESENT: moist, tongue midline


Neck exam: ABSENT: carotid bruit, JVD, lymphadenopathy, thyromegaly


Respiratory exam: PRESENT: clear to auscultation umu, symmetrical, unlabored.  

ABSENT: rales, rhonchi, wheezes


Cardiovascular exam: PRESENT: RRR, +S1, +S2.  ABSENT: diastolic murmur, rubs, 

systolic murmur


Pulses: PRESENT: normal dorsalis pedis pul


Vascular exam: PRESENT: normal capillary refill


GI/Abdominal exam: PRESENT: normal bowel sounds, soft, other - DAY drain.  

ABSENT: distended, guarding, mass, organolmegaly, rebound, tenderness


Rectal exam: PRESENT: deferred


Extremities exam: PRESENT: full ROM, pedal edema - +3 pitting edema BLE.  

ABSENT: calf tenderness, clubbing


Neurological exam: PRESENT: alert, awake, oriented to person, oriented to place,

oriented to time, oriented to situation, CN II-XII grossly intact.  ABSENT: 

motor sensory deficit


Psychiatric exam: PRESENT: appropriate affect, normal mood.  ABSENT: homicidal 

ideation, suicidal ideation


Skin exam: PRESENT: dry, intact, warm.  ABSENT: cyanosis, rash





Results


Laboratory Results: 


                                        





                                 01/12/19 06:48 





                                 01/12/19 06:48 





                                        











  01/15/19





  00:51


 


Total Bilirubin  0.6


 


AST  13 L


 


ALT  34


 


Alkaline Phosphatase  216 H


 


Total Protein  4.3 L


 


Albumin  2.3 L








                                        





01/09/19 20:38   Blood   Blood Culture - Final


                            NO GROWTH IN 5 DAYS


01/09/19 20:17   Blood   Blood Culture - Final


                            NO GROWTH IN 5 DAYS








Impressions: 


                                        





Abdomen/Pelvis CT  01/09/19 15:01


IMPRESSION:  1.  Sigmoid diverticulosis.  Cannot exclude mild associated 

inflammation.


2.  There is a large irregularly-shaped fluid collection in the lower abdomen/ 

upper pelvis as described.  This is concerning for an abscess.  The exact origin

is not clear.  Is there clinical evidence of or history of appendicitis?


 








Percutaneous Drainage  01/10/19 00:00


IMPRESSION:  Successful CT-guided drainage of pelvic abscess.


 








Abdomen Ultrasound  01/11/19 00:00


IMPRESSION:


 


No definitive abscess in the region scanned


 


 


copyright 2011 [employer]- All Rights Reserved


 








Guidance Fluoroscopy  01/13/19 08:00


IMPRESSION:  No pneumothorax postprocedure.  Venous access catheter tip at the 

junction of the left subclavian vein and SVC.  No pneumothorax postprocedure.


 








Chest X-Ray  01/13/19 08:45


IMPRESSION:  No pneumothorax postprocedure.  Venous access catheter tip at the 

junction of the left subclavian vein and SVC.  No pneumothorax postprocedure.


 














Assessment & Plan





- Diagnosis


(1) Abscess of abdominal cavity


Is this a current diagnosis for this admission?: Yes   


Plan: 


Now status post percutaneous drain placed by surgery.


Blood cultures have no growth at 5 days.


Pelvic fluid grew E. coli and enterococcus faecialis





Patient will be discharged with DAY drain in place; to follow-up with surgery in 

7-10 days.


Will defer antibiotic therapy to gastroenterology.








(2) Crohn's disease


Qualifiers: 


   Gastrointestinal tract location: large intestine   Digestive disease 

complication type: with abscess   Qualified Code(s): K50.114 - Crohn's disease 

of large intestine with abscess   


Is this a current diagnosis for this admission?: Yes   


Plan: 


Primary plan per Dr. Michelle; prednisone taper, mercaptopurine, mesalamine, and 

Lialda per his expertise.


Cultures and antibiotics as above.








(3) Anasarca


Is this a current diagnosis for this admission?: Yes   


Plan: 


Secondary to hypoalbuminemia; likely multifactorial secondary to poor nutrition 

and poor absorption.


Dietary is consulted; continue high protein diet with dietary supplements.


BRIAN hose.








(4) Depression


Qualifiers: 


   Depression Type: unspecified   Qualified Code(s): F32.9 - Major depressive 

disorder, single episode, unspecified   


Is this a current diagnosis for this admission?: Yes   


Plan: 


continue home dose Prozac.








(5) Debility, unspecified


Is this a current diagnosis for this admission?: Yes   


Plan: 


PT/OT consultations ordered; recommending SNF for short term rehab.


Discharge planning is consulted.








- Time


Time Spent with patient: 15-24 minutes


Anticipated discharge: SNF


Within: Other - Medically stable for discharge at GI's discretion.

## 2019-01-16 LAB
ANION GAP SERPL CALC-SCNC: 5 MMOL/L (ref 5–19)
BUN SERPL-MCNC: 9 MG/DL (ref 7–20)
CALCIUM: 8.4 MG/DL (ref 8.4–10.2)
CHLORIDE SERPL-SCNC: 99 MMOL/L (ref 98–107)
CO2 SERPL-SCNC: 34 MMOL/L (ref 22–30)
ERYTHROCYTE [DISTWIDTH] IN BLOOD BY AUTOMATED COUNT: 16.1 % (ref 11.5–14)
GLUCOSE SERPL-MCNC: 137 MG/DL (ref 75–110)
HCT VFR BLD CALC: 28.6 % (ref 36–47)
HGB BLD-MCNC: 9.6 G/DL (ref 12–15.5)
MCH RBC QN AUTO: 29.4 PG (ref 27–33.4)
MCHC RBC AUTO-ENTMCNC: 33.5 G/DL (ref 32–36)
MCV RBC AUTO: 88 FL (ref 80–97)
PLATELET # BLD: 424 10^3/UL (ref 150–450)
POTASSIUM SERPL-SCNC: 4.7 MMOL/L (ref 3.6–5)
RBC # BLD AUTO: 3.26 10^6/UL (ref 3.72–5.28)
SODIUM SERPL-SCNC: 137.9 MMOL/L (ref 137–145)
WBC # BLD AUTO: 14.5 10^3/UL (ref 4–10.5)

## 2019-01-16 RX ADMIN — MESALAMINE SCH MG: 400 CAPSULE, DELAYED RELEASE ORAL at 17:56

## 2019-01-16 RX ADMIN — HEPARIN SODIUM SCH UNIT: 5000 INJECTION, SOLUTION INTRAVENOUS; SUBCUTANEOUS at 21:45

## 2019-01-16 RX ADMIN — METHYLPREDNISOLONE SODIUM SUCCINATE SCH MG: 40 INJECTION, POWDER, FOR SOLUTION INTRAMUSCULAR; INTRAVENOUS at 09:28

## 2019-01-16 RX ADMIN — AMPICILLIN SCH MLS/HR: 2 INJECTION, POWDER, FOR SOLUTION INTRAVENOUS at 11:04

## 2019-01-16 RX ADMIN — MERCAPTOPURINE SCH MG: 50 TABLET ORAL at 09:28

## 2019-01-16 RX ADMIN — AMPICILLIN SCH MLS/HR: 2 INJECTION, POWDER, FOR SOLUTION INTRAVENOUS at 23:20

## 2019-01-16 RX ADMIN — IMIPENEM AND CILASTATIN SODIUM SCH MLS/HR: 500; 500 INJECTION, POWDER, FOR SOLUTION INTRAVENOUS at 21:46

## 2019-01-16 RX ADMIN — IMIPENEM AND CILASTATIN SODIUM SCH MLS/HR: 500; 500 INJECTION, POWDER, FOR SOLUTION INTRAVENOUS at 02:43

## 2019-01-16 RX ADMIN — AMPICILLIN SCH MLS/HR: 2 INJECTION, POWDER, FOR SOLUTION INTRAVENOUS at 17:47

## 2019-01-16 RX ADMIN — METHYLPREDNISOLONE SODIUM SUCCINATE SCH MG: 40 INJECTION, POWDER, FOR SOLUTION INTRAMUSCULAR; INTRAVENOUS at 17:56

## 2019-01-16 RX ADMIN — IMIPENEM AND CILASTATIN SODIUM SCH MLS/HR: 500; 500 INJECTION, POWDER, FOR SOLUTION INTRAVENOUS at 08:14

## 2019-01-16 RX ADMIN — METHYLPREDNISOLONE SODIUM SUCCINATE SCH MG: 40 INJECTION, POWDER, FOR SOLUTION INTRAMUSCULAR; INTRAVENOUS at 02:35

## 2019-01-16 RX ADMIN — IMIPENEM AND CILASTATIN SODIUM SCH MLS/HR: 500; 500 INJECTION, POWDER, FOR SOLUTION INTRAVENOUS at 15:40

## 2019-01-16 RX ADMIN — MESALAMINE SCH MG: 400 CAPSULE, DELAYED RELEASE ORAL at 13:34

## 2019-01-16 RX ADMIN — AMPICILLIN SCH MLS/HR: 2 INJECTION, POWDER, FOR SOLUTION INTRAVENOUS at 05:09

## 2019-01-16 RX ADMIN — MESALAMINE SCH MG: 400 CAPSULE, DELAYED RELEASE ORAL at 09:28

## 2019-01-16 RX ADMIN — HEPARIN SODIUM SCH UNIT: 5000 INJECTION, SOLUTION INTRAVENOUS; SUBCUTANEOUS at 09:36

## 2019-01-16 RX ADMIN — FLUOXETINE SCH MG: 20 CAPSULE ORAL at 05:09

## 2019-01-16 NOTE — PDOC PROGRESS REPORT
Subjective


Progress Note for:: 01/16/19


Subjective:: 


The patient is a 58-year-old, chronically ill-appearing, female with past 

medical history of ulcerative colitis, psoriatic arthritic, depression, and 

obesity who was admitted 1/9/19 for ulcerative colitis.





Patient was seen on morning rounds.  She was found resting in the recliner 

comfortably on room air.  She reports that her pain is well controlled at 

present.  She states that her appetite has returned and she denies nausea, 

vomiting, and diarrhea.  She does report that while up to the bedside commode 

yesterday her legs gave out on her and she had to be assisted to the floor by 

nursing staff.  She denies a true "fall" or associated injury; however, required

multiple staff members in order to back into bed.  She did work with physical 

therapy today and did not have a similar occurrence.  Overall, she is feeling 

much better, and remains optimistic about her discharge to acute rehabilitation 

to regain her strength and return to work as a .


She further denies fever, chills, body aches, chest pain, palpitations, and 

dyspnea.


She has no new questions or concerns.





No concerns per nursing.


Reason For Visit: 


ULCERATIVE COLITIS








Physical Exam


Vital Signs: 


                                        











Temp Pulse Resp BP Pulse Ox


 


 98.2 F   88   20   131/70 H  99 


 


 01/16/19 15:30  01/16/19 15:30  01/16/19 15:30  01/16/19 15:30  01/16/19 15:30








                                 Intake & Output











 01/15/19 01/16/19 01/17/19





 06:59 06:59 06:59


 


Intake Total 2040 2200 1180


 


Output Total  40 


 


Balance 2040 2160 1180


 


Weight 85.9 kg  











General appearance: PRESENT: no acute distress, cooperative, obese, well-

developed, well-nourished, other - Chronically ill-appearing


Head exam: PRESENT: atraumatic, normocephalic


Eye exam: PRESENT: conjunctiva pink, EOMI, PERRLA.  ABSENT: scleral icterus


Ear exam: PRESENT: normal external ear exam


Mouth exam: PRESENT: moist, tongue midline


Neck exam: ABSENT: carotid bruit, JVD, lymphadenopathy, thyromegaly


Respiratory exam: PRESENT: clear to auscultation umu.  ABSENT: rales, rhonchi, 

wheezes


Cardiovascular exam: PRESENT: RRR.  ABSENT: diastolic murmur, rubs, systolic 

murmur


Pulses: PRESENT: normal dorsalis pedis pul


Vascular exam: PRESENT: normal capillary refill


GI/Abdominal exam: PRESENT: normal bowel sounds, soft, other - DAY drain.  

ABSENT: distended, guarding, mass, organolmegaly, rebound, tenderness


Rectal exam: PRESENT: deferred


Extremities exam: PRESENT: full ROM, +2 edema - Nonpitting edema BLE.  ABSENT: 

calf tenderness, clubbing, pedal edema


Neurological exam: PRESENT: alert, awake, oriented to person, oriented to place,

oriented to time, oriented to situation, CN II-XII grossly intact.  ABSENT: 

motor sensory deficit


Psychiatric exam: PRESENT: appropriate affect, normal mood.  ABSENT: homicidal 

ideation, suicidal ideation


Skin exam: PRESENT: dry, intact, warm.  ABSENT: cyanosis, rash





Results


Laboratory Results: 


                                        





                                 01/16/19 10:29 





                                 01/16/19 10:29 





                                        











  01/16/19 01/16/19





  10:29 10:29


 


WBC   14.5 H


 


RBC   3.26 L


 


Hgb   9.6 L


 


Hct   28.6 L


 


MCV   88


 


MCH   29.4


 


MCHC   33.5


 


RDW   16.1 H


 


Plt Count   424


 


Sodium  137.9 


 


Potassium  4.7 


 


Chloride  99 


 


Carbon Dioxide  34 H 


 


Anion Gap  5 


 


BUN  9 


 


Creatinine  0.28 L 


 


Est GFR ( Amer)  > 60 


 


Est GFR (Non-Af Amer)  > 60 


 


Glucose  137 H 


 


Calcium  8.4 











Impressions: 


                                        





Abdomen/Pelvis CT  01/09/19 15:01


IMPRESSION:  1.  Sigmoid diverticulosis.  Cannot exclude mild associated 

inflammation.


2.  There is a large irregularly-shaped fluid collection in the lower abdomen/ 

upper pelvis as described.  This is concerning for an abscess.  The exact origin

is not clear.  Is there clinical evidence of or history of appendicitis?


 








Percutaneous Drainage  01/10/19 00:00


IMPRESSION:  Successful CT-guided drainage of pelvic abscess.


 








Abdomen Ultrasound  01/11/19 00:00


IMPRESSION:


 


No definitive abscess in the region scanned


 


 


copyright 2011 [employer]- All Rights Reserved


 








Guidance Fluoroscopy  01/13/19 08:00


IMPRESSION:  No pneumothorax postprocedure.  Venous access catheter tip at the j

unction of the left subclavian vein and SVC.  No pneumothorax postprocedure.


 








Chest X-Ray  01/13/19 08:45


IMPRESSION:  No pneumothorax postprocedure.  Venous access catheter tip at the 

junction of the left subclavian vein and SVC.  No pneumothorax postprocedure.


 














Assessment & Plan





- Diagnosis


(1) Abscess of abdominal cavity


Is this a current diagnosis for this admission?: Yes   


Plan: 


Now status post percutaneous drain placed by surgery.


Blood cultures have no growth at 5 days.


Pelvic fluid grew E. coli and enterococcus faecialis





Patient will be discharged with DAY drain in place; to follow-up with surgery in 

7-10 days.


Discussed with gastroenterology last night; recommend continuing IV antibiotics 

for an additional 2-3 days.  Will end course of antibiotic therapy 1/18/19.








(2) Crohn's disease


Qualifiers: 


   Gastrointestinal tract location: large intestine   Digestive disease 

complication type: with abscess   Qualified Code(s): K50.114 - Crohn's disease 

of large intestine with abscess   


Is this a current diagnosis for this admission?: Yes   


Plan: 


Primary plan per Dr. Michelle; prednisone taper, mercaptopurine, mesalamine, and 

Lialda per his expertise.


Cultures and antibiotics as above.








(3) Anasarca


Is this a current diagnosis for this admission?: Yes   


Plan: 


Improved appearance today; decreased edema to all extremities.


Secondary to hypoalbuminemia; likely multifactorial secondary to poor nutrition 

and poor absorption.





Dietary is consulted; continue high protein diet with dietary supplements.


BRIAN hose.








(4) Depression


Qualifiers: 


   Depression Type: unspecified   Qualified Code(s): F32.9 - Major depressive 

disorder, single episode, unspecified   


Is this a current diagnosis for this admission?: Yes   


Plan: 


Continue home dose Prozac.








(5) Debility, unspecified


Is this a current diagnosis for this admission?: Yes   


Plan: 


PT/OT consultations ordered; recommending SNF for short term rehab.


Discharge planning is consulted.








- Time


Time Spent with patient: 15-24 minutes


Medications reviewed and adjusted accordingly: Yes


Anticipated discharge: SNF - Short-term rehab


Within: when bed available - Discussed with GI; cleared for discharge.





- Plan Summary


Plan Summary: 





Complete an additional 2 days of IV antibiotics.  DAY drain to be left in place 

until her follow-up appointment with Dr. Thornton next week.


Medically stable for discharge to short-term rehab.

## 2019-01-17 RX ADMIN — IMIPENEM AND CILASTATIN SODIUM SCH MLS/HR: 500; 500 INJECTION, POWDER, FOR SOLUTION INTRAVENOUS at 08:56

## 2019-01-17 RX ADMIN — IMIPENEM AND CILASTATIN SODIUM SCH MLS/HR: 500; 500 INJECTION, POWDER, FOR SOLUTION INTRAVENOUS at 23:44

## 2019-01-17 RX ADMIN — IMIPENEM AND CILASTATIN SODIUM SCH MLS/HR: 500; 500 INJECTION, POWDER, FOR SOLUTION INTRAVENOUS at 15:53

## 2019-01-17 RX ADMIN — HEPARIN SODIUM SCH UNIT: 5000 INJECTION, SOLUTION INTRAVENOUS; SUBCUTANEOUS at 09:10

## 2019-01-17 RX ADMIN — MESALAMINE SCH MG: 400 CAPSULE, DELAYED RELEASE ORAL at 18:02

## 2019-01-17 RX ADMIN — IMIPENEM AND CILASTATIN SODIUM SCH MLS/HR: 500; 500 INJECTION, POWDER, FOR SOLUTION INTRAVENOUS at 02:59

## 2019-01-17 RX ADMIN — MESALAMINE SCH MG: 400 CAPSULE, DELAYED RELEASE ORAL at 09:10

## 2019-01-17 RX ADMIN — HEPARIN SODIUM SCH UNIT: 5000 INJECTION, SOLUTION INTRAVENOUS; SUBCUTANEOUS at 23:43

## 2019-01-17 RX ADMIN — METHYLPREDNISOLONE SODIUM SUCCINATE SCH MG: 40 INJECTION, POWDER, FOR SOLUTION INTRAMUSCULAR; INTRAVENOUS at 02:59

## 2019-01-17 RX ADMIN — AMPICILLIN SCH MLS/HR: 2 INJECTION, POWDER, FOR SOLUTION INTRAVENOUS at 06:10

## 2019-01-17 RX ADMIN — MERCAPTOPURINE SCH MG: 50 TABLET ORAL at 09:10

## 2019-01-17 RX ADMIN — MESALAMINE SCH MG: 400 CAPSULE, DELAYED RELEASE ORAL at 15:58

## 2019-01-17 RX ADMIN — AMPICILLIN SCH MLS/HR: 2 INJECTION, POWDER, FOR SOLUTION INTRAVENOUS at 18:02

## 2019-01-17 RX ADMIN — METHYLPREDNISOLONE SODIUM SUCCINATE SCH MG: 40 INJECTION, POWDER, FOR SOLUTION INTRAMUSCULAR; INTRAVENOUS at 09:17

## 2019-01-17 RX ADMIN — FLUOXETINE SCH MG: 20 CAPSULE ORAL at 06:06

## 2019-01-17 RX ADMIN — METHYLPREDNISOLONE SODIUM SUCCINATE SCH MG: 40 INJECTION, POWDER, FOR SOLUTION INTRAMUSCULAR; INTRAVENOUS at 18:02

## 2019-01-17 RX ADMIN — AMPICILLIN SCH MLS/HR: 2 INJECTION, POWDER, FOR SOLUTION INTRAVENOUS at 12:54

## 2019-01-17 NOTE — PDOC PROGRESS REPORT
Subjective


Progress Note for:: 01/17/19


Subjective:: 


The patient is a 58-year-old, chronically ill-appearing, female with past 

medical history of ulcerative colitis, psoriatic arthritic, depression, and 

obesity who was admitted 1/9/19 for ulcerative colitis.





Patient was seen on morning rounds.  She was found resting in the recliner 

comfortably on room air.  She denies abdominal pain.  She states that her 

appetite has returned and she denies nausea, vomiting, and diarrhea.  She is 

very encouraged by the improvement in her edema.  


She further denies fever, chills, body aches, chest pain, palpitations, and 

dyspnea.


She has no new questions or concerns; awaiting rehab placement offer.





No concerns per nursing.


Reason For Visit: 


ULCERATIVE COLITIS








Physical Exam


Vital Signs: 


                                        











Temp Pulse Resp BP Pulse Ox


 


 98.3 F   88   18   140/75 H  99 


 


 01/17/19 11:56  01/17/19 11:56  01/17/19 11:56  01/17/19 11:56  01/17/19 11:56








                                 Intake & Output











 01/16/19 01/17/19 01/18/19





 06:59 06:59 06:59


 


Intake Total 2200 2080 100


 


Output Total 40 27 1


 


Balance 2160 2053 99


 


Weight  84.096 kg 











General appearance: PRESENT: no acute distress, cooperative, obese, well-

developed, well-nourished


Head exam: PRESENT: atraumatic, normocephalic


Eye exam: PRESENT: conjunctiva pink, EOMI, PERRLA.  ABSENT: scleral icterus


Ear exam: PRESENT: normal external ear exam


Mouth exam: PRESENT: moist, tongue midline


Neck exam: ABSENT: carotid bruit, JVD, lymphadenopathy, thyromegaly


Respiratory exam: PRESENT: clear to auscultation umu.  ABSENT: rales, rhonchi, 

wheezes


Cardiovascular exam: PRESENT: RRR.  ABSENT: diastolic murmur, rubs, systolic 

murmur


Pulses: PRESENT: normal dorsalis pedis pul


Vascular exam: PRESENT: normal capillary refill


GI/Abdominal exam: PRESENT: normal bowel sounds, soft, other - DAY drain; slight 

cloudy fluid.  ABSENT: distended, guarding, mass, organolmegaly, rebound, 

tenderness


Rectal exam: PRESENT: deferred


Extremities exam: PRESENT: full ROM, +1 edema - nonpitting BLE edema; edema to 

upper extremities has resolved.  ABSENT: calf tenderness, clubbing, pedal edema


Neurological exam: PRESENT: alert, awake, oriented to person, oriented to place,

oriented to time, oriented to situation, CN II-XII grossly intact.  ABSENT: 

motor sensory deficit


Psychiatric exam: PRESENT: appropriate affect, normal mood.  ABSENT: homicidal 

ideation, suicidal ideation


Skin exam: PRESENT: dry, intact, warm.  ABSENT: cyanosis, rash





Results


Laboratory Results: 


                                        





                                 01/16/19 10:29 





                                 01/16/19 10:29 








Impressions: 


                                        





Abdomen/Pelvis CT  01/09/19 15:01


IMPRESSION:  1.  Sigmoid diverticulosis.  Cannot exclude mild associated 

inflammation.


2.  There is a large irregularly-shaped fluid collection in the lower abdomen/ 

upper pelvis as described.  This is concerning for an abscess.  The exact origin

is not clear.  Is there clinical evidence of or history of appendicitis?


 








Percutaneous Drainage  01/10/19 00:00


IMPRESSION:  Successful CT-guided drainage of pelvic abscess.


 








Abdomen Ultrasound  01/11/19 00:00


IMPRESSION:


 


No definitive abscess in the region scanned


 


 


copyright 2011 [employer]- All Rights Reserved


 








Guidance Fluoroscopy  01/13/19 08:00


IMPRESSION:  No pneumothorax postprocedure.  Venous access catheter tip at the 

junction of the left subclavian vein and SVC.  No pneumothorax postprocedure.


 








Chest X-Ray  01/13/19 08:45


IMPRESSION:  No pneumothorax postprocedure.  Venous access catheter tip at the 

junction of the left subclavian vein and SVC.  No pneumothorax postprocedure.


 














Assessment & Plan





- Diagnosis


(1) Abscess of abdominal cavity


Is this a current diagnosis for this admission?: Yes   


Plan: 


Now status post percutaneous drain placed by surgery.


Blood cultures have no growth at 5 days.


Pelvic fluid grew E. coli and enterococcus faecialis





Patient will be discharged with DAY drain in place; to follow-up with surgery in 

7-10 days (1/21/19).


Discussed with gastroenterology; recommend continuing IV antibiotics through 

1/18/19.








(2) Crohn's disease


Qualifiers: 


   Gastrointestinal tract location: large intestine   Digestive disease 

complication type: with abscess   Qualified Code(s): K50.114 - Crohn's disease 

of large intestine with abscess   


Is this a current diagnosis for this admission?: Yes   


Plan: 


Primary plan per Dr. Michelle; prednisone taper, mercaptopurine, mesalamine, and 

Lialda per his expertise.


Cultures and antibiotics as above.








(3) Anasarca


Is this a current diagnosis for this admission?: Yes   


Plan: 


Significantly improved; decreased edema to all extremities.


Secondary to hypoalbuminemia; likely multifactorial secondary to poor nutrition 

and poor absorption.





Dietary is consulted; continue high protein diet with dietary supplements.


BRIAN hose and elevation.








(4) Depression


Qualifiers: 


   Depression Type: unspecified   Qualified Code(s): F32.9 - Major depressive 

disorder, single episode, unspecified   


Is this a current diagnosis for this admission?: Yes   


Plan: 


Continue home dose Prozac.








(5) Debility, unspecified


Is this a current diagnosis for this admission?: Yes   


Plan: 


PT/OT consultations ordered; recommending SNF for short term rehab.


Discharge planning is consulted.








- Time


Time Spent with patient: 15-24 minutes


Medications reviewed and adjusted accordingly: Yes


Anticipated discharge: SNF - Short term rehab


Within: when bed available

## 2019-01-18 LAB
ERYTHROCYTE [DISTWIDTH] IN BLOOD BY AUTOMATED COUNT: 15.9 % (ref 11.5–14)
HCT VFR BLD CALC: 30.8 % (ref 36–47)
HGB BLD-MCNC: 10.4 G/DL (ref 12–15.5)
MCH RBC QN AUTO: 29.6 PG (ref 27–33.4)
MCHC RBC AUTO-ENTMCNC: 33.9 G/DL (ref 32–36)
MCV RBC AUTO: 88 FL (ref 80–97)
PLATELET # BLD: 363 10^3/UL (ref 150–450)
RBC # BLD AUTO: 3.53 10^6/UL (ref 3.72–5.28)
WBC # BLD AUTO: 15.4 10^3/UL (ref 4–10.5)

## 2019-01-18 RX ADMIN — MESALAMINE SCH MG: 400 CAPSULE, DELAYED RELEASE ORAL at 10:26

## 2019-01-18 RX ADMIN — MERCAPTOPURINE SCH MG: 50 TABLET ORAL at 10:27

## 2019-01-18 RX ADMIN — SODIUM CHLORIDE PRN MLS/HR: 0.45 INJECTION, SOLUTION INTRAVENOUS at 12:11

## 2019-01-18 RX ADMIN — AMPICILLIN SCH MLS/HR: 2 INJECTION, POWDER, FOR SOLUTION INTRAVENOUS at 00:58

## 2019-01-18 RX ADMIN — METHYLPREDNISOLONE SODIUM SUCCINATE SCH MG: 40 INJECTION, POWDER, FOR SOLUTION INTRAMUSCULAR; INTRAVENOUS at 10:26

## 2019-01-18 RX ADMIN — METHYLPREDNISOLONE SODIUM SUCCINATE SCH MG: 40 INJECTION, POWDER, FOR SOLUTION INTRAMUSCULAR; INTRAVENOUS at 03:07

## 2019-01-18 RX ADMIN — METHYLPREDNISOLONE SODIUM SUCCINATE SCH MG: 40 INJECTION, POWDER, FOR SOLUTION INTRAMUSCULAR; INTRAVENOUS at 18:23

## 2019-01-18 RX ADMIN — IMIPENEM AND CILASTATIN SODIUM SCH MLS/HR: 500; 500 INJECTION, POWDER, FOR SOLUTION INTRAVENOUS at 03:07

## 2019-01-18 RX ADMIN — HEPARIN SODIUM SCH UNIT: 5000 INJECTION, SOLUTION INTRAVENOUS; SUBCUTANEOUS at 10:26

## 2019-01-18 RX ADMIN — MESALAMINE SCH MG: 400 CAPSULE, DELAYED RELEASE ORAL at 18:23

## 2019-01-18 RX ADMIN — MESALAMINE SCH MG: 400 CAPSULE, DELAYED RELEASE ORAL at 14:00

## 2019-01-18 RX ADMIN — AMPICILLIN SCH MLS/HR: 2 INJECTION, POWDER, FOR SOLUTION INTRAVENOUS at 05:22

## 2019-01-18 RX ADMIN — FLUOXETINE SCH MG: 20 CAPSULE ORAL at 05:22

## 2019-01-18 RX ADMIN — AMPICILLIN SCH MLS/HR: 2 INJECTION, POWDER, FOR SOLUTION INTRAVENOUS at 18:24

## 2019-01-18 RX ADMIN — AMPICILLIN SCH MLS/HR: 2 INJECTION, POWDER, FOR SOLUTION INTRAVENOUS at 12:09

## 2019-01-18 RX ADMIN — HEPARIN SODIUM SCH UNIT: 5000 INJECTION, SOLUTION INTRAVENOUS; SUBCUTANEOUS at 22:30

## 2019-01-18 RX ADMIN — IMIPENEM AND CILASTATIN SODIUM SCH MLS/HR: 500; 500 INJECTION, POWDER, FOR SOLUTION INTRAVENOUS at 08:17

## 2019-01-18 NOTE — PDOC PROGRESS REPORT
<BRY ISIDRO - Last Filed: 01/18/19 15:21>





Subjective


Progress Note for:: 01/18/19


Subjective:: 


The patient is a 58-year-old, chronically ill-appearing, female with past 

medical history of ulcerative colitis, psoriatic arthritic, depression, and 

obesity who was admitted 1/9/19 for ulcerative colitis.





Patient was seen on morning rounds.  She was found resting in bed comfortably on

room air.  She denies abdominal pain.  She reports a good appetite.  She is 

tearful today; reports she nearly fell this morning while pivoting to commode.  

She is frightened by her near-falls and worries about her ability to improve.  

She is further frustrated by the delay in transfer to SNF; bed offer at [facility] 

pending insurance approval.


She denies fever, chills, body aches, chest pain, palpitations, dyspnea, 

abdominal pain, nausea, vomiting, and diarrhea


She has no other questions or concerns.





No concerns per nursing.


Reason For Visit: 


ULCERATIVE COLITIS








Physical Exam


Vital Signs: 


                                        











Temp Pulse Resp BP Pulse Ox


 


 98.0 F   91   18   135/69 H  100 


 


 01/18/19 08:00  01/18/19 08:00  01/18/19 08:00  01/18/19 08:00  01/18/19 08:00








                                 Intake & Output











 01/17/19 01/18/19 01/19/19





 06:59 06:59 06:59


 


Intake Total 2080 800 400


 


Output Total 27 2 


 


Balance 2053 798 400


 


Weight 84.096 kg 83 kg 











General appearance: PRESENT: no acute distress, obese, well-developed, well-

nourished


Head exam: PRESENT: atraumatic, normocephalic


Eye exam: PRESENT: conjunctiva pink, EOMI, PERRLA.  ABSENT: scleral icterus


Ear exam: PRESENT: normal external ear exam


Mouth exam: PRESENT: moist, tongue midline


Neck exam: ABSENT: carotid bruit, JVD, lymphadenopathy, thyromegaly


Respiratory exam: PRESENT: clear to auscultation umu.  ABSENT: rales, rhonchi, 

wheezes


Cardiovascular exam: PRESENT: RRR.  ABSENT: diastolic murmur, rubs, systolic 

murmur


Pulses: PRESENT: normal dorsalis pedis pul


Vascular exam: PRESENT: normal capillary refill


GI/Abdominal exam: PRESENT: normal bowel sounds, soft, other - DAY drain.  

ABSENT: distended, guarding, mass, organolmegaly, rebound, tenderness


Rectal exam: PRESENT: deferred


Extremities exam: PRESENT: full ROM, +1 edema - nonpitting BLE edema.  ABSENT: 

calf tenderness, clubbing, pedal edema


Neurological exam: PRESENT: alert, awake, oriented to person, oriented to place,

oriented to time, oriented to situation, CN II-XII grossly intact.  ABSENT: 

motor sensory deficit


Psychiatric exam: PRESENT: anxious - tearful, appropriate affect.  ABSENT: 

homicidal ideation, suicidal ideation


Skin exam: PRESENT: dry, intact, warm.  ABSENT: cyanosis, rash





Results


Laboratory Results: 


                                        





                                 01/18/19 08:50 





                                 01/16/19 10:29 





                                        











  01/18/19





  08:50


 


WBC  15.4 H


 


RBC  3.53 L


 


Hgb  10.4 L


 


Hct  30.8 L


 


MCV  88


 


MCH  29.6


 


MCHC  33.9


 


RDW  15.9 H


 


Plt Count  363











Impressions: 


                                        





Abdomen/Pelvis CT  01/09/19 15:01


IMPRESSION:  1.  Sigmoid diverticulosis.  Cannot exclude mild associated 

inflammation.


2.  There is a large irregularly-shaped fluid collection in the lower abdomen/ 

upper pelvis as described.  This is concerning for an abscess.  The exact origin

is not clear.  Is there clinical evidence of or history of appendicitis?


 








Percutaneous Drainage  01/10/19 00:00


IMPRESSION:  Successful CT-guided drainage of pelvic abscess.


 








Abdomen Ultrasound  01/11/19 00:00


IMPRESSION:


 


No definitive abscess in the region scanned


 


 


copyright 2011 [employer]- All Rights Reserved


 








Guidance Fluoroscopy  01/13/19 08:00


IMPRESSION:  No pneumothorax postprocedure.  Venous access catheter tip at the 

junction of the left subclavian vein and SVC.  No pneumothorax postprocedure.


 








Chest X-Ray  01/13/19 08:45


IMPRESSION:  No pneumothorax postprocedure.  Venous access catheter tip at the 

junction of the left subclavian vein and SVC.  No pneumothorax postprocedure.


 














Assessment & Plan





- Diagnosis


(1) Abscess of abdominal cavity


Is this a current diagnosis for this admission?: Yes   


Plan: 


Now status post percutaneous drain placed by surgery.


Blood cultures have no growth at 5 days.


Pelvic fluid grew E. coli and enterococcus faecialis





Patient will be discharged with DAY drain in place; to follow-up with surgery in 

7-10 days (1/21/19).


Discussed with gastroenterology.  Recommend continuing IV antibiotics through 

1/18/19; last dose this evening.








(2) Crohn's disease


Qualifiers: 


   Gastrointestinal tract location: large intestine   Digestive disease 

complication type: with abscess   Qualified Code(s): K50.114 - Crohn's disease 

of large intestine with abscess   


Is this a current diagnosis for this admission?: Yes   


Plan: 


Primary plan per Dr. Michelle; prednisone taper, mercaptopurine, mesalamine, and 

Lialda per his expertise.


Cultures and antibiotics as above.








(3) Anasarca


Is this a current diagnosis for this admission?: Yes   


Plan: 


Significantly improved; decreased edema to all extremities.


Secondary to hypoalbuminemia; likely multifactorial secondary to poor nutrition 

and poor absorption.





Dietary is consulted; continue high protein diet with dietary supplements.


BRIAN hose and elevation.








(4) Depression


Qualifiers: 


   Depression Type: unspecified   Qualified Code(s): F32.9 - Major depressive 

disorder, single episode, unspecified   


Is this a current diagnosis for this admission?: Yes   


Plan: 


Continue home dose Prozac.








(5) Debility, unspecified


Is this a current diagnosis for this admission?: Yes   


Plan: 


Near-miss today while pivoting to bedside commode.  Patient had to be assisted 

to the floor yesterday evening as well due to sudden worsening of her leg 

weakness. 


Spoke with nursing supervisor about move of patient to the orthopedic floor 

where nursing/Danii would be more confident in assisting with transfers/ambulation

(in addition to improving patient sense of safety); will try to make 

arrangements.


Have paged PT to request continued visits over the weekend; awaiting return 

call.





PT/OT consultations ordered; recommending SNF for short term rehab.


Discharge planning is consulted.








- Time


Time Spent with patient: 15-24 minutes


Medications reviewed and adjusted accordingly: Yes


Anticipated discharge: SNF - Short term rehab


Within: when bed available





<JOVANNI SARABIA - Last Filed: 01/19/19 07:48>





Subjective


Reason For Visit: 


ULCERATIVE COLITIS








Physical Exam


Vital Signs: 


                                        











Temp Pulse Resp BP Pulse Ox


 


 98.7 F   86   18   142/68 H  98 


 


 01/19/19 04:54  01/19/19 04:54  01/19/19 04:54  01/19/19 04:54  01/19/19 04:54








                                 Intake & Output











 01/18/19 01/19/19 01/20/19





 06:59 06:59 06:59


 


Intake Total 800 500 


 


Output Total 2 40 


 


Balance 798 460 


 


Weight 182 lb 15.739 oz 176 lb 5.917 oz 














Results


Laboratory Results: 


                                        





                                 01/18/19 08:50 





                                 01/16/19 10:29 





                                        











  01/18/19





  08:50


 


WBC  15.4 H


 


RBC  3.53 L


 


Hgb  10.4 L


 


Hct  30.8 L


 


MCV  88


 


MCH  29.6


 


MCHC  33.9


 


RDW  15.9 H


 


Plt Count  363











Impressions: 


                                        





Abdomen/Pelvis CT  01/09/19 15:01


IMPRESSION:  1.  Sigmoid diverticulosis.  Cannot exclude mild associated 

inflammation.


2.  There is a large irregularly-shaped fluid collection in the lower abdomen/ 

upper pelvis as described.  This is concerning for an abscess.  The exact origin

is not clear.  Is there clinical evidence of or history of appendicitis?


 








Percutaneous Drainage  01/10/19 00:00


IMPRESSION:  Successful CT-guided drainage of pelvic abscess.


 








Abdomen Ultrasound  01/11/19 00:00


IMPRESSION:


 


No definitive abscess in the region scanned


 


 


copyright 2011 [employer]- All Rights Reserved


 








Guidance Fluoroscopy  01/13/19 08:00


IMPRESSION:  No pneumothorax postprocedure.  Venous access catheter tip at the 

junction of the left subclavian vein and SVC.  No pneumothorax postprocedure.


 








Chest X-Ray  01/13/19 08:45


IMPRESSION:  No pneumothorax postprocedure.  Venous access catheter tip at the 

junction of the left subclavian vein and SVC.  No pneumothorax postprocedure.


 














Assessment & Plan





- Plan Summary


Plan Summary: 





i did not see patient myself.  i agree with plan with [facility] as it was 

presented to me.


Jovanni Sarabia MD

## 2019-01-19 LAB
APPEARANCE UR: CLEAR
APTT PPP: YELLOW S
BILIRUB UR QL STRIP: NEGATIVE
GLUCOSE UR STRIP-MCNC: NEGATIVE MG/DL
KETONES UR STRIP-MCNC: NEGATIVE MG/DL
NITRITE UR QL STRIP: NEGATIVE
PH UR STRIP: 8 [PH] (ref 5–9)
PROT UR STRIP-MCNC: NEGATIVE MG/DL
SP GR UR STRIP: 1.01
UROBILINOGEN UR-MCNC: NEGATIVE MG/DL (ref ?–2)

## 2019-01-19 RX ADMIN — METHYLPREDNISOLONE SODIUM SUCCINATE SCH MG: 40 INJECTION, POWDER, FOR SOLUTION INTRAMUSCULAR; INTRAVENOUS at 17:32

## 2019-01-19 RX ADMIN — MESALAMINE SCH MG: 400 CAPSULE, DELAYED RELEASE ORAL at 14:58

## 2019-01-19 RX ADMIN — MESALAMINE SCH MG: 400 CAPSULE, DELAYED RELEASE ORAL at 17:33

## 2019-01-19 RX ADMIN — MESALAMINE SCH MG: 400 CAPSULE, DELAYED RELEASE ORAL at 10:42

## 2019-01-19 RX ADMIN — MERCAPTOPURINE SCH MG: 50 TABLET ORAL at 10:43

## 2019-01-19 RX ADMIN — HEPARIN SODIUM SCH UNIT: 5000 INJECTION, SOLUTION INTRAVENOUS; SUBCUTANEOUS at 22:28

## 2019-01-19 RX ADMIN — FLUOXETINE SCH MG: 20 CAPSULE ORAL at 06:41

## 2019-01-19 RX ADMIN — METHYLPREDNISOLONE SODIUM SUCCINATE SCH MG: 40 INJECTION, POWDER, FOR SOLUTION INTRAMUSCULAR; INTRAVENOUS at 10:44

## 2019-01-19 RX ADMIN — HEPARIN SODIUM SCH UNIT: 5000 INJECTION, SOLUTION INTRAVENOUS; SUBCUTANEOUS at 10:43

## 2019-01-19 RX ADMIN — METHYLPREDNISOLONE SODIUM SUCCINATE SCH: 40 INJECTION, POWDER, FOR SOLUTION INTRAMUSCULAR; INTRAVENOUS at 08:30

## 2019-01-19 NOTE — PDOC PROGRESS REPORT
<BRY ISIDRO - Last Filed: 01/19/19 15:57>





Subjective


Progress Note for:: 01/19/19


Subjective:: 


The patient is a 58-year-old, chronically ill-appearing, female with past 

medical history of ulcerative colitis, psoriatic arthritic, depression, and 

obesity who was admitted 1/9/19 for ulcerative colitis.





Patient was seen on morning rounds.  She was found resting in bed comfortably on

room air.  She denies abdominal pain.  She reports a good appetite.  She does 

report an increase in frequency of urinartion but denies dysuria and urgency.


She denies fever, chills, body aches, chest pain, palpitations, dyspnea, 

abdominal pain, nausea, vomiting, and diarrhea


She has no other questions or concerns.





No concerns per nursing; discussed the importance of frequent position changes 

and continued attempts at stand and pivot.  Have left a message with physical 

therapy department to see the patient tomorrow with nursing at bedside so that 

safety can be discussed and demonstrated amongst the staff members and patient.


Reason For Visit: 


ULCERATIVE COLITIS








Physical Exam


Vital Signs: 


                                        











Temp Pulse Resp BP Pulse Ox


 


 98.4 F   98   18   137/74 H  100 


 


 01/19/19 07:58  01/19/19 07:58  01/19/19 07:58  01/19/19 07:58  01/19/19 07:58








                                 Intake & Output











 01/18/19 01/19/19 01/20/19





 06:59 06:59 06:59


 


Intake Total 800 500 


 


Output Total 2 40 


 


Balance 798 460 


 


Weight 83 kg 80 kg 











General appearance: PRESENT: no acute distress, obese, well-developed, 

well-nourished


Head exam: PRESENT: atraumatic, normocephalic


Eye exam: PRESENT: conjunctiva pink, EOMI, PERRLA.  ABSENT: scleral icterus


Ear exam: PRESENT: normal external ear exam


Mouth exam: PRESENT: moist, tongue midline


Neck exam: ABSENT: carotid bruit, JVD, lymphadenopathy, thyromegaly


Respiratory exam: PRESENT: clear to auscultation umu, symmetrical.  ABSENT: 

rales, rhonchi, wheezes


Cardiovascular exam: PRESENT: RRR, +S1, +S2.  ABSENT: diastolic murmur, rubs, 

systolic murmur


Pulses: PRESENT: normal dorsalis pedis pul


Vascular exam: PRESENT: normal capillary refill


GI/Abdominal exam: PRESENT: normal bowel sounds, soft, other - DAY drain.  

ABSENT: distended, guarding, mass, organolmegaly, rebound, tenderness


Rectal exam: PRESENT: deferred


Extremities exam: PRESENT: full ROM.  ABSENT: calf tenderness, clubbing, pedal 

edema


Neurological exam: PRESENT: alert, awake, oriented to person, oriented to place,

oriented to time, oriented to situation, CN II-XII grossly intact.  ABSENT: 

motor sensory deficit


Psychiatric exam: PRESENT: appropriate affect, normal mood.  ABSENT: homicidal 

ideation, suicidal ideation


Skin exam: PRESENT: dry, intact, warm.  ABSENT: cyanosis, rash





Results


Laboratory Results: 


                                        





                                 01/18/19 08:50 





                                 01/16/19 10:29 





                                        











  01/19/19





  14:15


 


Urine Color  YELLOW


 


Urine Appearance  CLEAR


 


Urine pH  8.0


 


Ur Specific Gravity  1.006


 


Urine Protein  NEGATIVE


 


Urine Glucose (UA)  NEGATIVE


 


Urine Ketones  NEGATIVE


 


Urine Blood  NEGATIVE


 


Urine Nitrite  NEGATIVE


 


Ur Leukocyte Esterase  SMALL H


 


Urine WBC (Auto)  9


 


Urine RBC (Auto)  2











Impressions: 


                                        





Abdomen/Pelvis CT  01/09/19 15:01


IMPRESSION:  1.  Sigmoid diverticulosis.  Cannot exclude mild associated 

inflammation.


2.  There is a large irregularly-shaped fluid collection in the lower abdomen/ 

upper pelvis as described.  This is concerning for an abscess.  The exact origin

is not clear.  Is there clinical evidence of or history of appendicitis?


 








Percutaneous Drainage  01/10/19 00:00


IMPRESSION:  Successful CT-guided drainage of pelvic abscess.


 








Abdomen Ultrasound  01/11/19 00:00


IMPRESSION:


 


No definitive abscess in the region scanned


 


 


copyright 2011 [employer]- All Rights Reserved


 








Guidance Fluoroscopy  01/13/19 08:00


IMPRESSION:  No pneumothorax postprocedure.  Venous access catheter tip at the 

junction of the left subclavian vein and SVC.  No pneumothorax postprocedure.


 








Chest X-Ray  01/13/19 08:45


IMPRESSION:  No pneumothorax postprocedure.  Venous access catheter tip at the 

junction of the left subclavian vein and SVC.  No pneumothorax postprocedure.


 














Assessment & Plan





- Diagnosis


(1) Abscess of abdominal cavity


Is this a current diagnosis for this admission?: Yes   


Plan: 


Now status post percutaneous drain placed by surgery.


Blood cultures have no growth at 5 days.


Pelvic fluid grew E. coli and enterococcus faecialis





Patient will be discharged with DAY drain in place; to follow-up with surgery in 

7-10 days (1/21/19).


Discussed with gastroenterology.  Antibiotics discontinued yesterday.








(2) Crohn's disease


Qualifiers: 


   Gastrointestinal tract location: large intestine   Digestive disease 

complication type: with abscess   Qualified Code(s): K50.114 - Crohn's disease 

of large intestine with abscess   


Is this a current diagnosis for this admission?: Yes   


Plan: 


Primary plan per Dr. Michelle; prednisone taper, mercaptopurine, mesalamine, and 

Lialda per his expertise.


Cultures and antibiotics as above.








(3) Anasarca


Is this a current diagnosis for this admission?: Yes   


Plan: 


Significantly improved; decreased edema to all extremities.


Secondary to hypoalbuminemia; likely multifactorial secondary to poor nutrition 

and poor absorption.





Dietary is consulted; continue high protein diet with dietary supplements.


BRIAN hose and elevation.








(4) Depression


Qualifiers: 


   Depression Type: unspecified   Qualified Code(s): F32.9 - Major depressive 

disorder, single episode, unspecified   


Is this a current diagnosis for this admission?: Yes   


Plan: 


Continue home dose Prozac.








(5) Debility, unspecified


Is this a current diagnosis for this admission?: Yes   


Plan: 


Near-miss yesterday while pivoting to bedside commode.  Patient has had to be 

assisted to the floor yon three occasions due to sudden worsening of her leg 

weakness. 


Spoke with nursing supervisor about move of patient to the orthopedic floor 

where nursing/Danii would be more confident in assisting with transfers/ambulation

(in addition to improving patient sense of safety); will try to make 

arrangements.


Have paged PT to request continued visits over the weekend.





PT/OT consultations ordered; recommending SNF for short term rehab.


Discharge planning is consulted.








(6) Urinary frequency


Is this a current diagnosis for this admission?: Yes   


Plan: 


Urinalysis is negative for UTI.








- Time


Time Spent with patient: 15-24 minutes


Medications reviewed and adjusted accordingly: Yes


Anticipated discharge: SNF


Within: when bed available





<JOVANNI SARABIA - Last Filed: 01/20/19 17:56>





Subjective


Reason For Visit: 


ULCERATIVE COLITIS








Physical Exam


Vital Signs: 


                                        











Temp Pulse Resp BP Pulse Ox


 


 98.3 F   101 H  18   132/65 H  100 


 


 01/20/19 07:54  01/20/19 07:54  01/20/19 07:54  01/20/19 07:54  01/20/19 07:54








                                 Intake & Output











 01/19/19 01/20/19 01/21/19





 06:59 06:59 06:59


 


Intake Total 500  240


 


Output Total 40  


 


Balance 460  240


 


Weight 176 lb 5.917 oz  














Results


Laboratory Results: 


                                        





                                 01/18/19 08:50 





                                 01/16/19 10:29 








Impressions: 


                                        





Abdomen/Pelvis CT  01/09/19 15:01


IMPRESSION:  1.  Sigmoid diverticulosis.  Cannot exclude mild associated inf

lammation.


2.  There is a large irregularly-shaped fluid collection in the lower abdomen/ 

upper pelvis as described.  This is concerning for an abscess.  The exact origin

is not clear.  Is there clinical evidence of or history of appendicitis?


 








Percutaneous Drainage  01/10/19 00:00


IMPRESSION:  Successful CT-guided drainage of pelvic abscess.


 








Abdomen Ultrasound  01/11/19 00:00


IMPRESSION:


 


No definitive abscess in the region scanned


 


 


copyright 2011 [employer]- All Rights Reserved


 








Guidance Fluoroscopy  01/13/19 08:00


IMPRESSION:  No pneumothorax postprocedure.  Venous access catheter tip at the 

junction of the left subclavian vein and SVC.  No pneumothorax postprocedure.


 








Chest X-Ray  01/13/19 08:45


IMPRESSION:  No pneumothorax postprocedure.  Venous access catheter tip at the 

junction of the left subclavian vein and SVC.  No pneumothorax postprocedure.


 














Assessment & Plan





- Plan Summary


Plan Summary: 





i did not see patient myself- i agree with plan as it was presented to me

## 2019-01-20 RX ADMIN — METHYLPREDNISOLONE SODIUM SUCCINATE SCH MG: 40 INJECTION, POWDER, FOR SOLUTION INTRAMUSCULAR; INTRAVENOUS at 09:29

## 2019-01-20 RX ADMIN — HEPARIN SODIUM SCH UNIT: 5000 INJECTION, SOLUTION INTRAVENOUS; SUBCUTANEOUS at 22:31

## 2019-01-20 RX ADMIN — HEPARIN SODIUM SCH UNIT: 5000 INJECTION, SOLUTION INTRAVENOUS; SUBCUTANEOUS at 09:29

## 2019-01-20 RX ADMIN — FLUOXETINE SCH MG: 20 CAPSULE ORAL at 06:07

## 2019-01-20 RX ADMIN — METHYLPREDNISOLONE SODIUM SUCCINATE SCH MG: 40 INJECTION, POWDER, FOR SOLUTION INTRAMUSCULAR; INTRAVENOUS at 17:13

## 2019-01-20 RX ADMIN — MESALAMINE SCH MG: 400 CAPSULE, DELAYED RELEASE ORAL at 13:39

## 2019-01-20 RX ADMIN — MESALAMINE SCH MG: 400 CAPSULE, DELAYED RELEASE ORAL at 17:13

## 2019-01-20 RX ADMIN — MERCAPTOPURINE SCH MG: 50 TABLET ORAL at 09:28

## 2019-01-20 RX ADMIN — METHYLPREDNISOLONE SODIUM SUCCINATE SCH MG: 40 INJECTION, POWDER, FOR SOLUTION INTRAMUSCULAR; INTRAVENOUS at 02:33

## 2019-01-20 RX ADMIN — MESALAMINE SCH MG: 400 CAPSULE, DELAYED RELEASE ORAL at 09:28

## 2019-01-20 NOTE — PDOC PROGRESS REPORT
<BRY ISIDRO - Last Filed: 01/20/19 10:44>





Subjective


Progress Note for:: 01/20/19


Subjective:: 


The patient is a 58-year-old, chronically ill-appearing, female with past 

medical history of ulcerative colitis, psoriatic arthritic, depression, and 

obesity who was admitted 1/9/19 for ulcerative colitis.





Patient was seen on morning rounds.  She was found resting comfortably on room 

air in the recliner.  She reports that she was ambulatory in the hallway 

yesterday and that her Rt leg weakness seems to be most severe when 

transitioning from sitting to standing.


She denies fever, chills, body aches, chest pain, palpitations, dyspnea, 

abdominal pain, nausea, vomiting, and diarrhea.  She denies further urinary 

frequency, urgency.  No dysuria.


She has no other questions or concerns.





No concerns per nursing.


Reason For Visit: 


ULCERATIVE COLITIS








Physical Exam


Vital Signs: 


                                        











Temp Pulse Resp BP Pulse Ox


 


 98.3 F   101 H  18   132/65 H  100 


 


 01/20/19 07:54  01/20/19 07:54  01/20/19 07:54  01/20/19 07:54  01/20/19 07:54








                                 Intake & Output











 01/19/19 01/20/19 01/21/19





 06:59 06:59 06:59


 


Intake Total 500  240


 


Output Total 40  


 


Balance 460  240


 


Weight 80 kg  











General appearance: PRESENT: no acute distress, cooperative, obese, well-

developed, well-nourished


Head exam: PRESENT: atraumatic, normocephalic


Eye exam: PRESENT: conjunctiva pink, EOMI, PERRLA.  ABSENT: scleral icterus


Ear exam: PRESENT: normal external ear exam


Mouth exam: PRESENT: moist, tongue midline


Neck exam: ABSENT: carotid bruit, JVD, lymphadenopathy, thyromegaly


Respiratory exam: PRESENT: clear to auscultation umu, symmetrical, unlabored.  

ABSENT: rales, rhonchi, wheezes


Cardiovascular exam: PRESENT: RRR.  ABSENT: diastolic murmur, rubs, systolic 

murmur


Pulses: PRESENT: normal dorsalis pedis pul


Vascular exam: PRESENT: normal capillary refill


GI/Abdominal exam: PRESENT: normal bowel sounds, soft, other - DAY drain.  ABSENT

: distended, guarding, mass, organolmegaly, rebound, tenderness


Rectal exam: PRESENT: deferred


Extremities exam: PRESENT: full ROM, pedal edema.  ABSENT: calf tenderness, 

clubbing


Neurological exam: PRESENT: alert, awake, oriented to person, oriented to place,

oriented to time, oriented to situation, CN II-XII grossly intact.  ABSENT: 

motor sensory deficit


Psychiatric exam: PRESENT: appropriate affect, normal mood.  ABSENT: homicidal 

ideation, suicidal ideation


Skin exam: PRESENT: dry, intact, warm.  ABSENT: cyanosis, rash





Results


Laboratory Results: 


                                        





                                 01/18/19 08:50 





                                 01/16/19 10:29 





                                        











  01/19/19





  14:15


 


Urine Color  YELLOW


 


Urine Appearance  CLEAR


 


Urine pH  8.0


 


Ur Specific Gravity  1.006


 


Urine Protein  NEGATIVE


 


Urine Glucose (UA)  NEGATIVE


 


Urine Ketones  NEGATIVE


 


Urine Blood  NEGATIVE


 


Urine Nitrite  NEGATIVE


 


Ur Leukocyte Esterase  SMALL H


 


Urine WBC (Auto)  9


 


Urine RBC (Auto)  2











Impressions: 


                                        





Abdomen/Pelvis CT  01/09/19 15:01


IMPRESSION:  1.  Sigmoid diverticulosis.  Cannot exclude mild associated 

inflammation.


2.  There is a large irregularly-shaped fluid collection in the lower abdomen/ 

upper pelvis as described.  This is concerning for an abscess.  The exact origin

is not clear.  Is there clinical evidence of or history of appendicitis?


 








Percutaneous Drainage  01/10/19 00:00


IMPRESSION:  Successful CT-guided drainage of pelvic abscess.


 








Abdomen Ultrasound  01/11/19 00:00


IMPRESSION:


 


No definitive abscess in the region scanned


 


 


copyright 2011 [employer]- All Rights Reserved


 








Guidance Fluoroscopy  01/13/19 08:00


IMPRESSION:  No pneumothorax postprocedure.  Venous access catheter tip at the 

junction of the left subclavian vein and SVC.  No pneumothorax postprocedure.


 








Chest X-Ray  01/13/19 08:45


IMPRESSION:  No pneumothorax postprocedure.  Venous access catheter tip at the 

junction of the left subclavian vein and SVC.  No pneumothorax postprocedure.


 














Assessment & Plan





- Diagnosis


(1) Abscess of abdominal cavity


Is this a current diagnosis for this admission?: Yes   


Plan: 


Now status post percutaneous drain placed by surgery.


Blood cultures have no growth at 5 days.


Pelvic fluid grew E. coli and enterococcus faecialis





Patient will be discharged with DAY drain in place; to follow-up with surgery in 

7-10 days (1/21/19).


Discussed with gastroenterology.  She is completed a course of antibiotics.








(2) Crohn's disease


Qualifiers: 


   Gastrointestinal tract location: large intestine   Digestive disease 

complication type: with abscess   Qualified Code(s): K50.114 - Crohn's disease 

of large intestine with abscess   


Is this a current diagnosis for this admission?: Yes   


Plan: 


Primary plan per Dr. Michelle; prednisone taper, mercaptopurine, mesalamine, and 

Lialda per his expertise.


Cultures and antibiotics as above.








(3) Anasarca


Is this a current diagnosis for this admission?: Yes   


Plan: 


Significantly improved; decreased edema to all extremities.


Secondary to hypoalbuminemia; likely multifactorial secondary to poor nutrition 

and poor absorption.





Dietary is consulted; continue high protein diet with dietary supplements.


BRIAN hose and elevation.








(4) Depression


Qualifiers: 


   Depression Type: unspecified   Qualified Code(s): F32.9 - Major depressive 

disorder, single episode, unspecified   


Is this a current diagnosis for this admission?: Yes   


Plan: 


Continue home dose Prozac.








(5) Debility, unspecified


Is this a current diagnosis for this admission?: Yes   


Plan: 


The patient has had 3 near misses where she had to be assisted to the floor to 

prevent a fall due to sudden worsening of leg weakness; right greater than left.

 


Spoke with nursing supervisor about move of patient to the orthopedic floor 

where nursing/Danii would be more confident in assisting with transfers/ambulation

(in addition to improving patient sense of safety); will try to make 

arrangements.





Have requested PT to continue visits over the weekend.


PT/OT consultations ordered; recommending SNF for short term rehab.


Discharge planning is consulted.








(6) Urinary frequency


Is this a current diagnosis for this admission?: Yes   


Plan: 


Symptoms have resolved.


Urinalysis is negative for UTI.








- Time


Time Spent with patient: Less than 15 minutes


Medications reviewed and adjusted accordingly: Yes


Anticipated discharge: SNF


Within: when bed available





<JOVANNI SARABIA - Last Filed: 01/20/19 16:20>





Subjective


Reason For Visit: 


ULCERATIVE COLITIS








Physical Exam


Vital Signs: 


                                        











Temp Pulse Resp BP Pulse Ox


 


 98.3 F   101 H  18   132/65 H  100 


 


 01/20/19 07:54  01/20/19 07:54  01/20/19 07:54  01/20/19 07:54  01/20/19 07:54








                                 Intake & Output











 01/19/19 01/20/19 01/21/19





 06:59 06:59 06:59


 


Intake Total 500  240


 


Output Total 40  


 


Balance 460  240


 


Weight 176 lb 5.917 oz  














Results


Laboratory Results: 


                                        





                                 01/18/19 08:50 





                                 01/16/19 10:29 








Impressions: 


                                        





Abdomen/Pelvis CT  01/09/19 15:01


IMPRESSION:  1.  Sigmoid diverticulosis.  Cannot exclude mild associated 

inflammation.


2.  There is a large irregularly-shaped fluid collection in the lower abdomen/ 

upper pelvis as described.  This is concerning for an abscess.  The exact origin

is not clear.  Is there clinical evidence of or history of appendicitis?


 








Percutaneous Drainage  01/10/19 00:00


IMPRESSION:  Successful CT-guided drainage of pelvic abscess.


 








Abdomen Ultrasound  01/11/19 00:00


IMPRESSION:


 


No definitive abscess in the region scanned


 


 


copyright 2011 [employer]- All Rights Reserved


 








Guidance Fluoroscopy  01/13/19 08:00


IMPRESSION:  No pneumothorax postprocedure.  Venous access catheter tip at the 

junction of the left subclavian vein and SVC.  No pneumothorax postprocedure.


 








Chest X-Ray  01/13/19 08:45


IMPRESSION:  No pneumothorax postprocedure.  Venous access catheter tip at the 

junction of the left subclavian vein and SVC.  No pneumothorax postprocedure.


 














Assessment & Plan





- Plan Summary


Plan Summary: 





i did not see patient.  i agree with the plan as it was presented to me

## 2019-01-21 LAB
INR PPP: 0.81
PROTHROMBIN TIME: 11.6 SEC (ref 11.4–15.4)

## 2019-01-21 RX ADMIN — FLUOXETINE SCH MG: 20 CAPSULE ORAL at 05:55

## 2019-01-21 RX ADMIN — MERCAPTOPURINE SCH MG: 50 TABLET ORAL at 11:54

## 2019-01-21 RX ADMIN — MESALAMINE SCH MG: 400 CAPSULE, DELAYED RELEASE ORAL at 18:07

## 2019-01-21 RX ADMIN — MESALAMINE SCH MG: 400 CAPSULE, DELAYED RELEASE ORAL at 11:53

## 2019-01-21 RX ADMIN — HEPARIN SODIUM SCH UNIT: 5000 INJECTION, SOLUTION INTRAVENOUS; SUBCUTANEOUS at 11:52

## 2019-01-21 RX ADMIN — HEPARIN SODIUM SCH UNIT: 5000 INJECTION, SOLUTION INTRAVENOUS; SUBCUTANEOUS at 21:20

## 2019-01-21 RX ADMIN — MESALAMINE SCH MG: 400 CAPSULE, DELAYED RELEASE ORAL at 15:00

## 2019-01-21 RX ADMIN — METHYLPREDNISOLONE SODIUM SUCCINATE SCH MG: 40 INJECTION, POWDER, FOR SOLUTION INTRAMUSCULAR; INTRAVENOUS at 11:54

## 2019-01-21 RX ADMIN — METHYLPREDNISOLONE SODIUM SUCCINATE SCH MG: 40 INJECTION, POWDER, FOR SOLUTION INTRAMUSCULAR; INTRAVENOUS at 18:07

## 2019-01-21 RX ADMIN — METHYLPREDNISOLONE SODIUM SUCCINATE SCH MG: 40 INJECTION, POWDER, FOR SOLUTION INTRAMUSCULAR; INTRAVENOUS at 02:54

## 2019-01-21 NOTE — PDOC PROGRESS REPORT
Subjective


Progress Note for:: 01/21/19


Subjective:: 


The patient is a 58-year-old, chronically ill-appearing, female with past 

medical history of ulcerative colitis, psoriatic arthritic, depression, and 

obesity who was admitted 1/9/19 for ulcerative colitis.





Patient was seen on afternoon rounds.  She was found resting comfortably on room

air in bed.  She states she is feeling well today.  We discussed recommendations

for repeat CT and possible removal of her her DAY tube if results are reassuring.

 


She denies fever, chills, body aches, chest pain, palpitations, dyspnea, 

abdominal pain, nausea, vomiting, and diarrhea.  She denies further urinary 

frequency, urgency.  No dysuria.


She has no new questions or concerns.





No concerns per nursing.


Reason For Visit: 


ULCERATIVE COLITIS








Physical Exam


Vital Signs: 


                                        











Temp Pulse Resp BP Pulse Ox


 


 98.4 F   81   16   121/71   99 


 


 01/21/19 15:40  01/21/19 15:40  01/21/19 15:40  01/21/19 15:40  01/21/19 15:40








                                 Intake & Output











 01/20/19 01/21/19 01/22/19





 06:59 06:59 06:59


 


Intake Total  240 


 


Output Total   15


 


Balance  240 -15


 


Weight  77.7 kg 











General appearance: PRESENT: no acute distress, well-developed, well-nourished


Head exam: PRESENT: atraumatic, normocephalic


Eye exam: PRESENT: conjunctiva pink, EOMI, PERRLA.  ABSENT: scleral icterus


Ear exam: PRESENT: normal external ear exam


Mouth exam: PRESENT: moist, tongue midline


Neck exam: ABSENT: carotid bruit, JVD, lymphadenopathy, thyromegaly


Respiratory exam: PRESENT: clear to auscultation umu.  ABSENT: rales, rhonchi, w

heezes


Cardiovascular exam: PRESENT: RRR.  ABSENT: diastolic murmur, rubs, systolic 

murmur


Pulses: PRESENT: normal dorsalis pedis pul


Vascular exam: PRESENT: normal capillary refill


GI/Abdominal exam: PRESENT: normal bowel sounds, soft, other - DAY drain.  

ABSENT: distended, guarding, mass, organolmegaly, rebound, tenderness


Rectal exam: PRESENT: deferred


Extremities exam: PRESENT: full ROM.  ABSENT: calf tenderness, clubbing, pedal 

edema


Neurological exam: PRESENT: alert, awake, oriented to person, oriented to place,

oriented to time, oriented to situation, CN II-XII grossly intact.  ABSENT: 

motor sensory deficit


Psychiatric exam: PRESENT: appropriate affect, normal mood.  ABSENT: homicidal 

ideation, suicidal ideation


Skin exam: PRESENT: dry, intact, warm.  ABSENT: cyanosis, rash





Results


Laboratory Results: 


                                        





                                 01/18/19 08:50 





                                 01/16/19 10:29 








Impressions: 


                                        





Percutaneous Drainage  01/10/19 00:00


IMPRESSION:  Successful CT-guided drainage of pelvic abscess.


 








Abdomen Ultrasound  01/11/19 00:00


IMPRESSION:


 


No definitive abscess in the region scanned


 


 


copyright 2011 [employer]- All Rights Reserved


 








Guidance Fluoroscopy  01/13/19 08:00


IMPRESSION:  No pneumothorax postprocedure.  Venous access catheter tip at the 

junction of the left subclavian vein and SVC.  No pneumothorax postprocedure.


 








Chest X-Ray  01/13/19 08:45


IMPRESSION:  No pneumothorax postprocedure.  Venous access catheter tip at the 

junction of the left subclavian vein and SVC.  No pneumothorax postprocedure.


 








Abdomen/Pelvis CT  01/21/19 15:45


IMPRESSION:  Diverticulosis coli with no acute inflammation.  There is a pigtail

catheter in the anterior pelvis.  The purpose is not certain.  No other findings

in the abdomen or pelvis.


 














Assessment & Plan





- Diagnosis


(1) Abscess of abdominal cavity


Is this a current diagnosis for this admission?: Yes   


Plan: 


Now status post percutaneous drain placed by surgery.


Blood cultures have no growth at 5 days.


Pelvic fluid grew E. coli and enterococcus faecialis


Repeat ABD/Pelvic CT revealed reticulosis with no acute inflammation; pigtail 

catheter in the anterior pelvis.  No other findings.





Patient will be discharged with DAY drain in place; will reconsult surgery in the

morning.


Discussed with gastroenterology.  She is completed a course of antibiotics.








(2) Crohn's disease


Qualifiers: 


   Gastrointestinal tract location: large intestine   Digestive disease 

complication type: with abscess   Qualified Code(s): K50.114 - Crohn's disease 

of large intestine with abscess   


Is this a current diagnosis for this admission?: Yes   


Plan: 


Primary plan per Dr. Michelle; prednisone taper, mercaptopurine, mesalamine, and 

Lialda per his expertise.


Cultures and antibiotics as above.








(3) Anasarca


Is this a current diagnosis for this admission?: Yes   


Plan: 


Significantly improved; decreased edema to all extremities.


Secondary to hypoalbuminemia; likely multifactorial secondary to poor nutrition 

and poor absorption.





Dietary is consulted; continue high protein diet with dietary supplements.


BRIAN hose and ernestina.








(4) Depression


Qualifiers: 


   Depression Type: unspecified   Qualified Code(s): F32.9 - Major depressive 

disorder, single episode, unspecified   


Is this a current diagnosis for this admission?: Yes   


Plan: 


Continue home dose Prozac.








(5) Debility, unspecified


Is this a current diagnosis for this admission?: Yes   


Plan: 


The patient has had 3 near misses where she had to be assisted to the floor to 

prevent a fall due to sudden worsening of leg weakness; right greater than left.

 


Spoke with nursing supervisor about move of patient to the orthopedic floor 

where nursing/Danii would be more confident in assisting with transfers/ambulation

(in addition to improving patient sense of safety); will try to make 

arrangements.





Have requested PT to continue visits over the weekend.


PT/OT consultations ordered; recommending SNF for short term rehab.


Discharge planning is consulted.








(6) Urinary frequency


Is this a current diagnosis for this admission?: Yes   


Plan: 


Symptoms have resolved.


Urinalysis is negative for UTI.








- Time


Time Spent with patient: Less than 15 minutes


Medications reviewed and adjusted accordingly: Yes


Anticipated discharge: SNF


Within: when bed available

## 2019-01-21 NOTE — RADIOLOGY REPORT (SQ)
EXAM DESCRIPTION:  CT ABD/PELVIS NO ORAL OR IV



COMPLETED DATE/TIME:  1/21/2019 5:23 pm



REASON FOR STUDY:  intra-abdominal abscess



COMPARISON:  11/15/2018



TECHNIQUE:  CT scan of the abdomen and pelvis performed without intravenous or oral contrast. Images 
reviewed with lung, soft tissue, and bone windows. Reconstructed coronal and sagittal MPR images revi
ewed. All images stored on PACS.

All CT scanners at this facility use dose modulation, iterative reconstruction, and/or weight based d
osing when appropriate to reduce radiation dose to as low as reasonably achievable (ALARA).

[facility]: Dose Right  [facility]: CareDose    [facility]: Dose Right    [facility]: Teradose 4D    [facility]: Smart Videregen



RADIATION DOSE:  CT Rad equipment meets quality standard of care and radiation dose reduction techniq
ues were employed. CTDIvol: 9.1 mGy. DLP: 502 mGy-cm.mGy.



LIMITATIONS:  None.



FINDINGS:  LOWER CHEST: No significant findings. No nodules or infiltrates.

NON-CONTRASTED LIVER, SPLEEN, ADRENALS: Evaluation limited by lack of IV contrast. No identified sign
ificant masses.

PANCREAS: No masses. No peripancreatic inflammatory changes.

GALLBLADDER: Surgically absent.

RIGHT KIDNEY AND URETER: No suspicious masses. Assessment limited by lack of IV contrast.   No signif
icant calcifications.   No hydronephrosis or hydroureter.

LEFT KIDNEY AND URETER: No suspicious masses. Assessment limited by lack of IV contrast.   No signifi
cant calcifications.   No hydronephrosis or hydroureter.

AORTA AND RETROPERITONEUM: No aneurysm. No retroperitoneal masses or adenopathy.

BOWEL AND PERITONEAL CAVITY: Diverticulosis.  No acute inflammatory changes.

APPENDIX: Not identified.

PELVIS, BLADDER, AND ABDOMINAL WALL:There is a pigtail catheter is seen anteriorly in the pelvis.  Ur
inary bladder is unremarkable.  Uterus is absent.

BONES: No significant findings.

OTHER: No other significant finding.



IMPRESSION:  Diverticulosis coli with no acute inflammation.  There is a pigtail catheter in the ante
rior pelvis.  The purpose is not certain.  No other findings in the abdomen or pelvis.



COMMENT:  Quality ID # [id number]: Final reports with documentation of one or more dose reduction techniques
 (e.g., Automated exposure control, adjustment of the mA and/or kV according to patient size, use of 
iterative reconstruction technique)



TECHNICAL DOCUMENTATION:  JOB ID:  [id number]

 [employer]- All Rights Reserved



Reading location - IP/workstation name: [email or web address]

## 2019-01-22 LAB — TPMT RBC-CCNC: 21.5

## 2019-01-22 RX ADMIN — METHYLPREDNISOLONE SODIUM SUCCINATE SCH MG: 40 INJECTION, POWDER, FOR SOLUTION INTRAMUSCULAR; INTRAVENOUS at 18:43

## 2019-01-22 RX ADMIN — FLUOXETINE SCH MG: 20 CAPSULE ORAL at 06:41

## 2019-01-22 RX ADMIN — HEPARIN SODIUM SCH UNIT: 5000 INJECTION, SOLUTION INTRAVENOUS; SUBCUTANEOUS at 10:09

## 2019-01-22 RX ADMIN — MERCAPTOPURINE SCH MG: 50 TABLET ORAL at 10:13

## 2019-01-22 RX ADMIN — MESALAMINE SCH MG: 400 CAPSULE, DELAYED RELEASE ORAL at 18:46

## 2019-01-22 RX ADMIN — METHYLPREDNISOLONE SODIUM SUCCINATE SCH MG: 40 INJECTION, POWDER, FOR SOLUTION INTRAMUSCULAR; INTRAVENOUS at 10:15

## 2019-01-22 RX ADMIN — MESALAMINE SCH MG: 400 CAPSULE, DELAYED RELEASE ORAL at 14:32

## 2019-01-22 RX ADMIN — HEPARIN SODIUM SCH UNIT: 5000 INJECTION, SOLUTION INTRAVENOUS; SUBCUTANEOUS at 23:09

## 2019-01-22 RX ADMIN — MESALAMINE SCH MG: 400 CAPSULE, DELAYED RELEASE ORAL at 10:12

## 2019-01-22 RX ADMIN — METHYLPREDNISOLONE SODIUM SUCCINATE SCH MG: 40 INJECTION, POWDER, FOR SOLUTION INTRAMUSCULAR; INTRAVENOUS at 02:02

## 2019-01-22 NOTE — PDOC PROGRESS REPORT
Subjective


Progress Note for:: 01/22/19


Subjective:: 





The patient is a 58-year-old, chronically ill-appearing, female with past 

medical history of ulcerative colitis, psoriatic arthritic, depression, and 

obesity who was admitted 1/9/19 for ulcerative colitis.





The patient has had multiple near misses where she had to be assisted to the 

floor to prevent a fall due to sudden worsening of leg weakness, right greater 

than left.  Concern for patient's ability to care for herself upon returning 

home.  Plan to transfer patient to assisted living.  Discharge planning aware.  

Awaiting insurance approval.











Reason For Visit: 


ULCERATIVE COLITIS








Physical Exam


Vital Signs: 


                                        











Temp Pulse Resp BP Pulse Ox


 


 98 F   94   18   137/81 H  97 


 


 01/22/19 15:45  01/22/19 15:45  01/22/19 15:45  01/22/19 15:45  01/22/19 15:45








                                 Intake & Output











 01/21/19 01/22/19 01/23/19





 06:59 06:59 06:59


 


Intake Total 240  


 


Output Total  15 


 


Balance 240 -15 


 


Weight 77.7 kg 75.5 kg 














Results


Laboratory Results: 


                                        





                                 01/18/19 08:50 





                                 01/16/19 10:29 








Impressions: 


                                        





Percutaneous Drainage  01/10/19 00:00


IMPRESSION:  Successful CT-guided drainage of pelvic abscess.


 








Abdomen Ultrasound  01/11/19 00:00


IMPRESSION:


 


No definitive abscess in the region scanned


 


 


copyright 2011 [employer]- All Rights Reserved


 








Guidance Fluoroscopy  01/13/19 08:00


IMPRESSION:  No pneumothorax postprocedure.  Venous access catheter tip at the 

junction of the left subclavian vein and SVC.  No pneumothorax postprocedure.


 








Chest X-Ray  01/13/19 08:45


IMPRESSION:  No pneumothorax postprocedure.  Venous access catheter tip at the 

junction of the left subclavian vein and SVC.  No pneumothorax postprocedure.


 








Abdomen/Pelvis CT  01/21/19 15:45


IMPRESSION:  Diverticulosis coli with no acute inflammation.  There is a pigtail

catheter in the anterior pelvis.  The purpose is not certain.  No other findings

in the abdomen or pelvis.


 














Assessment & Plan





- Diagnosis


(1) Abscess of abdominal cavity


Is this a current diagnosis for this admission?: Yes   


Plan: 





Now status post percutaneous drain placed by surgery.


Blood cultures have no growth at 5 days.


Pelvic fluid grew E. coli and enterococcus faecialis


Repeat ABD/Pelvic CT revealed reticulosis with no acute inflammation; pigtail 

catheter in the anterior pelvis.  No other findings.





Patient will be discharged with DAY drain in place; WILL NEED TO F/U WITH SURGERY

AS AN OUTPATIENT


Discussed with gastroenterology.  


Patient has completed a course of antibiotics.














(2) Anasarca


Is this a current diagnosis for this admission?: Yes   


Plan: 





Improved.


Secondary to hypoalbuminemia; likely multifactorial secondary to poor nutrition 

and poor absorption.





Dietary is consulted; continue high protein diet with dietary supplements.


BRIAN zaratee and ernestina.














(3) Crohn's disease


Qualifiers: 


   Gastrointestinal tract location: large intestine   Digestive disease 

complication type: with abscess   Qualified Code(s): K50.114 - Crohn's disease 

of large intestine with abscess   


Is this a current diagnosis for this admission?: Yes   


Plan: 





Primary plan per Dr. Michelle; prednisone taper, mercaptopurine, mesalamine, and 

Lialda per his expertise.


Cultures and antibiotics as above.











(4) Debility, unspecified


Is this a current diagnosis for this admission?: Yes   


Plan: 





The patient has had 3 near misses where she had to be assisted to the floor to 

prevent a fall due to sudden worsening of leg weakness; right greater than left.

 


Spoke with nursing supervisor about move of patient to the orthopedic floor 

where nursing/Danii would be more confident in assisting with transfers/ambulation

(in addition to improving patient sense of safety); will try to make 

arrangements.





Have requested PT to continue visits over the weekend.


PT/OT consultations ordered; recommending SNF for short term rehab.


Discharge planning is consulted.


Awaiting insurance approval for transfer to SNF











(5) Depression


Qualifiers: 


   Depression Type: unspecified   Qualified Code(s): F32.9 - Major depressive 

disorder, single episode, unspecified   


Is this a current diagnosis for this admission?: Yes   


Plan: 





Continue home dose Prozac.














- Time


Time Spent with patient: 15-24 minutes


Smoking Cessation Education: 3 to 10 minutes


Medications reviewed and adjusted accordingly: Yes


Anticipated discharge: SNF


Within: within 48 hours





- Inpatient Certification


Based on my medical assessment, after consideration of the patient's 

comorbidities, presenting symptoms, or acuity I expect that the services needed 

warrant INPATIENT care.: Yes


I certify that my determination is in accordance with my understanding of 

Medicare's requirements for reasonable and necessary INPATIENT services [42 CFR 

412.3e].: Yes


Medical Necessity: Need for IV Antibiotics

## 2019-01-23 LAB
ALBUMIN SERPL-MCNC: 3.1 G/DL (ref 3.5–5)
ALP SERPL-CCNC: 231 U/L (ref 38–126)
ALT SERPL-CCNC: 35 U/L (ref 9–52)
ANION GAP SERPL CALC-SCNC: 5 MMOL/L (ref 5–19)
AST SERPL-CCNC: 22 U/L (ref 14–36)
BILIRUB DIRECT SERPL-MCNC: 0.6 MG/DL (ref 0–0.4)
BILIRUB SERPL-MCNC: 2.1 MG/DL (ref 0.2–1.3)
BUN SERPL-MCNC: 12 MG/DL (ref 7–20)
CALCIUM: 8.3 MG/DL (ref 8.4–10.2)
CHLORIDE SERPL-SCNC: 94 MMOL/L (ref 98–107)
CO2 SERPL-SCNC: 34 MMOL/L (ref 22–30)
ERYTHROCYTE [DISTWIDTH] IN BLOOD BY AUTOMATED COUNT: 17.5 % (ref 11.5–14)
GLUCOSE SERPL-MCNC: 113 MG/DL (ref 75–110)
HCT VFR BLD CALC: 31.6 % (ref 36–47)
HGB BLD-MCNC: 10.7 G/DL (ref 12–15.5)
MCH RBC QN AUTO: 30.6 PG (ref 27–33.4)
MCHC RBC AUTO-ENTMCNC: 33.9 G/DL (ref 32–36)
MCV RBC AUTO: 90 FL (ref 80–97)
PLATELET # BLD: 374 10^3/UL (ref 150–450)
POTASSIUM SERPL-SCNC: 5.1 MMOL/L (ref 3.6–5)
PROT SERPL-MCNC: 5.5 G/DL (ref 6.3–8.2)
RBC # BLD AUTO: 3.49 10^6/UL (ref 3.72–5.28)
SODIUM SERPL-SCNC: 133.1 MMOL/L (ref 137–145)
WBC # BLD AUTO: 13.3 10^3/UL (ref 4–10.5)

## 2019-01-23 RX ADMIN — HEPARIN SODIUM SCH UNIT: 5000 INJECTION, SOLUTION INTRAVENOUS; SUBCUTANEOUS at 10:20

## 2019-01-23 RX ADMIN — METHYLPREDNISOLONE SODIUM SUCCINATE SCH MG: 40 INJECTION, POWDER, FOR SOLUTION INTRAMUSCULAR; INTRAVENOUS at 02:14

## 2019-01-23 RX ADMIN — MESALAMINE SCH MG: 400 CAPSULE, DELAYED RELEASE ORAL at 22:26

## 2019-01-23 RX ADMIN — HEPARIN SODIUM SCH: 5000 INJECTION, SOLUTION INTRAVENOUS; SUBCUTANEOUS at 22:26

## 2019-01-23 RX ADMIN — METHYLPREDNISOLONE SODIUM SUCCINATE SCH: 40 INJECTION, POWDER, FOR SOLUTION INTRAMUSCULAR; INTRAVENOUS at 21:25

## 2019-01-23 RX ADMIN — METHYLPREDNISOLONE SODIUM SUCCINATE SCH MG: 40 INJECTION, POWDER, FOR SOLUTION INTRAMUSCULAR; INTRAVENOUS at 10:20

## 2019-01-23 RX ADMIN — MESALAMINE SCH MG: 400 CAPSULE, DELAYED RELEASE ORAL at 10:19

## 2019-01-23 RX ADMIN — MERCAPTOPURINE SCH MG: 50 TABLET ORAL at 10:20

## 2019-01-23 RX ADMIN — MESALAMINE SCH MG: 400 CAPSULE, DELAYED RELEASE ORAL at 15:49

## 2019-01-23 RX ADMIN — FLUOXETINE SCH MG: 20 CAPSULE ORAL at 06:00

## 2019-01-23 NOTE — PDOC PROGRESS REPORT
Subjective


Progress Note for:: 01/23/19


Subjective:: 





The patient is a 58-year-old, chronically ill-appearing, female with past 

medical history of ulcerative colitis, psoriatic arthritic, depression, and 

obesity who was admitted 1/9/19 for ulcerative colitis.





The patient was seen this afternoon on rounds, she is resting comfortably in her

bedside chair.  The patient has no complaints.  She states she is currently hav

ing multiple episodes of diarrhea, but this is to be expected given her history 

of ulcerative colitis.  Patient denies blood in her stool, denies excess number 

of bowel movements.  The patient has had multiple near misses where she had to 

be assisted to the floor to prevent a fall due to sudden worsening of leg 

weakness, right greater than left.  Concern for patient's ability to care for 

herself upon returning home.  Plan to transfer patient to assisted living.  

Discharge planning aware.  Awaiting insurance approval.











Reason For Visit: 


ULCERATIVE COLITIS








Physical Exam


Vital Signs: 


                                        











Temp Pulse Resp BP Pulse Ox


 


 97.9 F   100   16   133/70 H  96 


 


 01/23/19 11:23  01/23/19 11:23  01/23/19 11:23  01/23/19 11:23  01/23/19 11:23








                                 Intake & Output











 01/22/19 01/23/19 01/24/19





 06:59 06:59 06:59


 


Intake Total  150 


 


Output Total 15 35 


 


Balance -15 115 


 


Weight 75.5 kg 73 kg 











General appearance: PRESENT: no acute distress, obese


Eye exam: PRESENT: conjunctiva pink, PERRLA


Mouth exam: PRESENT: moist


Respiratory exam: PRESENT: symmetrical, unlabored


Pulses: PRESENT: normal radial pulses, normal dorsalis pedis pul


Vascular exam: PRESENT: normal capillary refill


GI/Abdominal exam: PRESENT: soft.  ABSENT: distended, guarding, tenderness


Rectal exam: PRESENT: deferred


Extremities exam: PRESENT: full ROM.  ABSENT: pedal edema


Musculoskeletal exam: PRESENT: ambulatory - With use of walker, full ROM


Neurological exam: PRESENT: alert, awake, oriented to person, oriented to place,

oriented to time, oriented to situation


Psychiatric exam: PRESENT: appropriate affect


Skin exam: PRESENT: dry, intact, other - DAY drain to anterior abdominal wall





Results


Laboratory Results: 


                                        





                                 01/23/19 06:13 





                                 01/23/19 06:13 





                                        











  01/23/19 01/23/19





  06:13 06:13


 


WBC  13.3 H 


 


RBC  3.49 L 


 


Hgb  10.7 L 


 


Hct  31.6 L 


 


MCV  90 


 


MCH  30.6 


 


MCHC  33.9 


 


RDW  17.5 H 


 


Plt Count  374 


 


Sodium   133.1 L


 


Potassium   5.1 H


 


Chloride   94 L


 


Carbon Dioxide   34 H


 


Anion Gap   5


 


BUN   12


 


Creatinine   0.34 L


 


Est GFR ( Amer)   > 60


 


Est GFR (Non-Af Amer)   > 60


 


Glucose   113 H


 


Calcium   8.3 L


 


Total Bilirubin   2.1 H


 


AST   22


 


ALT   35


 


Alkaline Phosphatase   231 H


 


Total Protein   5.5 L


 


Albumin   3.1 L











Impressions: 


                                        





Percutaneous Drainage  01/10/19 00:00


IMPRESSION:  Successful CT-guided drainage of pelvic abscess.


 








Abdomen Ultrasound  01/11/19 00:00


IMPRESSION:


 


No definitive abscess in the region scanned


 


 


copyright 2011 [employer]- All Rights Reserved


 








Guidance Fluoroscopy  01/13/19 08:00


IMPRESSION:  No pneumothorax postprocedure.  Venous access catheter tip at the 

junction of the left subclavian vein and SVC.  No pneumothorax postprocedure.


 








Chest X-Ray  01/13/19 08:45


IMPRESSION:  No pneumothorax postprocedure.  Venous access catheter tip at the 

junction of the left subclavian vein and SVC.  No pneumothorax postprocedure.


 








Abdomen/Pelvis CT  01/21/19 15:45


IMPRESSION:  Diverticulosis coli with no acute inflammation.  There is a pigtail

catheter in the anterior pelvis.  The purpose is not certain.  No other findings

in the abdomen or pelvis.


 











Status: Imported from PACS





Assessment & Plan





- Diagnosis


(1) Abscess of abdominal cavity


Is this a current diagnosis for this admission?: Yes   


Plan: 





Now status post percutaneous drain placed by surgery.


Blood cultures have no growth at 5 days.


Pelvic fluid grew E. coli and enterococcus faecialis


Repeat ABD/Pelvic CT revealed reticulosis with no acute inflammation; pigtail 

catheter in the anterior pelvis.  No other findings.





Patient will be discharged with DAY drain in place; WILL NEED TO F/U WITH SURGERY

AS AN OUTPATIENT


Discussed with gastroenterology.  


Patient has completed a course of antibiotics.














(2) Anasarca


Is this a current diagnosis for this admission?: Yes   


Plan: 





Improved.


Secondary to hypoalbuminemia; likely multifactorial secondary to poor nutrition 

and poor absorption.





Dietary is consulted; continue high protein diet with dietary supplements.


BRIAN hose and ernestina.














(3) Crohn's disease


Qualifiers: 


   Gastrointestinal tract location: large intestine   Digestive disease 

complication type: with abscess   Qualified Code(s): K50.114 - Crohn's disease 

of large intestine with abscess   


Is this a current diagnosis for this admission?: Yes   


Plan: 





Primary plan per Dr. Michelle; prednisone taper, mercaptopurine, mesalamine, and 

Lialda per his expertise.


Cultures and antibiotics as above.











(4) Debility, unspecified


Is this a current diagnosis for this admission?: Yes   


Plan: 





The patient has had 3 near misses where she had to be assisted to the floor to 

prevent a fall due to sudden worsening of leg weakness; right greater than left.

 


Spoke with nursing supervisor about move of patient to the orthopedic floor 

where nursing/Danii would be more confident in assisting with transfers/ambulation

(in addition to improving patient sense of safety); will try to make 

arrangements.





Have requested PT to continue visits over the weekend.


PT/OT consultations ordered; recommending SNF for short term rehab.


Discharge planning is consulted.


Awaiting insurance approval for transfer to SNF











(5) Depression


Qualifiers: 


   Depression Type: unspecified   Qualified Code(s): F32.9 - Major depressive 

disorder, single episode, unspecified   


Is this a current diagnosis for this admission?: Yes   


Plan: 





Continue home dose Prozac.


Encourage ambulation around unit














- Time


Time Spent with patient: 15-24 minutes


Medications reviewed and adjusted accordingly: Yes


Anticipated discharge: SNF


Within: when bed available, Other - pending insurance approval





- Inpatient Certification


Based on my medical assessment, after consideration of the patient's comorbiditi

es, presenting symptoms, or acuity I expect that the services needed warrant 

INPATIENT care.: Yes


I certify that my determination is in accordance with my understanding of Med

icare's requirements for reasonable and necessary INPATIENT services [42 CFR 

412.3e].: Yes


Medical Necessity: Risk of Complication if Not Cared For in Hospital





- Plan Summary


Plan Summary: 





Transfer to skilled nursing facility pending insurance approval

## 2019-01-24 RX ADMIN — METHYLPREDNISOLONE SODIUM SUCCINATE SCH MG: 40 INJECTION, POWDER, FOR SOLUTION INTRAMUSCULAR; INTRAVENOUS at 17:44

## 2019-01-24 RX ADMIN — MERCAPTOPURINE SCH MG: 50 TABLET ORAL at 10:32

## 2019-01-24 RX ADMIN — HEPARIN SODIUM SCH UNIT: 5000 INJECTION, SOLUTION INTRAVENOUS; SUBCUTANEOUS at 21:16

## 2019-01-24 RX ADMIN — MESALAMINE SCH MG: 400 CAPSULE, DELAYED RELEASE ORAL at 17:51

## 2019-01-24 RX ADMIN — HEPARIN SODIUM SCH UNIT: 5000 INJECTION, SOLUTION INTRAVENOUS; SUBCUTANEOUS at 10:34

## 2019-01-24 RX ADMIN — METHYLPREDNISOLONE SODIUM SUCCINATE SCH MG: 40 INJECTION, POWDER, FOR SOLUTION INTRAMUSCULAR; INTRAVENOUS at 03:10

## 2019-01-24 RX ADMIN — MESALAMINE SCH MG: 400 CAPSULE, DELAYED RELEASE ORAL at 10:32

## 2019-01-24 RX ADMIN — MESALAMINE SCH MG: 400 CAPSULE, DELAYED RELEASE ORAL at 15:29

## 2019-01-24 RX ADMIN — METHYLPREDNISOLONE SODIUM SUCCINATE SCH MG: 40 INJECTION, POWDER, FOR SOLUTION INTRAMUSCULAR; INTRAVENOUS at 10:32

## 2019-01-24 RX ADMIN — FLUOXETINE SCH MG: 20 CAPSULE ORAL at 05:46

## 2019-01-24 NOTE — PDOC PROGRESS REPORT
Subjective


Progress Note for:: 01/24/19


Subjective:: 





The patient is a 58-year-old, chronically ill-appearing, female with past 

medical history of ulcerative colitis, psoriatic arthritic, depression, and 

obesity who was admitted 1/9/19 for ulcerative colitis.





Na levels dropped to 133, d/c 1/2 NS IVF. Patient is tolerating PO diet. 





No change in physical exam. Patient still waiting to transfer patient to 

assisted living.  Discharge planning aware.  Awaiting insurance approval.





no change to medical treatment, patient is only waiting for placement.











Reason For Visit: 


ULCERATIVE COLITIS








Physical Exam


Vital Signs: 


                                        











Temp Pulse Resp BP Pulse Ox


 


 98.0 F   108 H  16   129/57 H  97 


 


 01/24/19 08:03  01/24/19 08:03  01/24/19 08:03  01/24/19 08:03  01/24/19 08:03








                                 Intake & Output











 01/23/19 01/24/19 01/25/19





 06:59 06:59 06:59


 


Intake Total 150 540 


 


Output Total 35  


 


Balance 115 540 


 


Weight 73 kg 77.9 kg 











General appearance: PRESENT: no acute distress, morbidly obese


Eye exam: PRESENT: conjunctiva pink, PERRLA


Mouth exam: PRESENT: moist


Teeth exam: PRESENT: poor dentation


Neck exam: PRESENT: full ROM


Respiratory exam: PRESENT: clear to auscultation umu, symmetrical, unlabored


Cardiovascular exam: PRESENT: +S1, +S2


Pulses: PRESENT: normal radial pulses, normal dorsalis pedis pul


Vascular exam: PRESENT: normal capillary refill


GI/Abdominal exam: PRESENT: soft, other - DAY drain to LLQ.  ABSENT: distended, 

tenderness


Rectal exam: PRESENT: deferred


Extremities exam: PRESENT: full ROM, pedal edema, +1 edema


Musculoskeletal exam: PRESENT: ambulatory - with assistance, full ROM


Neurological exam: PRESENT: alert, awake, oriented to person, oriented to place,

oriented to time, oriented to situation


Psychiatric exam: PRESENT: appropriate affect


Skin exam: PRESENT: dry, intact, normal color





Results


Laboratory Results: 


                                        





                                 01/23/19 06:13 





                                 01/23/19 06:13 








Impressions: 


                                        





Percutaneous Drainage  01/10/19 00:00


IMPRESSION:  Successful CT-guided drainage of pelvic abscess.


 








Abdomen Ultrasound  01/11/19 00:00


IMPRESSION:


 


No definitive abscess in the region scanned


 


 


copyright 2011 [employer]- All Rights Reserved


 








Guidance Fluoroscopy  01/13/19 08:00


IMPRESSION:  No pneumothorax postprocedure.  Venous access catheter tip at the 

junction of the left subclavian vein and SVC.  No pneumothorax postprocedure.


 








Chest X-Ray  01/13/19 08:45


IMPRESSION:  No pneumothorax postprocedure.  Venous access catheter tip at the 

junction of the left subclavian vein and SVC.  No pneumothorax postprocedure.


 








Abdomen/Pelvis CT  01/21/19 15:45


IMPRESSION:  Diverticulosis coli with no acute inflammation.  There is a pigtail

catheter in the anterior pelvis.  The purpose is not certain.  No other findings

in the abdomen or pelvis.


 











Status: Imported from PACS





Assessment & Plan





- Diagnosis


(1) Abscess of abdominal cavity


Is this a current diagnosis for this admission?: Yes   


Plan: 





S/p percutaneous drain placed by surgery.


Blood cultures have no growth.


Pelvic fluid grew E. coli and enterococcus faecialis


Repeat ABD/Pelvic CT revealed reticulosis with no acute inflammation; pigtail 

catheter in the anterior pelvis.  No other findings.





Patient will be discharged with DAY drain in place; WILL NEED TO F/U WITH SURGERY

AS AN OUTPATIENT


Discussed with gastroenterology.  


Patient has completed a course of antibiotics.














(2) Anasarca


Is this a current diagnosis for this admission?: Yes   


Plan: 





Improved.


Secondary to hypoalbuminemia; likely multifactorial secondary to poor nutrition 

and poor absorption.





Dietary is consulted; continue high protein diet with dietary supplements.


BRIAN hose and ernestina.














(3) Crohn's disease


Qualifiers: 


   Gastrointestinal tract location: large intestine   Digestive disease 

complication type: with abscess   Qualified Code(s): K50.114 - Crohn's disease 

of large intestine with abscess   


Is this a current diagnosis for this admission?: Yes   


Plan: 





Primary plan per Dr. Michelle; prednisone taper, mercaptopurine, mesalamine, and 

Lialda per his expertise.


Multiple episodes of loose stool per day but nothing abnormal (per patient)


Cultures and antibiotics as above.











(4) Debility, unspecified


Is this a current diagnosis for this admission?: Yes   


Plan: 





The patient has had 3 near misses where she had to be assisted to the floor to 

prevent a fall due to sudden worsening of leg weakness; right greater than left.

 


Spoke with nursing supervisor about move of patient to the orthopedic floor 

where nursing/Danii would be more confident in assisting with transfers/ambulation

(in addition to improving patient sense of safety); will try to make 

arrangements.





Have requested PT to continue visits over the weekend.


PT/OT consultations ordered; recommending SNF for short term rehab.


Discharge planning is consulted.


Awaiting insurance approval for transfer to SNF











(5) Depression


Qualifiers: 


   Depression Type: unspecified   Qualified Code(s): F32.9 - Major depressive 

disorder, single episode, unspecified   


Is this a current diagnosis for this admission?: Yes   


Plan: 





Continue home dose Prozac.


Encourage ambulation around unit














- Time


Time Spent with patient: 15-24 minutes


Medications reviewed and adjusted accordingly: Yes


Anticipated discharge: SNF





- Inpatient Certification


Based on my medical assessment, after consideration of the patient's 

comorbidities, presenting symptoms, or acuity I expect that the services needed 

warrant INPATIENT care.: Yes


I certify that my determination is in accordance with my understanding of 

Medicare's requirements for reasonable and necessary INPATIENT services [42 CFR 

412.3e].: Yes


Medical Necessity: Need for IV Antibiotics, Risk of Complication if Not Cared 

For in Hospital

## 2019-01-24 NOTE — PDOC PROGRESS REPORT
Subjective


Progress Note for:: 01/24/19


Reason For Visit: 


ULCERATIVE COLITIS


called by nurse for leakage around drain site





Physical Exam


Vital Signs: 


                                        











Temp Pulse Resp BP Pulse Ox


 


 97.7 F   106 H  18   116/63   97 


 


 01/24/19 20:31  01/24/19 20:31  01/24/19 20:31  01/24/19 20:31  01/24/19 20:31








                                 Intake & Output











 01/23/19 01/24/19 01/25/19





 06:59 06:59 06:59


 


Intake Total 150 540 458


 


Output Total 35  


 


Balance 115 540 458


 


Weight 73 kg 77.9 kg 











GI/Abdominal exam: PRESENT: other - perc drain site with stool leaking around 

insertion site abd soft, non tender





Results


Laboratory Results: 


                                        





                                 01/23/19 06:13 





                                 01/23/19 06:13 








Impressions: 


                                        





Percutaneous Drainage  01/10/19 00:00


IMPRESSION:  Successful CT-guided drainage of pelvic abscess.


 








Abdomen Ultrasound  01/11/19 00:00


IMPRESSION:


 


No definitive abscess in the region scanned


 


 


copyright 2011 [employer]- All Rights Reserved


 








Guidance Fluoroscopy  01/13/19 08:00


IMPRESSION:  No pneumothorax postprocedure.  Venous access catheter tip at the 

junction of the left subclavian vein and SVC.  No pneumothorax postprocedure.


 








Chest X-Ray  01/13/19 08:45


IMPRESSION:  No pneumothorax postprocedure.  Venous access catheter tip at the 

junction of the left subclavian vein and SVC.  No pneumothorax postprocedure.


 








Abdomen/Pelvis CT  01/21/19 15:45


IMPRESSION:  Diverticulosis coli with no acute inflammation.  There is a pigtail

catheter in the anterior pelvis.  The purpose is not certain.  No other findings

in the abdomen or pelvis.


 














Assessment & Plan





- Diagnosis


(1) Abscess of abdominal cavity


Is this a current diagnosis for this admission?: Yes   





- Plan Summary


Plan Summary: 





it appears accordian drain is clogged


drain was cleared


and pigtail cath was irritated with about 100cc of ns till 


return was clear,


reconnected to accordian drain


instructed nurse to obtain new accordian drain from 


ir and replace.

## 2019-01-25 VITALS — SYSTOLIC BLOOD PRESSURE: 139 MMHG | DIASTOLIC BLOOD PRESSURE: 59 MMHG

## 2019-01-25 RX ADMIN — METHYLPREDNISOLONE SODIUM SUCCINATE SCH: 40 INJECTION, POWDER, FOR SOLUTION INTRAMUSCULAR; INTRAVENOUS at 17:47

## 2019-01-25 RX ADMIN — MESALAMINE SCH MG: 400 CAPSULE, DELAYED RELEASE ORAL at 14:24

## 2019-01-25 RX ADMIN — FLUOXETINE SCH MG: 20 CAPSULE ORAL at 06:08

## 2019-01-25 RX ADMIN — MESALAMINE SCH MG: 400 CAPSULE, DELAYED RELEASE ORAL at 09:58

## 2019-01-25 RX ADMIN — METHYLPREDNISOLONE SODIUM SUCCINATE SCH MG: 40 INJECTION, POWDER, FOR SOLUTION INTRAMUSCULAR; INTRAVENOUS at 02:44

## 2019-01-25 RX ADMIN — MESALAMINE SCH MG: 400 CAPSULE, DELAYED RELEASE ORAL at 17:48

## 2019-01-25 RX ADMIN — MERCAPTOPURINE SCH MG: 50 TABLET ORAL at 09:58

## 2019-01-25 RX ADMIN — HEPARIN SODIUM SCH UNIT: 5000 INJECTION, SOLUTION INTRAVENOUS; SUBCUTANEOUS at 09:58

## 2019-01-25 RX ADMIN — METHYLPREDNISOLONE SODIUM SUCCINATE SCH MG: 40 INJECTION, POWDER, FOR SOLUTION INTRAMUSCULAR; INTRAVENOUS at 09:58

## 2019-01-25 NOTE — PDOC TRANSFER SUMMARY
Addendum entered and electronically signed by GAGANDEEP GARCIA NP  02/15/19 

07:39: 








General


Admission Date/PCP: 


  01/09/19 17:30





  DEBBIE GUTIERREZ DO





Admission Date: 01/09/19


Transfer Date: 01/25/19


Resuscitation Status: Full Code





- Transfer Diagnosis


(1) Abscess of abdominal cavity


Is this a current diagnosis for this admission?: Yes   





(2) Anasarca


Is this a current diagnosis for this admission?: Yes   





(3) Crohn's disease


Is this a current diagnosis for this admission?: Yes   





(4) Debility, unspecified


Is this a current diagnosis for this admission?: Yes   





(5) Depression


Is this a current diagnosis for this admission?: Yes   





(6) Pressure ulcer


Is this a current diagnosis for this admission?: Yes   


Diagnosis Summary: 


STAGE II PRESSURE ULCER ON COCCYX. PRESENT ON ADMISSION. 








- Transfer Medications


Home Medications: 


                                        





Fluoxetine HCl [Prozac] 40 mg PO DAILY 01/09/19 


Fluticasone Propionate [Flonase Nasal Spray 50 Mcg/Spray 16 gm] 2 spray NASL 

DAILYP PRN 01/09/19 


Gabapentin [Neurontin 300 mg Capsule] 300 mg PO DAILY 01/09/19 


Gabapentin [Neurontin 300 mg Capsule] 600 mg PO QHS 01/09/19 


Oxycodone HCl/Acetaminophen [Percocet 5-325 mg Tablet] 0.5 tab PO Q6HP PRN 

02/06/19 











- Allergies


Allergies/Adverse Reactions: 


                                        





pineapple Allergy (Mild, Verified 02/06/19 12:14)


   


latex [Latex] Allergy (Verified 02/06/19 12:14)


   itching


nickel [Nickel] Allergy (Verified 02/06/19 12:14)


   ITCHY RASH


sulfamethoxazole [From Bactrim] Allergy (Verified 02/06/19 12:14)


   red rash


trimethoprim [From Bactrim] Allergy (Verified 02/06/19 12:14)


   red rash


MERCURY Allergy (Uncoded 02/06/19 12:14)


   ITCHY RASH











- Diet/Activity


Discharge Diet: As Tolerated





Original Note:








General


Admission Date/PCP: 


  01/09/19 17:30





  DEBBIE GUTIERREZ DO





Admission Date: 01/09/19


Transfer Date: 01/25/19


Resuscitation Status: Full Code





- Transfer Diagnosis


(1) Abscess of abdominal cavity


Is this a current diagnosis for this admission?: Yes   





(2) Anasarca


Is this a current diagnosis for this admission?: Yes   





(3) Crohn's disease


Is this a current diagnosis for this admission?: Yes   





(4) Debility, unspecified


Is this a current diagnosis for this admission?: Yes   





(5) Depression


Is this a current diagnosis for this admission?: Yes   





- Transfer Medications


Home Medications: 


                                        





Fluoxetine HCl [Prozac] 40 mg PO DAILY 01/09/19 


Fluticasone Propionate [Flonase Nasal Spray 50 Mcg/Spray 16 gm] 2 spray NASL 

DAILYP PRN 01/09/19 


Gabapentin [Neurontin 300 mg Capsule] 300 mg PO DAILY 01/09/19 


Gabapentin [Neurontin 300 mg Capsule] 600 mg PO QHS 01/09/19 


Hydrochlorothiazide [Hydrodiuril 25 mg Tablet] 25 mg PO DAILY 01/09/19 


Ixekizumab [Taltz Autoinjector] 80 mg IM [id number] 01/09/19 


Mercaptopurine [Purinethol 50 mg Tablet] 50 mg PO DAILY 01/09/19 


Mesalamine [Lialda] 4.8 gm PO DAILY 01/09/19 


Mesalamine [Rowasa 1000 mg Supp.rect] 1,000 mg [place] DAILYP PRN 01/09/19 


Mirtazapine [Remeron 15 mg Tablet] 15 mg PO QHS 01/09/19 








Transfer Medications: 


                               Current Medications





Fluoxetine HCl (Prozac 20 Mg Capsule)  40 mg PO DAILY@0600 [facility]


   Stop: 02/15/19 05:59


   Last Admin: 01/25/19 06:08 Dose:  40 mg


   Documented by: 


Heparin Sodium (Porcine) (Heparin Inj 5,000 Units/Ml 1 Ml Syringe)  5,000 unit 

SUBCUT Q12 [facility]


   Stop: 02/10/19 21:59


   Last Admin: 01/25/19 09:58 Dose:  5,000 unit


   Documented by: 


Influenza Virus Vaccine Quadrival (Fluarix Adlt Quad 2018-19 Vac 0.5 Ml Syr)  

0.5 ml IM .DISCHARGE PRN


   PRN Reason: THIS MED IS NOT "PRN"


   Stop: 02/14/19 16:24


Mercaptopurine (Purinethol 50 Mg Tablet)  50 mg PO DAILY [facility]


   Stop: 02/14/19 09:59


   Last Admin: 01/25/19 09:58 Dose:  50 mg


   Documented by: 


Mesalamine (Asacol Sr 400 Mg Capsule)  800 mg PO TID LIBBY


   Stop: 02/13/19 21:59


   Last Admin: 01/25/19 14:24 Dose:  800 mg


   Documented by: 


Methylprednisolone Sodium Succinate (Solu-Medrol Inj/Pf 40 Mg/1 Ml Sdv)  20 mg 

IV Q8A LIBBY


   Stop: 02/08/19 17:59


   Last Admin: 01/25/19 09:58 Dose:  20 mg


   Documented by: 











- Allergies


Allergies/Adverse Reactions: 


                                        





pineapple Allergy (Mild, Verified 01/09/19 12:11)


   


latex [Latex] Allergy (Verified 01/09/19 11:43)


   itching


nickel [Nickel] Allergy (Verified 01/09/19 11:43)


   ITCHY RASH


sulfamethoxazole [From Bactrim] Allergy (Verified 01/09/19 11:43)


   red rash


trimethoprim [From Bactrim] Allergy (Verified 01/09/19 11:43)


   red rash


MERCURY Allergy (Uncoded 01/09/19 11:43)


   ITCHY RASH











- Diet/Activity


Discharge Diet: As Tolerated





Hospital Course


Hospital Course: 





The patient is a 58-year-old, chronically ill-appearing, female with past 

medical history of ulcerative colitis, psoriatic arthritic, depression, and 

obesity who was admitted 1/9/19 for ulcerative colitis.  Radiology imaging 

showed an intra-abdominal abscess which was drained by surgery.  Abscess 

drainage cultured and positive for E. coli and Enterococcus faecalis.  Treated 

with Flagyl IV and imipenem IV.  The patient has completed her antibiotic 

therapies.  DAY drain remains in place. Repeat ABD/Pelvic CT revealed reticulosis

with no acute inflammation; pigtail catheter in the anterior pelvis.  No other 

findings.  The patient will need to follow-up with surgery in 1 week. 





For any further information regarding this patient's hospitalization, please 

refer to the EMR.














Physical Exam


Vital Signs: 


                                        











Temp Pulse Resp BP Pulse Ox


 


 98.0 F   91   16   132/62 H  96 


 


 01/25/19 12:06  01/25/19 12:06  01/25/19 12:06  01/25/19 12:06  01/25/19 12:06








                                 Intake & Output











 01/24/19 01/25/19 01/26/19





 06:59 06:59 06:59


 


Intake Total 


 


Balance 


 


Weight 77.9 kg  











General appearance: PRESENT: no acute distress


Eye exam: PRESENT: PERRLA


Respiratory exam: PRESENT: clear to auscultation umu, symmetrical, unlabored





Results


Laboratory Results: 


                                        





                                 01/23/19 06:13 





                                 01/23/19 06:13 








Impressions: 


                                        





Percutaneous Drainage  01/10/19 00:00


IMPRESSION:  Successful CT-guided drainage of pelvic abscess.


 








Abdomen Ultrasound  01/11/19 00:00


IMPRESSION:


 


No definitive abscess in the region scanned


 


 


copyright 2011 [employer]- All Rights Reserved


 








Guidance Fluoroscopy  01/13/19 08:00


IMPRESSION:  No pneumothorax postprocedure.  Venous access catheter tip at the 

junction of the left subclavian vein and SVC.  No pneumothorax postprocedure.


 








Chest X-Ray  01/13/19 08:45


IMPRESSION:  No pneumothorax postprocedure.  Venous access catheter tip at the 

junction of the left subclavian vein and SVC.  No pneumothorax postprocedure.


 








Abdomen/Pelvis CT  01/21/19 15:45


IMPRESSION:  Diverticulosis coli with no acute inflammation.  There is a pigtail

catheter in the anterior pelvis.  The purpose is not certain.  No other findings

in the abdomen or pelvis.


 











Status: Imported from PACS





Plan


Discharge Plan: 





Follow-up with Dr. Thornton in 1 week.  Return to the hospital for fever, 

abdominal pain, persistent nausea or vomiting, surgical site infection


Time Spent: Greater than 30 Minutes

## 2019-01-29 NOTE — PHYSICIAN ADVISORY NOTE
Physician Advisor ProgressNote


.: 


Pursuant to the  plan for [facility], I have reviewed the medical record 

for this patient.  





Physician Advisor Statement: 





Pt came in w/suspected dx UC based on findings from eval in Nov, when she was 

first dx'd per GI.  


On colonoscopy this adm, findings were more consistent w/Crohn's dz per GI, who 

thereafter documented Crohn's dz dx.  


Ongoing attending notes thereafter, including DCS, continue to state reason for 

visit was "UC" [?is this an autopopulated piece of info that cannot be corrected

 by attg?], but then give dx "Crohn's dz" in dx list.  This can be confusing to 

reviewers & to subsequent attendings who may review this adm's chart for info & 

wonder if pt is felt to have both dx.s, or which one is accurate.





Please clarify in DCS the GI dx.s - what was ruled out, & what was ruled in.





Thanks!


CK

## 2019-02-06 ENCOUNTER — HOSPITAL ENCOUNTER (INPATIENT)
Dept: HOSPITAL 62 - ER | Age: 59
LOS: 13 days | DRG: 329 | End: 2019-02-19
Attending: SURGERY | Admitting: SURGERY
Payer: COMMERCIAL

## 2019-02-06 DIAGNOSIS — Z91.018: ICD-10-CM

## 2019-02-06 DIAGNOSIS — K51.913: ICD-10-CM

## 2019-02-06 DIAGNOSIS — A41.9: ICD-10-CM

## 2019-02-06 DIAGNOSIS — K65.1: ICD-10-CM

## 2019-02-06 DIAGNOSIS — Z79.899: ICD-10-CM

## 2019-02-06 DIAGNOSIS — F32.9: ICD-10-CM

## 2019-02-06 DIAGNOSIS — Z87.891: ICD-10-CM

## 2019-02-06 DIAGNOSIS — M19.90: ICD-10-CM

## 2019-02-06 DIAGNOSIS — R65.21: ICD-10-CM

## 2019-02-06 DIAGNOSIS — Z88.2: ICD-10-CM

## 2019-02-06 DIAGNOSIS — N17.0: ICD-10-CM

## 2019-02-06 DIAGNOSIS — L40.9: ICD-10-CM

## 2019-02-06 DIAGNOSIS — J96.01: ICD-10-CM

## 2019-02-06 DIAGNOSIS — K42.9: ICD-10-CM

## 2019-02-06 DIAGNOSIS — Z90.49: ICD-10-CM

## 2019-02-06 DIAGNOSIS — Z51.5: ICD-10-CM

## 2019-02-06 DIAGNOSIS — I48.91: ICD-10-CM

## 2019-02-06 DIAGNOSIS — K57.20: Primary | ICD-10-CM

## 2019-02-06 DIAGNOSIS — Z91.040: ICD-10-CM

## 2019-02-06 DIAGNOSIS — Z90.710: ICD-10-CM

## 2019-02-06 LAB
%HYPO/RBC NFR BLD AUTO: SLIGHT %
ABSOLUTE LYMPHOCYTES# (MANUAL): 0.3 10^3/UL (ref 0.5–4.7)
ABSOLUTE MONOCYTES # (MANUAL): 0 10^3/UL (ref 0.1–1.4)
ABSOLUTE NEUTROPHILS# (MANUAL): 10.6 10^3/UL (ref 1.7–8.2)
ADD MANUAL DIFF: YES
ALBUMIN SERPL-MCNC: 2.9 G/DL (ref 3.5–5)
ALP SERPL-CCNC: 847 U/L (ref 38–126)
ALT SERPL-CCNC: 234 U/L (ref 9–52)
ANION GAP SERPL CALC-SCNC: 9 MMOL/L (ref 5–19)
AST SERPL-CCNC: 149 U/L (ref 14–36)
BASOPHILS NFR BLD MANUAL: 0 % (ref 0–2)
BILIRUB DIRECT SERPL-MCNC: 1.1 MG/DL (ref 0–0.4)
BILIRUB SERPL-MCNC: 2 MG/DL (ref 0.2–1.3)
BUN SERPL-MCNC: 27 MG/DL (ref 7–20)
CALCIUM: 8.4 MG/DL (ref 8.4–10.2)
CHLORIDE SERPL-SCNC: 93 MMOL/L (ref 98–107)
CO2 SERPL-SCNC: 32 MMOL/L (ref 22–30)
EOSINOPHIL NFR BLD MANUAL: 0 % (ref 0–6)
ERYTHROCYTE [DISTWIDTH] IN BLOOD BY AUTOMATED COUNT: 19.6 % (ref 11.5–14)
GLUCOSE SERPL-MCNC: 101 MG/DL (ref 75–110)
HCT VFR BLD CALC: 30.8 % (ref 36–47)
HGB BLD-MCNC: 10.6 G/DL (ref 12–15.5)
MCH RBC QN AUTO: 32.7 PG (ref 27–33.4)
MCHC RBC AUTO-ENTMCNC: 34.2 G/DL (ref 32–36)
MCV RBC AUTO: 96 FL (ref 80–97)
MONOCYTES % (MANUAL): 0 % (ref 3–13)
NEUTS BAND NFR BLD MANUAL: 27 % (ref 3–5)
PLATELET # BLD: 263 10^3/UL (ref 150–450)
PLATELET COMMENT: ADEQUATE
PLATELET LARGE: PRESENT
POTASSIUM SERPL-SCNC: 4.5 MMOL/L (ref 3.6–5)
PROT SERPL-MCNC: 5.3 G/DL (ref 6.3–8.2)
RBC # BLD AUTO: 3.23 10^6/UL (ref 3.72–5.28)
SEGMENTED NEUTROPHILS % (MAN): 70 % (ref 42–78)
SODIUM SERPL-SCNC: 134.2 MMOL/L (ref 137–145)
STOMATOCYTES BLD QL SMEAR: SLIGHT
TOTAL CELLS COUNTED BLD: 100
VARIANT LYMPHS NFR BLD MANUAL: 3 % (ref 13–45)
WBC # BLD AUTO: 10.9 10^3/UL (ref 4–10.5)

## 2019-02-06 PROCEDURE — 84478 ASSAY OF TRIGLYCERIDES: CPT

## 2019-02-06 PROCEDURE — 87075 CULTR BACTERIA EXCEPT BLOOD: CPT

## 2019-02-06 PROCEDURE — 94002 VENT MGMT INPAT INIT DAY: CPT

## 2019-02-06 PROCEDURE — 49424 ASSESS CYST CONTRAST INJECT: CPT

## 2019-02-06 PROCEDURE — P9016 RBC LEUKOCYTES REDUCED: HCPCS

## 2019-02-06 PROCEDURE — 36430 TRANSFUSION BLD/BLD COMPNT: CPT

## 2019-02-06 PROCEDURE — 83735 ASSAY OF MAGNESIUM: CPT

## 2019-02-06 PROCEDURE — 82553 CREATINE MB FRACTION: CPT

## 2019-02-06 PROCEDURE — 86901 BLOOD TYPING SEROLOGIC RH(D): CPT

## 2019-02-06 PROCEDURE — 76700 US EXAM ABDOM COMPLETE: CPT

## 2019-02-06 PROCEDURE — 84484 ASSAY OF TROPONIN QUANT: CPT

## 2019-02-06 PROCEDURE — 71250 CT THORAX DX C-: CPT

## 2019-02-06 PROCEDURE — S0164 INJECTION PANTROPRAZOLE: HCPCS

## 2019-02-06 PROCEDURE — 88304 TISSUE EXAM BY PATHOLOGIST: CPT

## 2019-02-06 PROCEDURE — 84100 ASSAY OF PHOSPHORUS: CPT

## 2019-02-06 PROCEDURE — A9539 TC99M PENTETATE: HCPCS

## 2019-02-06 PROCEDURE — 81001 URINALYSIS AUTO W/SCOPE: CPT

## 2019-02-06 PROCEDURE — 85384 FIBRINOGEN ACTIVITY: CPT

## 2019-02-06 PROCEDURE — 93005 ELECTROCARDIOGRAM TRACING: CPT

## 2019-02-06 PROCEDURE — 85362 FIBRIN DEGRADATION PRODUCTS: CPT

## 2019-02-06 PROCEDURE — 87086 URINE CULTURE/COLONY COUNT: CPT

## 2019-02-06 PROCEDURE — C9290 INJ, BUPIVACAINE LIPOSOME: HCPCS

## 2019-02-06 PROCEDURE — 74176 CT ABD & PELVIS W/O CONTRAST: CPT

## 2019-02-06 PROCEDURE — 36556 INSERT NON-TUNNEL CV CATH: CPT

## 2019-02-06 PROCEDURE — 87077 CULTURE AEROBIC IDENTIFY: CPT

## 2019-02-06 PROCEDURE — 86900 BLOOD TYPING SEROLOGIC ABO: CPT

## 2019-02-06 PROCEDURE — 87493 C DIFF AMPLIFIED PROBE: CPT

## 2019-02-06 PROCEDURE — 86920 COMPATIBILITY TEST SPIN: CPT

## 2019-02-06 PROCEDURE — 85730 THROMBOPLASTIN TIME PARTIAL: CPT

## 2019-02-06 PROCEDURE — 87205 SMEAR GRAM STAIN: CPT

## 2019-02-06 PROCEDURE — 87340 HEPATITIS B SURFACE AG IA: CPT

## 2019-02-06 PROCEDURE — 82803 BLOOD GASES ANY COMBINATION: CPT

## 2019-02-06 PROCEDURE — 87101 SKIN FUNGI CULTURE: CPT

## 2019-02-06 PROCEDURE — 78606 BRAIN IMAGE W/FLOW 4 + VIEWS: CPT

## 2019-02-06 PROCEDURE — 95819 EEG AWAKE AND ASLEEP: CPT

## 2019-02-06 PROCEDURE — 82570 ASSAY OF URINE CREATININE: CPT

## 2019-02-06 PROCEDURE — 94003 VENT MGMT INPAT SUBQ DAY: CPT

## 2019-02-06 PROCEDURE — 88307 TISSUE EXAM BY PATHOLOGIST: CPT

## 2019-02-06 PROCEDURE — 82962 GLUCOSE BLOOD TEST: CPT

## 2019-02-06 PROCEDURE — 80053 COMPREHEN METABOLIC PANEL: CPT

## 2019-02-06 PROCEDURE — 87040 BLOOD CULTURE FOR BACTERIA: CPT

## 2019-02-06 PROCEDURE — 93010 ELECTROCARDIOGRAM REPORT: CPT

## 2019-02-06 PROCEDURE — 84134 ASSAY OF PREALBUMIN: CPT

## 2019-02-06 PROCEDURE — 85025 COMPLETE CBC W/AUTO DIFF WBC: CPT

## 2019-02-06 PROCEDURE — 99285 EMERGENCY DEPT VISIT HI MDM: CPT

## 2019-02-06 PROCEDURE — 87070 CULTURE OTHR SPECIMN AEROBIC: CPT

## 2019-02-06 PROCEDURE — 83690 ASSAY OF LIPASE: CPT

## 2019-02-06 PROCEDURE — 86317 IMMUNOASSAY INFECTIOUS AGENT: CPT

## 2019-02-06 PROCEDURE — 86850 RBC ANTIBODY SCREEN: CPT

## 2019-02-06 PROCEDURE — 71045 X-RAY EXAM CHEST 1 VIEW: CPT

## 2019-02-06 PROCEDURE — 85610 PROTHROMBIN TIME: CPT

## 2019-02-06 PROCEDURE — 84300 ASSAY OF URINE SODIUM: CPT

## 2019-02-06 PROCEDURE — 02HV33Z INSERTION OF INFUSION DEVICE INTO SUPERIOR VENA CAVA, PERCUTANEOUS APPROACH: ICD-10-PCS | Performed by: SURGERY

## 2019-02-06 PROCEDURE — 76000 FLUOROSCOPY <1 HR PHYS/QHP: CPT

## 2019-02-06 PROCEDURE — P9047 ALBUMIN (HUMAN), 25%, 50ML: HCPCS

## 2019-02-06 PROCEDURE — 87186 SC STD MICRODIL/AGAR DIL: CPT

## 2019-02-06 PROCEDURE — P9035 PLATELET PHERES LEUKOREDUCED: HCPCS

## 2019-02-06 PROCEDURE — C1751 CATH, INF, PER/CENT/MIDLINE: HCPCS

## 2019-02-06 PROCEDURE — 85379 FIBRIN DEGRADATION QUANT: CPT

## 2019-02-06 PROCEDURE — 76937 US GUIDE VASCULAR ACCESS: CPT

## 2019-02-06 PROCEDURE — 82550 ASSAY OF CK (CPK): CPT

## 2019-02-06 PROCEDURE — 86704 HEP B CORE ANTIBODY TOTAL: CPT

## 2019-02-06 PROCEDURE — 80074 ACUTE HEPATITIS PANEL: CPT

## 2019-02-06 PROCEDURE — 82140 ASSAY OF AMMONIA: CPT

## 2019-02-06 PROCEDURE — 49465 FLUORO EXAM OF G/COLON TUBE: CPT

## 2019-02-06 PROCEDURE — P9017 PLASMA 1 DONOR FRZ W/IN 8 HR: HCPCS

## 2019-02-06 PROCEDURE — 80048 BASIC METABOLIC PNL TOTAL CA: CPT

## 2019-02-06 PROCEDURE — 83605 ASSAY OF LACTIC ACID: CPT

## 2019-02-06 PROCEDURE — 70450 CT HEAD/BRAIN W/O DYE: CPT

## 2019-02-06 PROCEDURE — 85027 COMPLETE CBC AUTOMATED: CPT

## 2019-02-06 PROCEDURE — 36415 COLL VENOUS BLD VENIPUNCTURE: CPT

## 2019-02-06 RX ADMIN — ENOXAPARIN SODIUM SCH MG: 40 INJECTION SUBCUTANEOUS at 21:53

## 2019-02-06 NOTE — RADIOLOGY REPORT (SQ)
EXAM DESCRIPTION: 



XR CHEST 1 VIEW



COMPLETED DATE/TME:  02/06/2019 00:00



CLINICAL HISTORY: 



58 years, Female, central line 



COMPARISON:

1/13/2019 chest



NUMBER OF VIEWS:

1



TECHNIQUE:

Portable chest



LIMITATIONS:

None.



FINDINGS:



Heart size is normal. Elevation left hemidiaphragm. Central

venous catheter in place, with the tip likely in the cavoatrial

junction. No pneumothorax. Subsegmental atelectasis left lung

base. Lungs are otherwise clear.



IMPRESSION:



Subsegmental atelectasis left lung base. Mild elevation left

hemidiaphragm

 



copyright 2011 [employer]- All Rights Reserved

## 2019-02-06 NOTE — OPERATIVE REPORT E
Operative Report



NAME: JARVIS WELCH

MRN:  [id number]          : 1960 AGE:  58Y

DATE OF SURGERY: 2019               ROOM: 205



PREOPERATIVE DIAGNOSIS:

POOR VEINS FOR IV ACCESS.



POSTOPERATIVE DIAGNOSIS:

POOR VEINS FOR IV ACCESS.



PROCEDURE:

Placement of right subclavian triple-lumen catheter.



SURGEON:

MAGDIEL CORONA M.D.



ANESTHESIA:

Local.



INDICATION:

This is a 58-year-old female admitted for an abdominal abscess and

diverticulitis.  The patient had a percutaneous drainage of the abscess in

the past and the drain is in place.  She needed access for IV antibiotics.



DESCRIPTION OF PROCEDURE:

The patient was placed in Trendelenburg and the right neck and upper chest

where then prepped and draped in the usual sterile fashion.  The

infraclavicular area was injected with lidocaine and the right subclavian

subsequently punctured and guidewire passed through the needle towards the

area of the superior vena cava and the needle removed.  Insertion site was

then dilated and a triple-lumen catheter inserted to a distance of about

17 cm.  The catheter was then anchored to the skin with 3-0 Silk.  All the

3 ports aspirated blood easily and instilled saline easily.  Biopatch

placed at the insertion site and a transparent dressing placed over the

Biopatch and catheter.  Chest x-ray will be obtained for placement and to

rule out any pneumothorax.  The patient tolerated the procedure well.



DICTATING PHYSICIAN:  MAGDIEL CORONA M.D.





[id number]                  DT: 2019

PHY#: [id number]            DD: 2019

ID:   [id number]           JOB#: [id number]       ACCT: [id number]



cc:MAGDIEL CORONA M.D.

>

## 2019-02-06 NOTE — ER DOCUMENT REPORT
ED General





- General


Chief Complaint: Abdominal Pain


Stated Complaint: ABDOMINAL PAIN


Time Seen by Provider: 02/06/19 14:04


Primary Care Provider: 


DEBBIE GUTIERREZ DO [Primary Care Provider] - Follow up as needed


Notes: 





58-year-old female with ulcerative colitis, pulmonary fibrosis, glaucoma, 

rheumatoid arthritis, headaches presents from Dr. Thornton's office with concern 

for possible abdominal abscess.  Patient with an existing percutaneous drain in 

the lower abdomen placed for a previous abscess.  Patient has had symptoms for 

approximately 4 days and complains of nausea without vomiting, chills with no 

fever, and mild generalized abdominal pain.  Of note patient reports that she 

has an existing sacral decubitus ulcer.





I have greeted and performed a rapid initial assessment of this patient.  A 

comprehensive ED assessment and evaluation of the patient, analysis of test 

results and completion of medical decision making process will be conducted by 

an additional ED providers.


TRAVEL OUTSIDE OF THE [place] IN LAST 30 DAYS: No





- Related Data


Allergies/Adverse Reactions: 


                                        





pineapple Allergy (Mild, Verified 02/06/19 12:14)


   


latex [Latex] Allergy (Verified 02/06/19 12:14)


   itching


nickel [Nickel] Allergy (Verified 02/06/19 12:14)


   ITCHY RASH


sulfamethoxazole [From Bactrim] Allergy (Verified 02/06/19 12:14)


   red rash


trimethoprim [From Bactrim] Allergy (Verified 02/06/19 12:14)


   red rash


MERCURY Allergy (Uncoded 02/06/19 12:14)


   ITCHY RASH











Past Medical History





- Social History


Family History: Reviewed & Not Pertinent





- Past Medical History


Cardiac Medical History: 


   Denies: Hx Coronary Artery Disease, Hx Heart Attack, Hx Hypertension


Pulmonary Medical History: 


   Denies: Hx Asthma, Hx Bronchitis, Hx COPD, Hx Pneumonia


Neurological Medical History: Denies: Hx Cerebrovascular Accident, Hx Seizures


Renal/ Medical History: Denies: Hx Peritoneal Dialysis


GI Medical History: Denies: Hx Hepatitis, Hx Hiatal Hernia, Hx Ulcer


Musculoskeletal Medical History: Reports Hx Arthritis - psoriatic, Reports Hx 

Musculoskeletal Trauma - Right knee fracture 10 years ago


Skin Medical History: Reports Hx Psoriasis


Psychiatric Medical History: Reports: Hx Depression


Infectious Medical History: Denies: Hx Hepatitis


Past Surgical History: Reports: Hx Abdominal Surgery - hernia repair, Hx Breast 

Surgery - right breast lump removed, Hx Cholecystectomy, Hx Hysterectomy, Hx 

Orthopedic Surgery - right knee, left hand.  Denies: Hx Mastectomy, Hx Open 

Heart Surgery





- Immunizations


Hx Diphtheria, Pertussis, Tetanus Vaccination: Yes





Physical Exam





- Vital signs


Vitals: 


                                        











Temp Pulse Resp BP Pulse Ox


 


 97.9 F   100   20   102/69   99 


 


 02/06/19 12:31  02/06/19 12:31  02/06/19 12:31  02/06/19 12:31  02/06/19 12:31














- Abdominal


Inspection: Other - Existing percutaneous drain suprapubic area with leaking 

stool around it.


Distension: No distension





Course





- Vital Signs


Vital signs: 


                                        











Temp Pulse Resp BP Pulse Ox


 


 97.9 F   100   20   102/69   99 


 


 02/06/19 12:31  02/06/19 12:31  02/06/19 12:31  02/06/19 12:31  02/06/19 12:31














Discharge





- Discharge


Referrals: 


DEBBIE GUTIERREZ DO [Primary Care Provider] - Follow up as needed

## 2019-02-06 NOTE — PDOC H&P
History of Present Illness


Admission Date/PCP: 


  02/06/19 16:44





  DEBBIE GUTIERREZ DO





Patient complains of: abdominal pains


History of Present Illness: 


JARVIS WELCH is a 58 year old female with known ulcerative colitis and had 

percutaneous drainage of intra-abdominal pelvic abscess 1/10/2019.


She was seen in the surgical clinic c/o lower abdominal pains. She was sent to 

ER because drain appears to have been clogged and patient may be developing a 

new abscess. The drain however opened up and drained about 60 ccs of purulent 

material. A ccccccccccCT scan was done which showed no abscess but with sigmoid 

and descending colon diverticulitis.


Denies fever/chills,diarrhea,constipation, nausea nor vomiting.








Past Medical History


Cardiac Medical History: 


   Denies: Coronary Artery Disease, Myocardial Infarction, Hypertension


Pulmonary Medical History: 


   Denies: Asthma, Bronchitis, Chronic Obstructive Pulmonary Disease (COPD), 

Pneumonia


Neurological Medical History: 


   Denies: Seizures


GI Medical History: 


   Denies: Hepatitis, Hiatal Hernia


Musculoskeltal Medical History: Reports: Arthritis - psoriatic


Skin Medical History: Reports: Psoriasis


Psychiatric Medical History: Reports: Depression


Hematology: 


   Denies: Anemia, Sickle Cell Disease





Past Surgical History


Past Surgical History: Reports: Cholecystectomy, Hysterectomy, Orthopedic 

Surgery - right knee, left hand


   Denies: Amputation, Mastectomy





Social History


Smoking Status: Former Smoker


Frequency of Alcohol Use: None


Drugs: None





Family History


Family History: Reviewed & Not Pertinent


Parental Family History Reviewed: Yes


Children Family History Reviewed: No


Sibling(s) Family History Reviewed.: No





Medication/Allergy


Home Medications: 








Fluoxetine HCl [Prozac] 40 mg PO DAILY 01/09/19 


Fluticasone Propionate [Flonase Nasal Spray 50 Mcg/Spray 16 gm] 2 spray NASL 

DAILYP PRN 01/09/19 


Gabapentin [Neurontin 300 mg Capsule] 300 mg PO DAILY 01/09/19 


Gabapentin [Neurontin 300 mg Capsule] 600 mg PO QHS 01/09/19 


Oxycodone HCl/Acetaminophen [Percocet 5-325 mg Tablet] 0.5 tab PO Q6HP PRN 

02/06/19 








Allergies/Adverse Reactions: 


                                        





pineapple Allergy (Mild, Verified 02/06/19 12:14)


   


latex [Latex] Allergy (Verified 02/06/19 12:14)


   itching


nickel [Nickel] Allergy (Verified 02/06/19 12:14)


   ITCHY RASH


sulfamethoxazole [From Bactrim] Allergy (Verified 02/06/19 12:14)


   red rash


trimethoprim [From Bactrim] Allergy (Verified 02/06/19 12:14)


   red rash


MERCURY Allergy (Uncoded 02/06/19 12:14)


   ITCHY RASH











Review of Systems


Constitutional: PRESENT: as per HPI


Eyes: PRESENT: other - no visual/hearing changes


Cardiovascular: PRESENT: other - no chest pains/cough


Gastrointestinal: PRESENT: abdominal pain


Genitourinary: PRESENT: other - no dysuria





Physical Exam


Vital Signs: 


                                        











Temp Pulse Resp BP Pulse Ox


 


 97.9 F   100   15   106/50 L  92 


 


 02/06/19 12:31  02/06/19 12:31  02/06/19 18:00  02/06/19 16:10  02/06/19 19:10








                                 Intake & Output











 02/05/19 02/06/19 02/07/19





 06:59 06:59 06:59


 


Weight   68.1 kg











General appearance: PRESENT: mild distress


Head exam: PRESENT: atraumatic


Eye exam: PRESENT: conjunctiva pink


Mouth exam: PRESENT: moist


Neck exam: PRESENT: full ROM


Respiratory exam: PRESENT: clear to auscultation umu


Cardiovascular exam: PRESENT: RRR


Pulses: PRESENT: normal radial pulses


Vascular exam: PRESENT: normal capillary refill


GI/Abdominal exam: PRESENT: soft, tenderness - suprapubic and both lower 

quadrants


Rectal exam: PRESENT: deferred


Extremities exam: PRESENT: full ROM


Musculoskeletal exam: PRESENT: ambulatory


Neurological exam: PRESENT: alert, oriented to person, oriented to place, 

oriented to time, oriented to situation


Psychiatric exam: PRESENT: appropriate affect


Skin exam: PRESENT: normal color, warm





Results


Laboratory Results: 


                                        





                                 02/06/19 16:00 





                                 02/06/19 16:00 





                                        











  02/06/19 02/06/19 02/06/19





  16:00 16:00 16:00


 


WBC  10.9 H  


 


RBC  3.23 L  


 


Hgb  10.6 L  


 


Hct  30.8 L  


 


MCV  96  D  


 


MCH  32.7  


 


MCHC  34.2  


 


RDW  19.6 H  


 


Plt Count  263  


 


Seg Neutrophils %  Not Reportable  


 


Lymphocytes %  Not Reportable  


 


Monocytes %  Not Reportable  


 


Eosinophils %  Not Reportable  


 


Basophils %  Not Reportable  


 


Absolute Neutrophils  Not Reportable  


 


Absolute Lymphocytes  Not Reportable  


 


Absolute Monocytes  Not Reportable  


 


Absolute Eosinophils  Not Reportable  


 


Absolute Basophils  Not Reportable  


 


Sodium   134.2 L 


 


Potassium   4.5 


 


Chloride   93 L 


 


Carbon Dioxide   32 H 


 


Anion Gap   9 


 


BUN   27 H 


 


Creatinine   0.51 L 


 


Est GFR ( Amer)   > 60 


 


Est GFR (Non-Af Amer)   > 60 


 


Glucose   101 


 


Lactic Acid    1.8


 


Calcium   8.4 


 


Total Bilirubin   2.0 H 


 


AST   149 H 


 


ALT   234 H 


 


Alkaline Phosphatase   847 H 


 


Total Protein   5.3 L 


 


Albumin   2.9 L 











Impressions: 


                                        





Abdomen/Pelvis CT  02/06/19 00:00


IMPRESSION:  Diverticulitis of the descending colon and sigmoid colon.  No 

definitive abscess or perforation.


Pigtail catheter in the lower abdomen.


 














Assessment & Plan





- Diagnosis


(1) Diverticulitis large intestine


Is this a current diagnosis for this admission?: Yes   





(2) Abdominal abscess


Is this a current diagnosis for this admission?: Yes   





- Time


Time Spent: 30 to 50 Minutes





- Inpatient Certification


Medical Necessity: Need For IV Fluids, Need for IV Antibiotics





- Plan Summary


Plan Summary: 





Start IV antibiotics and bowel rest


Repeat labs in am

## 2019-02-06 NOTE — ER DOCUMENT REPORT
ED General





- General


Chief Complaint: Abdominal Pain


Stated Complaint: ABDOMINAL PAIN


Time Seen by Provider: 02/06/19 14:04


TRAVEL OUTSIDE OF THE [place] IN LAST 30 DAYS: No





- HPI


Notes: 





Patient is a 58-year-old female that presents to the emergency department for 

chief complaint of abdominal pain.


Patient reports a diffuse lower abdominal pain.  She states it is sharp when you

touch it and dull and achy when not touching it.  The pain is constant and has 

been present for the last few weeks.  Patient did have a recent percutaneous 

drain placed for abscess.  She was in Dr. Thornton's office today and referred to 

the emergency room for concern of re-development of the abscess.  She reports 

some nausea but denies any vomiting.  She denies fevers and chills.  She is not 

currently on antibiotics.  She denies any difficulty breathing and dysuria.  

Patient does endorse some diarrhea stating she has had 5 loose stools daily.





Past Medical History: Ulcerative colitis





Past Surgical History: Percutaneous pelvic abscess drain





Social History: Reviewed in chart





Family History: Reviewed and noncontributory for presenting illness





Allergies: Reviewed, see documented allergy list.








REVIEW OF SYSTEMS:





CONSTITUTIONAL : 





No fever





No chills





No diaphoresis





No recent illness





EENT:





No vision changes





No congestion





No sore throat  





CARDIOVASCULAR:





No chest pain





No palpitations





RESPIRATORY:





No shortness of breath





No cough





No difficulty breathing





GASTROINTESTINAL: 





 abdominal pain





 nausea





No vomiting





diarrhea





GENITOURINARY:





No dysuria





No hematuria





No difficulty urinating





MUSCULOSKELETAL:





No back pain





No leg pain





No arm pain





SKIN:  





No rashes





No lesions





LYMPHATIC: 





No swollen, enlarged glands.





NEUROLOGICAL: 





No lightheadedness





No headache





No weakness





No paresthesias





PSYCHIATRIC:





No anxiety





No depression 








PHYSICAL EXAMINATION:





Vital signs reviewed, nursing noted reviewed.





GENERAL: Well-appearing, well-nourished and in no acute distress.





HEAD: Atraumatic, normocephalic.





EYES: Eyes appear normal, extraocular movements intact, sclera anicteric, 

conjunctiva are normal.





ENT: nares patent, oropharynx clear without exudates.  Moist mucous membranes.





NECK: Normal range of motion, supple without lymphadenopathy





LUNGS: Breath sounds clear to auscultation bilaterally and equal.  No wheezes 

rales or rhonchi.





HEART: Regular rate and rhythm without murmurs





ABDOMEN: Soft, diffuse lower tenderness, percutaneous drain with malodorous 

purulent appearing discharge, no rebound, guarding, or rigidity. No masses 

appreciated.





EXTREMITIES: Nontender, good range of motion, no pitting or edema. 





NEUROLOGICAL: No focal neurological deficits. Moves all extremities 

spontaneously Motor and sensory grossly intact on exam.





PSYCH: Normal mood, normal affect.





SKIN: Warm, Dry, normal turgor, no rashes or lesions noted on exposed skin











- Related Data


Allergies/Adverse Reactions: 


                                        





pineapple Allergy (Mild, Verified 02/06/19 12:14)


   


latex [Latex] Allergy (Verified 02/06/19 12:14)


   itching


nickel [Nickel] Allergy (Verified 02/06/19 12:14)


   ITCHY RASH


sulfamethoxazole [From Bactrim] Allergy (Verified 02/06/19 12:14)


   red rash


trimethoprim [From Bactrim] Allergy (Verified 02/06/19 12:14)


   red rash


MERCURY Allergy (Uncoded 02/06/19 12:14)


   ITCHY RASH











Past Medical History





- Social History


Smoking Status: Former Smoker


Chew tobacco use (# tins/day): No


Frequency of alcohol use: None


Drug Abuse: None


Family History: Reviewed & Not Pertinent


Patient has suicidal ideation: No


Patient has homicidal ideation: No





- Past Medical History


Cardiac Medical History: 


   Denies: Hx Coronary Artery Disease, Hx Heart Attack, Hx Hypertension


Pulmonary Medical History: 


   Denies: Hx Asthma, Hx Bronchitis, Hx COPD, Hx Pneumonia


Neurological Medical History: Denies: Hx Cerebrovascular Accident, Hx Seizures


Renal/ Medical History: Denies: Hx Peritoneal Dialysis


GI Medical History: Denies: Hx Hepatitis, Hx Hiatal Hernia, Hx Ulcer


Musculoskeletal Medical History: Reports Hx Arthritis - psoriatic, Reports Hx 

Musculoskeletal Trauma - Right knee fracture 10 years ago


Skin Medical History: Reports Hx Psoriasis


Psychiatric Medical History: Reports: Hx Depression


Infectious Medical History: Denies: Hx Hepatitis


Past Surgical History: Reports: Hx Abdominal Surgery - hernia, Hx Breast Surgery

- right breast lump removed, Hx Cholecystectomy, Hx Hysterectomy, Hx Orthopedic 

Surgery - right knee, left hand.  Denies: Hx Mastectomy, Hx Open Heart Surgery





- Immunizations


Hx Diphtheria, Pertussis, Tetanus Vaccination: Yes





Physical Exam





- Vital signs


Vitals: 


                                        











Temp Pulse Resp BP Pulse Ox


 


 97.9 F   100   20   102/69   99 


 


 02/06/19 12:31  02/06/19 12:31  02/06/19 12:31  02/06/19 12:31  02/06/19 12:31














Course





- Re-evaluation


Re-evalutation: 





02/06/19 15:52


Vitals reviewed.  Nursing notes reviewed.  Patient offered pain medication but 

declined.  She does have some abdominal tenderness and CT scan will be ordered 

to evaluate for new development of abscess.








02/06/19 16:20


Patient's care discussed with Dr. Thornton who referred her to the emergency room.

 He feels the percutaneous drain has not been placed on suction and believe she 

likely has reaccumulation of the abscess requiring further surgical management. 

He is not on call for inpatient surgical services.  He confirms she is not 

currently on antibiotics.


02/06/19 16:29


Patient's care was discussed with Dr. Mcgill who will admit her to the 

hospital.  Patient started on Zosyn.  Lab work and CT scan are currently 

pending.


02/06/19 17:14





Laboratory











  02/06/19 02/06/19 02/06/19





  16:00 16:00 16:00


 


WBC  10.9 H  


 


RBC  3.23 L  


 


Hgb  10.6 L  


 


Hct  30.8 L  


 


MCV  96  D  


 


MCH  32.7  


 


MCHC  34.2  


 


RDW  19.6 H  


 


Plt Count  263  


 


Total Counted  100  


 


Seg Neutrophils %  Not Reportable  


 


Seg Neuts % (Manual)  70  


 


Band Neutrophils %  27 H  


 


Lymphocytes %  Not Reportable  


 


Lymphocytes % (Manual)  3 L  


 


Monocytes %  Not Reportable  


 


Monocytes % (Manual)  0 L  


 


Eosinophils %  Not Reportable  


 


Eosinophils % (Manual)  0  


 


Basophils %  Not Reportable  


 


Basophils % (Manual)  0  


 


Absolute Neutrophils  Not Reportable  


 


Abs Neuts (Manual)  10.6 H  


 


Absolute Lymphocytes  Not Reportable  


 


Abs Lymphs (Manual)  0.3 L  


 


Absolute Monocytes  Not Reportable  


 


Abs Monocytes (Manual)  0.0 L  


 


Absolute Eosinophils  Not Reportable  


 


Absolute Eos (Manual)  0.0  


 


Absolute Basophils  Not Reportable  


 


Abs Basophils (Manual)  0.0  


 


Large Platelets  PRESENT  


 


Platelet Comment  ADEQUATE  


 


Hypochromasia  SLIGHT  


 


Stomatocytes  SLIGHT  


 


Sodium   134.2 L 


 


Potassium   4.5 


 


Chloride   93 L 


 


Carbon Dioxide   32 H 


 


Anion Gap   9 


 


BUN   27 H 


 


Creatinine   0.51 L 


 


Est GFR ( Amer)   > 60 


 


Est GFR (Non-Af Amer)   > 60 


 


Glucose   101 


 


Lactic Acid    1.8


 


Calcium   8.4 


 


Total Bilirubin   2.0 H 


 


Direct Bilirubin   1.1 H 


 


Neonat Total Bilirubin   Not Reportable 


 


Neonat Direct Bilirubin   Not Reportable 


 


Neonat Indirect Bili   Not Reportable 


 


AST   149 H 


 


ALT   234 H 


 


Alkaline Phosphatase   847 H 


 


Total Protein   5.3 L 


 


Albumin   2.9 L 














- Vital Signs


Vital signs: 


                                        











Temp Pulse Resp BP Pulse Ox


 


 97.9 F   100   14   106/50 L  99 


 


 02/06/19 12:31  02/06/19 12:31  02/06/19 16:10  02/06/19 16:10  02/06/19 16:10














- Laboratory


Result Diagrams: 


                                 02/06/19 16:00





                                 02/06/19 16:00


Laboratory results interpreted by me: 


                                        











  02/06/19 02/06/19





  16:00 16:00


 


WBC  10.9 H 


 


RBC  3.23 L 


 


Hgb  10.6 L 


 


Hct  30.8 L 


 


RDW  19.6 H 


 


Band Neutrophils %  27 H 


 


Lymphocytes % (Manual)  3 L 


 


Monocytes % (Manual)  0 L 


 


Abs Neuts (Manual)  10.6 H 


 


Abs Lymphs (Manual)  0.3 L 


 


Abs Monocytes (Manual)  0.0 L 


 


Sodium   134.2 L


 


Chloride   93 L


 


Carbon Dioxide   32 H


 


BUN   27 H


 


Creatinine   0.51 L


 


Total Bilirubin   2.0 H


 


Direct Bilirubin   1.1 H


 


AST   149 H


 


ALT   234 H


 


Alkaline Phosphatase   847 H


 


Total Protein   5.3 L


 


Albumin   2.9 L














Discharge





- Discharge


Clinical Impression: 


 Pelvic pain





Abdominal pain


Qualifiers:


 Abdominal location: lower abdomen, unspecified Qualified Code(s): R10.30 - L

ower abdominal pain, unspecified





Condition: Stable


Disposition: ADMITTED AS INPATIENT


Admitting Provider: Surgicalist


Unit Admitted: Surgical Floor

## 2019-02-06 NOTE — ER DOCUMENT REPORT
ED Medical Screen (RME)





- General


Chief Complaint: Abdominal Pain


Stated Complaint: ABDOMINAL PAIN


Time Seen by Provider: 02/06/19 14:04


Primary Care Provider: 


DEBBIE GUTIERREZ DO [Primary Care Provider] - Follow up as needed


Notes: 





58-year-old female with ulcerative colitis, pulmonary fibrosis, glaucoma, 

rheumatoid arthritis, headaches presents from Dr. Thornton's office with concern 

for possible abdominal abscess.  Patient with an existing percutaneous drain in 

the lower abdomen placed for a previous abscess.  Patient has had symptoms for 

approximately 4 days and complains of nausea without vomiting, chills with no 

fever, and mild generalized abdominal pain.  Of note patient reports that she 

has an existing sacral decubitus ulcer.





I have greeted and performed a rapid initial assessment of this patient.  A 

comprehensive ED assessment and evaluation of the patient, analysis of test 

results and completion of medical decision making process will be conducted by 

an additional ED providers.


TRAVEL OUTSIDE OF THE [place] IN LAST 30 DAYS: No





- Related Data


Allergies/Adverse Reactions: 


                                        





pineapple Allergy (Mild, Verified 02/06/19 12:14)


   


latex [Latex] Allergy (Verified 02/06/19 12:14)


   itching


nickel [Nickel] Allergy (Verified 02/06/19 12:14)


   ITCHY RASH


sulfamethoxazole [From Bactrim] Allergy (Verified 02/06/19 12:14)


   red rash


trimethoprim [From Bactrim] Allergy (Verified 02/06/19 12:14)


   red rash


MERCURY Allergy (Uncoded 02/06/19 12:14)


   ITCHY RASH











Past Medical History





- Social History


Chew tobacco use (# tins/day): No


Frequency of alcohol use: None


Drug Abuse: None





- Past Medical History


Cardiac Medical History: 


   Denies: Hx Coronary Artery Disease, Hx Heart Attack, Hx Hypertension


Pulmonary Medical History: 


   Denies: Hx Asthma, Hx Bronchitis, Hx COPD, Hx Pneumonia


Neurological Medical History: Denies: Hx Cerebrovascular Accident, Hx Seizures


Renal/ Medical History: Denies: Hx Peritoneal Dialysis


GI Medical History: Denies: Hx Hepatitis, Hx Hiatal Hernia, Hx Ulcer


Musculoskeltal Medical History: Reports Hx Arthritis - psoriatic, Reports Hx 

Musculoskeletal Trauma - Right knee fracture 10 years ago


Skin Medical History: Reports Hx Psoriasis


Psychiatric Medical History: Reports: Hx Depression


Infectious Medical History: Denies: Hx Hepatitis


Past Surgical History: Reports: Hx Abdominal Surgery - hernia, Hx Breast Surgery

- right breast lump removed, Hx Cholecystectomy, Hx Hysterectomy, Hx Orthopedic 

Surgery - right knee, left hand.  Denies: Hx Mastectomy, Hx Open Heart Surgery





- Immunizations


Hx Diphtheria, Pertussis, Tetanus Vaccination: Yes





Physical Exam





- Vital signs


Vitals: 





                                        











Temp Pulse Resp BP Pulse Ox


 


 97.9 F   100   20   102/69   99 


 


 02/06/19 12:31  02/06/19 12:31  02/06/19 12:31  02/06/19 12:31  02/06/19 12:31














- Abdominal


Inspection: No: Normal - Existing suprapubic percutaneous drain with stool 

leaking out around it saturating a Telfa dressing.


Distension: No distension


Bowel sounds: Normal


Tenderness: Tender - Generalized tenderness to palpation





Course





- Vital Signs


Vital signs: 





                                        











Temp Pulse Resp BP Pulse Ox


 


 97.9 F   100   20   102/69   99 


 


 02/06/19 12:31  02/06/19 12:31  02/06/19 12:31  02/06/19 12:31  02/06/19 12:31














Doctor's Discharge





- Discharge


Referrals: 


DEBBIE GUTIERREZ DO [Primary Care Provider] - Follow up as needed

## 2019-02-06 NOTE — RADIOLOGY REPORT (SQ)
EXAM DESCRIPTION:  CT ABD/PELVIS ORAL ONLY



COMPLETED DATE/TIME:  2/6/2019 7:08 pm



REASON FOR STUDY:  abdominal pain



COMPARISON:  1/21/2019



TECHNIQUE:  CT scan of the abdomen and pelvis performed with oral contrast and no intravenous contras
t. Images reviewed with lung, soft tissue, and bone windows. Reconstructed coronal and sagittal MPR i
mages reviewed. All images stored on PACS.

All CT scanners at this facility use dose modulation, iterative reconstruction, and/or weight based d
osing when appropriate to reduce radiation dose to as low as reasonably achievable (ALARA).

[facility]: Dose Right  [facility]: CareDose    [facility]: Dose Right    [facility]: Teradose 4D    [facility]: Smart Synthetic Biologics



RADIATION DOSE:  CT Rad equipment meets quality standard of care and radiation dose reduction techniq
ues were employed. CTDIvol: 12.2 mGy. DLP: 714 mGy-cm. mGy.



LIMITATIONS:  None.



FINDINGS:  LOWER CHEST: No significant findings. No nodules or infiltrates.

NON-CONTRASTED LIVER, SPLEEN, ADRENALS: Evaluation limited by lack of IV contrast. No identified sign
ificant masses.

PANCREAS: No masses. No peripancreatic inflammatory changes.

GALLBLADDER: Surgically absent.

RIGHT KIDNEY AND URETER: No suspicious masses. Assessment limited by lack of IV contrast.   No signif
icant calcifications.   No hydronephrosis or hydroureter.

LEFT KIDNEY AND URETER: No suspicious masses. Assessment limited by lack of IV contrast.   No signifi
cant calcifications.   No hydronephrosis or hydroureter.

AORTA AND RETROPERITONEUM: No aneurysm. No retroperitoneal masses or adenopathy.

BOWEL AND PERITONEAL CAVITY: Inflammatory changes in the sigmoid colon with pericolonic fat stranding
.  Extends along the descending colon.  Associated with diverticular.  No definitive perforation or a
bscess.

APPENDIX: Not visualized.

PELVIS, BLADDER, AND ABDOMINAL WALL: No abnormal pelvic masses. No abdominal wall hernias. Bladder un
remarkable.

BONES: No significant findings.

OTHER: Pigtail catheter remains in place.  Of undetermined reason.



IMPRESSION:  Diverticulitis of the descending colon and sigmoid colon.  No definitive abscess or perf
oration.

Pigtail catheter in the lower abdomen.



TECHNICAL DOCUMENTATION:  JOB ID:  [id number]

Quality ID # 436: Final reports with documentation of one or more dose reduction techniques (e.g., Au
tomated exposure control, adjustment of the mA and/or kV according to patient size, use of iterative 
reconstruction technique)

 2011 [employer]- All Rights Reserved



Reading location - IP/workstation name: [email or web address]

## 2019-02-07 LAB
ABSOLUTE LYMPHOCYTES# (MANUAL): 0.8 10^3/UL (ref 0.5–4.7)
ABSOLUTE MONOCYTES # (MANUAL): 0.4 10^3/UL (ref 0.1–1.4)
ABSOLUTE NEUTROPHILS# (MANUAL): 5.6 10^3/UL (ref 1.7–8.2)
ADD MANUAL DIFF: YES
ALBUMIN SERPL-MCNC: 2 G/DL (ref 3.5–5)
ALP SERPL-CCNC: 450 U/L (ref 38–126)
ALT SERPL-CCNC: 146 U/L (ref 9–52)
ANION GAP SERPL CALC-SCNC: 8 MMOL/L (ref 5–19)
APPEARANCE UR: (no result)
APTT PPP: (no result) S
AST SERPL-CCNC: 63 U/L (ref 14–36)
BASOPHILS NFR BLD MANUAL: 0 % (ref 0–2)
BILIRUB DIRECT SERPL-MCNC: 0.4 MG/DL (ref 0–0.4)
BILIRUB SERPL-MCNC: 1 MG/DL (ref 0.2–1.3)
BILIRUB UR QL STRIP: NEGATIVE
BUN SERPL-MCNC: 18 MG/DL (ref 7–20)
CALCIUM: 7.5 MG/DL (ref 8.4–10.2)
CHLORIDE SERPL-SCNC: 97 MMOL/L (ref 98–107)
CO2 SERPL-SCNC: 28 MMOL/L (ref 22–30)
EOSINOPHIL NFR BLD MANUAL: 0 % (ref 0–6)
ERYTHROCYTE [DISTWIDTH] IN BLOOD BY AUTOMATED COUNT: 19.5 % (ref 11.5–14)
GLUCOSE SERPL-MCNC: 55 MG/DL (ref 75–110)
GLUCOSE UR STRIP-MCNC: NEGATIVE MG/DL
HCT VFR BLD CALC: 23.3 % (ref 36–47)
HGB BLD-MCNC: 8 G/DL (ref 12–15.5)
KETONES UR STRIP-MCNC: (no result) MG/DL
LIPASE SERPL-CCNC: 13.6 U/L (ref 23–300)
MACROCYTES BLD QL SMEAR: (no result)
MCH RBC QN AUTO: 32.4 PG (ref 27–33.4)
MCHC RBC AUTO-ENTMCNC: 34.2 G/DL (ref 32–36)
MCV RBC AUTO: 95 FL (ref 80–97)
MONOCYTES % (MANUAL): 6 % (ref 3–13)
NEUTS BAND NFR BLD MANUAL: 4 % (ref 3–5)
NITRITE UR QL STRIP: NEGATIVE
NRBC BLD AUTO-RTO: 1 /100 WBC
PATH REV BLD -IMP: (no result)
PH UR STRIP: 5 [PH] (ref 5–9)
PLATELET # BLD: 197 10^3/UL (ref 150–450)
PLATELET COMMENT: ADEQUATE
POTASSIUM SERPL-SCNC: 3.6 MMOL/L (ref 3.6–5)
PROT SERPL-MCNC: 3.8 G/DL (ref 6.3–8.2)
PROT UR STRIP-MCNC: 30 MG/DL
RBC # BLD AUTO: 2.46 10^6/UL (ref 3.72–5.28)
SEGMENTED NEUTROPHILS % (MAN): 78 % (ref 42–78)
SODIUM SERPL-SCNC: 133 MMOL/L (ref 137–145)
SP GR UR STRIP: 1.03
TOTAL CELLS COUNTED BLD: 100
TOXIC GRANULES BLD QL SMEAR: SLIGHT
UROBILINOGEN UR-MCNC: NEGATIVE MG/DL (ref ?–2)
VARIANT LYMPHS NFR BLD MANUAL: 12 % (ref 13–45)
WBC # BLD AUTO: 6.8 10^3/UL (ref 4–10.5)

## 2019-02-07 RX ADMIN — Medication SCH: at 08:02

## 2019-02-07 RX ADMIN — ENOXAPARIN SODIUM SCH MG: 40 INJECTION SUBCUTANEOUS at 09:50

## 2019-02-07 RX ADMIN — PIPERACILLIN AND TAZOBACTAM SCH MLS/HR: 3; .375 INJECTION, POWDER, LYOPHILIZED, FOR SOLUTION INTRAVENOUS; PARENTERAL at 06:42

## 2019-02-07 RX ADMIN — PIPERACILLIN AND TAZOBACTAM SCH MLS/HR: 3; .375 INJECTION, POWDER, LYOPHILIZED, FOR SOLUTION INTRAVENOUS; PARENTERAL at 01:00

## 2019-02-07 RX ADMIN — PIPERACILLIN AND TAZOBACTAM SCH MLS/HR: 3; .375 INJECTION, POWDER, LYOPHILIZED, FOR SOLUTION INTRAVENOUS; PARENTERAL at 12:43

## 2019-02-07 RX ADMIN — Medication SCH: at 14:19

## 2019-02-07 RX ADMIN — PIPERACILLIN AND TAZOBACTAM SCH MLS/HR: 3; .375 INJECTION, POWDER, LYOPHILIZED, FOR SOLUTION INTRAVENOUS; PARENTERAL at 19:31

## 2019-02-07 NOTE — PDOC PROGRESS REPORT
Subjective


Progress Note for:: 02/07/19


Reason For Visit: 


DIVERTICULITIS,SIGMOID AND DESCENDING COLON,INTRA-








Physical Exam


Vital Signs: 


                                        











Temp Pulse Resp BP Pulse Ox


 


 98.3 F   84   16   130/68 H  98 


 


 02/07/19 08:05  02/07/19 08:05  02/07/19 08:05  02/07/19 08:05  02/07/19 08:05








                                 Intake & Output











 02/06/19 02/07/19 02/08/19





 06:59 06:59 06:59


 


Intake Total  100 


 


Output Total  200 


 


Balance  -100 


 


Weight  72.9 kg 











General appearance: PRESENT: no acute distress


Mouth exam: PRESENT: dry mucosa, moist


Cardiovascular exam: PRESENT: RRR


GI/Abdominal exam: PRESENT: other - abd soft, min tenderness,suprapubic isidro drain

in llq with stool draining into bulb


Extremities exam: PRESENT: full ROM


Musculoskeletal exam: PRESENT: full ROM


Neurological exam: PRESENT: alert, awake, oriented to person, oriented to place,

oriented to time, oriented to situation





Results


Laboratory Results: 


                                        





                                 02/07/19 05:20 





                                 02/07/19 05:20 





                                        











  02/06/19 02/06/19 02/06/19





  16:00 16:00 16:00


 


WBC  10.9 H  


 


RBC  3.23 L  


 


Hgb  10.6 L  


 


Hct  30.8 L  


 


MCV  96  D  


 


MCH  32.7  


 


MCHC  34.2  


 


RDW  19.6 H  


 


Plt Count  263  


 


Seg Neutrophils %  Not Reportable  


 


Lymphocytes %  Not Reportable  


 


Monocytes %  Not Reportable  


 


Eosinophils %  Not Reportable  


 


Basophils %  Not Reportable  


 


Absolute Neutrophils  Not Reportable  


 


Absolute Lymphocytes  Not Reportable  


 


Absolute Monocytes  Not Reportable  


 


Absolute Eosinophils  Not Reportable  


 


Absolute Basophils  Not Reportable  


 


Sodium   134.2 L 


 


Potassium   4.5 


 


Chloride   93 L 


 


Carbon Dioxide   32 H 


 


Anion Gap   9 


 


BUN   27 H 


 


Creatinine   0.51 L 


 


Est GFR ( Amer)   > 60 


 


Est GFR (Non-Af Amer)   > 60 


 


Glucose   101 


 


Lactic Acid    1.8


 


Calcium   8.4 


 


Total Bilirubin   2.0 H 


 


AST   149 H 


 


ALT   234 H 


 


Alkaline Phosphatase   847 H 


 


Total Protein   5.3 L 


 


Albumin   2.9 L 


 


Lipase   


 


Urine Color   


 


Urine Appearance   


 


Urine pH   


 


Ur Specific Gravity   


 


Urine Protein   


 


Urine Glucose (UA)   


 


Urine Ketones   


 


Urine Blood   


 


Urine Nitrite   


 


Ur Leukocyte Esterase   


 


Urine WBC (Auto)   


 


Urine RBC (Auto)   














  02/07/19 02/07/19 02/07/19





  04:50 05:20 05:20


 


WBC   6.8 


 


RBC   2.46 L 


 


Hgb   8.0 L D 


 


Hct   23.3 L 


 


MCV   95 


 


MCH   32.4 


 


MCHC   34.2 


 


RDW   19.5 H 


 


Plt Count   197 


 


Seg Neutrophils %   Not Reportable 


 


Lymphocytes %   Not Reportable 


 


Monocytes %   Not Reportable 


 


Eosinophils %   Not Reportable 


 


Basophils %   Not Reportable 


 


Absolute Neutrophils   Not Reportable 


 


Absolute Lymphocytes   Not Reportable 


 


Absolute Monocytes   Not Reportable 


 


Absolute Eosinophils   Not Reportable 


 


Absolute Basophils   Not Reportable 


 


Sodium    133.0 L


 


Potassium    3.6


 


Chloride    97 L


 


Carbon Dioxide    28


 


Anion Gap    8


 


BUN    18


 


Creatinine    0.27 L


 


Est GFR ( Amer)    > 60


 


Est GFR (Non-Af Amer)    > 60


 


Glucose    55 L


 


Lactic Acid   


 


Calcium    7.5 L


 


Total Bilirubin    1.0


 


AST    63 H


 


ALT    146 H


 


Alkaline Phosphatase    450 H


 


Total Protein    3.8 L


 


Albumin    2.0 L


 


Lipase    13.6 L


 


Urine Color  CHLOÉ  


 


Urine Appearance  CLOUDY  


 


Urine pH  5.0  


 


Ur Specific Gravity  1.031  


 


Urine Protein  30 H  


 


Urine Glucose (UA)  NEGATIVE  


 


Urine Ketones  TRACE H  


 


Urine Blood  LARGE H  


 


Urine Nitrite  NEGATIVE  


 


Ur Leukocyte Esterase  LARGE H  


 


Urine WBC (Auto)  173  


 


Urine RBC (Auto)  10  











Impressions: 


                                        





Abdomen/Pelvis CT  02/06/19 00:00


IMPRESSION:  Diverticulitis of the descending colon and sigmoid colon.  No 

definitive abscess or perforation.


Pigtail catheter in the lower abdomen.


 








Chest X-Ray  02/06/19 00:00


IMPRESSION:


 


Subsegmental atelectasis left lung base. Mild elevation left


hemidiaphragm


 


 


copyright 2011 [employer]- All Rights Reserved


 








Abdomen Ultrasound  02/07/19 00:00


IMPRESSION:


 


No acute sonographic abnormality.


 














Assessment & Plan





- Inpatient Certification


Medical Necessity: Need for IV Antibiotics





- Plan Summary


Plan Summary: 





pt readmitted yesteday with dx of diverticulitis


she has an indwelling drain in place that was


placed in [facility] for pelvic abscess.


etiol for abscess is ?ulcerative colitis/crohns ???


it is unclear about her dx.


currently her drain appears to be putting out stool


her ct scan does not show evidence of fluid miguel ángel or abscess


plan will review her colonoscopy reports and


see if pt does infact have ulcerative colitis


with perforation vs 


perforated diverticulitis


she may benifit from a dye study via the 


isidro tube to see if there is a fistula 


she may infact need a colectomy to


ultimately take care of the problem


I will review her recent colo reports.

## 2019-02-07 NOTE — RADIOLOGY REPORT (SQ)
EXAM DESCRIPTION: 



US ABDOMEN



COMPLETED DATE/TME:  02/07/2019 00:00



CLINICAL HISTORY: 



58 years, Female, elevated LFTs



Comparison: CT February 6, 2019



Grayscale and Doppler sonogram of the abdomen.



FINDINGS:

Pancreas:  Not visualized due to overlying bowel gas.

Aorta:  Not visualized due to overlying bowel gas.

IVC:  Visualized portion is unremarkable.

Liver:  Homogenous echotexture. 

Main portal vein:  Normal directional flow. 

Gallbladder: Cholecystectomy..

Common bile duct: Diameter: 0.4 cm. 

Right kidney:   11.1 cm. No hydronephrosis. No nephrolithiasis.

Left kidney:   12.2 cm. No hydronephrosis. No nephrolithiasis. 

Spleen:  11.2 cm. Unremarkable 



IMPRESSION:



No acute sonographic abnormality.

## 2019-02-08 RX ADMIN — Medication SCH ML: at 05:29

## 2019-02-08 RX ADMIN — ENOXAPARIN SODIUM SCH MG: 40 INJECTION SUBCUTANEOUS at 10:30

## 2019-02-08 RX ADMIN — PIPERACILLIN AND TAZOBACTAM SCH MLS/HR: 3; .375 INJECTION, POWDER, LYOPHILIZED, FOR SOLUTION INTRAVENOUS; PARENTERAL at 05:27

## 2019-02-08 RX ADMIN — Medication SCH ML: at 00:02

## 2019-02-08 RX ADMIN — PIPERACILLIN AND TAZOBACTAM SCH MLS/HR: 3; .375 INJECTION, POWDER, LYOPHILIZED, FOR SOLUTION INTRAVENOUS; PARENTERAL at 00:01

## 2019-02-08 RX ADMIN — PIPERACILLIN AND TAZOBACTAM SCH MLS/HR: 3; .375 INJECTION, POWDER, LYOPHILIZED, FOR SOLUTION INTRAVENOUS; PARENTERAL at 12:57

## 2019-02-08 RX ADMIN — Medication SCH: at 21:05

## 2019-02-08 RX ADMIN — Medication SCH: at 15:26

## 2019-02-08 RX ADMIN — PIPERACILLIN AND TAZOBACTAM SCH MLS/HR: 3; .375 INJECTION, POWDER, LYOPHILIZED, FOR SOLUTION INTRAVENOUS; PARENTERAL at 17:56

## 2019-02-08 NOTE — RADIOLOGY REPORT (SQ)
EXAM DESCRIPTION:  **NOT FOR OR** FLUORO TO 1 HR; INJECT PREV PLACED CATHETER



COMPLETED DATE/TIME:  2/8/2019 12:48 pm; 2/8/2019 1:04 pm



REASON FOR STUDY:  fistulogram; FISTULOGRAM VIA DRAIN

fistulogram; FISTULOGRAM VIA DRAINPelvic abscess follow-up.  Fecal material draining from abscess helder
inage tube.



COMPARISON:  CT abdomen and pelvis 2/6/2019 and 1/21/2019



FLUOROSCOPY TIME:  21 seconds of fluoroscopy was used.

6 Images saved to PACS.



TECHNIQUE:  Injection of contrast through existing catheter.  Selected fluoroscopic images saved to P
[facility].



LIMITATIONS:  None.



FINDINGS:  CONTRAST INJECTED: 40 mL Omnipaque 350

TUBE POSITION: Contrast injected into the abscess drainage catheter demonstrates small residual absce
ss cavity.  There is a fistulous communication with the distal descending colon with contrast extendi
ng up to the splenic flexure.  Visualized colon appears nodular and edematous, worrisome for ulcerati
ve colitis.. Abscess drainage catheter is patent and in proper position.



IMPRESSION:  SMALL RESIDUAL ABSCESS CAVITY.  FISTULOUS COMMUNICATION WITH THE DISTAL DESCENDING COLON
 WITH COLONIC FINDINGS AS DESCRIBED ABOVE.



COMMENT:  Findings were discussed with dr. Mcgill on 2/8/2019 at 1300 hours.

Quality :  Final reports for procedures using fluoroscopy that document radiation exposure alma
grant, or exposure time and number of fluorographic images (if radiation exposure indices are not avail
able)



TECHNICAL DOCUMENTATION:  JOB ID:  [id number]

 2011 [employer]- All Rights Reserved



Reading location - IP/workstation name: [email or web address] [id number]

## 2019-02-08 NOTE — RADIOLOGY REPORT (SQ)
EXAM DESCRIPTION:  **NOT FOR OR** FLUORO TO 1 HR; INJECT PREV PLACED CATHETER



COMPLETED DATE/TIME:  2/8/2019 12:48 pm; 2/8/2019 1:04 pm



REASON FOR STUDY:  fistulogram; FISTULOGRAM VIA DRAIN

fistulogram; FISTULOGRAM VIA DRAINPelvic abscess follow-up.  Fecal material draining from abscess helder
inage tube.



COMPARISON:  CT abdomen and pelvis 2/6/2019 and 1/21/2019



FLUOROSCOPY TIME:  21 seconds of fluoroscopy was used.

6 Images saved to PACS.



TECHNIQUE:  Injection of contrast through existing catheter.  Selected fluoroscopic images saved to P
[facility].



LIMITATIONS:  None.



FINDINGS:  CONTRAST INJECTED: 40 mL Omnipaque 350

TUBE POSITION: Contrast injected into the abscess drainage catheter demonstrates small residual absce
ss cavity.  There is a fistulous communication with the distal descending colon with contrast extendi
ng up to the splenic flexure.  Visualized colon appears nodular and edematous, worrisome for ulcerati
ve colitis.. Abscess drainage catheter is patent and in proper position.



IMPRESSION:  SMALL RESIDUAL ABSCESS CAVITY.  FISTULOUS COMMUNICATION WITH THE DISTAL DESCENDING COLON
 WITH COLONIC FINDINGS AS DESCRIBED ABOVE.



COMMENT:  Findings were discussed with dr. Mcgill on 2/8/2019 at 1300 hours.

Quality :  Final reports for procedures using fluoroscopy that document radiation exposure alma
grant, or exposure time and number of fluorographic images (if radiation exposure indices are not avail
able)



TECHNICAL DOCUMENTATION:  JOB ID:  [id number]

 2011 [employer]- All Rights Reserved



Reading location - IP/workstation name: [email or web address] [id number]

## 2019-02-08 NOTE — PDOC PROGRESS REPORT
Subjective


Progress Note for:: 02/08/19


Subjective:: 





mild pains around catheter and suprapiubic area


Reason For Visit: 


DIVERTICULITIS,SIGMOID AND DESCENDING COLON,INTRA-








Physical Exam


Vital Signs: 


                                        











Temp Pulse Resp BP Pulse Ox


 


 98.2 F   71   16   139/71 H  99 


 


 02/08/19 12:00  02/08/19 12:00  02/08/19 12:00  02/08/19 12:00  02/08/19 12:00








                                 Intake & Output











 02/07/19 02/08/19 02/09/19





 06:59 06:59 06:59


 


Intake Total 100 500 


 


Output Total 200 90 


 


Balance -100 410 


 


Weight 72.9 kg 74.4 kg 











Exam: 





Abdomen is soft with mild tenderness suprapubic and around the catheter





Results


Laboratory Results: 


                                        





                                 02/07/19 05:20 





                                 02/07/19 05:20 





                                        





02/07/19 01:40   Abdomen - Abscess   Gram Stain - Final








Impressions: 


                                        





Abdomen/Pelvis CT  02/06/19 00:00


IMPRESSION:  Diverticulitis of the descending colon and sigmoid colon.  No 

definitive abscess or perforation.


Pigtail catheter in the lower abdomen.


 








Chest X-Ray  02/06/19 00:00


IMPRESSION:


 


Subsegmental atelectasis left lung base. Mild elevation left


hemidiaphragm


 


 


copyright 2011 [employer]- All Rights Reserved


 








Abdomen Ultrasound  02/07/19 00:00


IMPRESSION:


 


No acute sonographic abnormality.


 








Catheter Patency X-Ray  02/08/19 00:00


IMPRESSION:  SMALL RESIDUAL ABSCESS CAVITY.  FISTULOUS COMMUNICATION WITH THE 

DISTAL DESCENDING COLON WITH COLONIC FINDINGS AS DESCRIBED ABOVE.


 








Fluoroscopy  02/08/19 00:00


IMPRESSION:  SMALL RESIDUAL ABSCESS CAVITY.  FISTULOUS COMMUNICATION WITH THE 

DISTAL DESCENDING COLON WITH COLONIC FINDINGS AS DESCRIBED ABOVE.


 














Assessment & Plan





- Diagnosis


(1) Diverticulitis large intestine


Is this a current diagnosis for this admission?: Yes   





(2) Abdominal abscess


Is this a current diagnosis for this admission?: Yes   





(3) Colonic fistula


Is this a current diagnosis for this admission?: Yes   





- Time


Time Spent with patient: 15-24 minutes





- Inpatient Certification


Medical Necessity: Need For IV Fluids, Need for IV Antibiotics, Need for Surgery





- Plan Summary


Plan Summary: 





Fistulogram is positive for colonic fistula. Drain in good position 


Will need a subtotal colectomy


Will D/W patient

## 2019-02-09 RX ADMIN — DEXTROSE AND SODIUM CHLORIDE PRN MLS/HR: 5; .45 INJECTION, SOLUTION INTRAVENOUS at 18:17

## 2019-02-09 RX ADMIN — PIPERACILLIN AND TAZOBACTAM SCH MLS/HR: 3; .375 INJECTION, POWDER, LYOPHILIZED, FOR SOLUTION INTRAVENOUS; PARENTERAL at 00:59

## 2019-02-09 RX ADMIN — ENOXAPARIN SODIUM SCH MG: 40 INJECTION SUBCUTANEOUS at 10:07

## 2019-02-09 RX ADMIN — PIPERACILLIN AND TAZOBACTAM SCH MLS/HR: 3; .375 INJECTION, POWDER, LYOPHILIZED, FOR SOLUTION INTRAVENOUS; PARENTERAL at 23:25

## 2019-02-09 RX ADMIN — PIPERACILLIN AND TAZOBACTAM SCH MLS/HR: 3; .375 INJECTION, POWDER, LYOPHILIZED, FOR SOLUTION INTRAVENOUS; PARENTERAL at 05:52

## 2019-02-09 RX ADMIN — Medication SCH ML: at 21:18

## 2019-02-09 RX ADMIN — GABAPENTIN SCH MG: 300 CAPSULE ORAL at 21:18

## 2019-02-09 RX ADMIN — PIPERACILLIN AND TAZOBACTAM SCH MLS/HR: 3; .375 INJECTION, POWDER, LYOPHILIZED, FOR SOLUTION INTRAVENOUS; PARENTERAL at 12:24

## 2019-02-09 RX ADMIN — Medication SCH: at 13:38

## 2019-02-09 RX ADMIN — DIPHENHYDRAMINE HYDROCHLORIDE SCH MG: 25 CAPSULE ORAL at 21:18

## 2019-02-09 RX ADMIN — PIPERACILLIN AND TAZOBACTAM SCH MLS/HR: 3; .375 INJECTION, POWDER, LYOPHILIZED, FOR SOLUTION INTRAVENOUS; PARENTERAL at 18:15

## 2019-02-09 RX ADMIN — Medication SCH: at 05:52

## 2019-02-09 NOTE — PDOC PROGRESS REPORT
Subjective


Subjective:: 





pains suprapubic area 


No N/V


Reason For Visit: 


DIVERTICULITIS,SIGMOID AND DESCENDING COLON,INTRA-








Physical Exam


Vital Signs: 


                                        











Temp Pulse Resp BP Pulse Ox


 


 97.9 F   96   18   140/68 H  94 


 


 02/09/19 07:53  02/09/19 07:53  02/09/19 04:49  02/09/19 07:53  02/09/19 07:53








                                 Intake & Output











 02/08/19 02/09/19 02/10/19





 06:59 06:59 06:59


 


Intake Total 500 400 100


 


Output Total 90 10 


 


Balance 410 390 100


 


Weight 74.4 kg 72.6 kg 











Exam: 





abdomen is soft with mild tenderness around drainage catheter


Drainage small amount


Has fistula to large bowel with irregularity of descending colon likely due to 

ulcerative colitis





Results


Laboratory Results: 


                                        





                                 02/07/19 05:20 





                                 02/07/19 05:20 





                                        





02/07/19 01:40   Abdomen - Abscess   Gram Stain - Final


02/07/19 01:40   Abdomen - Abscess   Wound Culture - Final


                            Escherichia Coli


                            Enterococcus Faecalis(Group D)


                            Prevotella Species


                            Skin Monik








Impressions: 


                                        





Abdomen/Pelvis CT  02/06/19 00:00


IMPRESSION:  Diverticulitis of the descending colon and sigmoid colon.  No 

definitive abscess or perforation.


Pigtail catheter in the lower abdomen.


 








Chest X-Ray  02/06/19 00:00


IMPRESSION:


 


Subsegmental atelectasis left lung base. Mild elevation left


hemidiaphragm


 


 


copyright 2011 [employer]- All Rights Reserved


 








Abdomen Ultrasound  02/07/19 00:00


IMPRESSION:


 


No acute sonographic abnormality.


 








Catheter Patency X-Ray  02/08/19 00:00


IMPRESSION:  SMALL RESIDUAL ABSCESS CAVITY.  FISTULOUS COMMUNICATION WITH THE 

DISTAL DESCENDING COLON WITH COLONIC FINDINGS AS DESCRIBED ABOVE.


 








Fluoroscopy  02/08/19 00:00


IMPRESSION:  SMALL RESIDUAL ABSCESS CAVITY.  FISTULOUS COMMUNICATION WITH THE 

DISTAL DESCENDING COLON WITH COLONIC FINDINGS AS DESCRIBED ABOVE.


 














Assessment & Plan





- Diagnosis


(1) Diverticulitis large intestine


Is this a current diagnosis for this admission?: Yes   





(2) Abdominal abscess


Is this a current diagnosis for this admission?: Yes   





(3) Colonic fistula


Is this a current diagnosis for this admission?: Yes   





- Time


Time Spent with patient: 15-24 minutes





- Inpatient Certification


Medical Necessity: Need For IV Fluids, Need for IV Antibiotics, Need for 

Surgery, Risk of Complication if Not Cared For in Hospital





- Plan Summary


Plan Summary: 





Will need a subtotal colectomy. Have explained to the patient the need for 

surgery. She understands and will discuss with her family


Continue IV antibiotics


Continue clears


Possible OR Monday

## 2019-02-10 LAB
ADD MANUAL DIFF: NO
ALBUMIN SERPL-MCNC: 2 G/DL (ref 3.5–5)
ALP SERPL-CCNC: 512 U/L (ref 38–126)
ALT SERPL-CCNC: 115 U/L (ref 9–52)
ANION GAP SERPL CALC-SCNC: 5 MMOL/L (ref 5–19)
ANION GAP SERPL CALC-SCNC: 7 MMOL/L (ref 5–19)
AST SERPL-CCNC: 44 U/L (ref 14–36)
BASOPHILS # BLD AUTO: 0 10^3/UL (ref 0–0.2)
BASOPHILS NFR BLD AUTO: 0.1 % (ref 0–2)
BILIRUB DIRECT SERPL-MCNC: 0.4 MG/DL (ref 0–0.4)
BILIRUB SERPL-MCNC: 0.9 MG/DL (ref 0.2–1.3)
BUN SERPL-MCNC: 5 MG/DL (ref 7–20)
BUN SERPL-MCNC: 8 MG/DL (ref 7–20)
CALCIUM: 6.9 MG/DL (ref 8.4–10.2)
CALCIUM: 7.1 MG/DL (ref 8.4–10.2)
CHLORIDE SERPL-SCNC: 101 MMOL/L (ref 98–107)
CHLORIDE SERPL-SCNC: 96 MMOL/L (ref 98–107)
CO2 SERPL-SCNC: 31 MMOL/L (ref 22–30)
CO2 SERPL-SCNC: 35 MMOL/L (ref 22–30)
EOSINOPHIL # BLD AUTO: 0 10^3/UL (ref 0–0.6)
EOSINOPHIL NFR BLD AUTO: 0 % (ref 0–6)
ERYTHROCYTE [DISTWIDTH] IN BLOOD BY AUTOMATED COUNT: 18.8 % (ref 11.5–14)
GLUCOSE SERPL-MCNC: 110 MG/DL (ref 75–110)
GLUCOSE SERPL-MCNC: 83 MG/DL (ref 75–110)
HCT VFR BLD CALC: 27 % (ref 36–47)
HGB BLD-MCNC: 9.4 G/DL (ref 12–15.5)
LYMPHOCYTES # BLD AUTO: 1 10^3/UL (ref 0.5–4.7)
LYMPHOCYTES NFR BLD AUTO: 10.3 % (ref 13–45)
MCH RBC QN AUTO: 32.4 PG (ref 27–33.4)
MCHC RBC AUTO-ENTMCNC: 34.9 G/DL (ref 32–36)
MCV RBC AUTO: 93 FL (ref 80–97)
MONOCYTES # BLD AUTO: 0.3 10^3/UL (ref 0.1–1.4)
MONOCYTES NFR BLD AUTO: 3.2 % (ref 3–13)
NEUTROPHILS # BLD AUTO: 8 10^3/UL (ref 1.7–8.2)
NEUTS SEG NFR BLD AUTO: 86.4 % (ref 42–78)
PLATELET # BLD: 318 10^3/UL (ref 150–450)
POTASSIUM SERPL-SCNC: 2.2 MMOL/L (ref 3.6–5)
POTASSIUM SERPL-SCNC: 3 MMOL/L (ref 3.6–5)
PROT SERPL-MCNC: 3.9 G/DL (ref 6.3–8.2)
RBC # BLD AUTO: 2.91 10^6/UL (ref 3.72–5.28)
SODIUM SERPL-SCNC: 137.3 MMOL/L (ref 137–145)
SODIUM SERPL-SCNC: 137.6 MMOL/L (ref 137–145)
TOTAL CELLS COUNTED % (AUTO): 100 %
WBC # BLD AUTO: 9.3 10^3/UL (ref 4–10.5)

## 2019-02-10 RX ADMIN — PIPERACILLIN AND TAZOBACTAM SCH MLS/HR: 3; .375 INJECTION, POWDER, LYOPHILIZED, FOR SOLUTION INTRAVENOUS; PARENTERAL at 23:55

## 2019-02-10 RX ADMIN — GABAPENTIN SCH MG: 300 CAPSULE ORAL at 10:18

## 2019-02-10 RX ADMIN — GABAPENTIN SCH MG: 300 CAPSULE ORAL at 21:42

## 2019-02-10 RX ADMIN — PIPERACILLIN AND TAZOBACTAM SCH MLS/HR: 3; .375 INJECTION, POWDER, LYOPHILIZED, FOR SOLUTION INTRAVENOUS; PARENTERAL at 17:35

## 2019-02-10 RX ADMIN — Medication SCH: at 13:39

## 2019-02-10 RX ADMIN — PIPERACILLIN AND TAZOBACTAM SCH MLS/HR: 3; .375 INJECTION, POWDER, LYOPHILIZED, FOR SOLUTION INTRAVENOUS; PARENTERAL at 12:55

## 2019-02-10 RX ADMIN — FLUOXETINE SCH MG: 20 CAPSULE ORAL at 10:19

## 2019-02-10 RX ADMIN — Medication SCH ML: at 21:35

## 2019-02-10 RX ADMIN — DIPHENHYDRAMINE HYDROCHLORIDE SCH MG: 25 CAPSULE ORAL at 21:42

## 2019-02-10 RX ADMIN — DEXTROSE AND SODIUM CHLORIDE PRN MLS/HR: 5; .45 INJECTION, SOLUTION INTRAVENOUS at 23:56

## 2019-02-10 RX ADMIN — POTASSIUM CHLORIDE SCH MLS/HR: 29.8 INJECTION, SOLUTION INTRAVENOUS at 05:58

## 2019-02-10 RX ADMIN — Medication SCH ML: at 05:17

## 2019-02-10 RX ADMIN — PIPERACILLIN AND TAZOBACTAM SCH MLS/HR: 3; .375 INJECTION, POWDER, LYOPHILIZED, FOR SOLUTION INTRAVENOUS; PARENTERAL at 05:17

## 2019-02-10 RX ADMIN — ENOXAPARIN SODIUM SCH MG: 40 INJECTION SUBCUTANEOUS at 09:59

## 2019-02-10 RX ADMIN — POTASSIUM CHLORIDE SCH MLS/HR: 29.8 INJECTION, SOLUTION INTRAVENOUS at 07:30

## 2019-02-10 NOTE — PDOC PROGRESS REPORT
Subjective


Progress Note for:: 02/10/19


Subjective:: 





mild pains around drainage catheter site


Reason For Visit: 


DIVERTICULITIS,SIGMOID AND DESCENDING COLON,INTRA-








Physical Exam


Vital Signs: 


                                        











Temp Pulse Resp BP Pulse Ox


 


 98.2 F   102 H  16   121/72   100 


 


 02/10/19 08:38  02/10/19 08:38  02/10/19 08:38  02/10/19 08:38  02/10/19 08:38








                                 Intake & Output











 02/09/19 02/10/19 02/11/19





 06:59 06:59 06:59


 


Intake Total 400 640 38


 


Output Total 10  


 


Balance 390 640 38


 


Weight 72.6 kg 72.5 kg 











Exam: 





abdomen is soft with mild tenderness around catheter site.


Drainage about 15 ccs








Results


Laboratory Results: 


                                        





                                 02/10/19 04:15 





                                 02/10/19 04:15 





                                        











  02/10/19 02/10/19





  04:15 04:15


 


WBC  9.3 


 


RBC  2.91 L 


 


Hgb  9.4 L 


 


Hct  27.0 L 


 


MCV  93 


 


MCH  32.4 


 


MCHC  34.9 


 


RDW  18.8 H 


 


Plt Count  318 


 


Seg Neutrophils %  86.4 H 


 


Lymphocytes %  10.3 L 


 


Monocytes %  3.2 


 


Eosinophils %  0.0 


 


Basophils %  0.1 


 


Absolute Neutrophils  8.0 


 


Absolute Lymphocytes  1.0 


 


Absolute Monocytes  0.3 


 


Absolute Eosinophils  0.0 


 


Absolute Basophils  0.0 


 


Sodium   137.6


 


Potassium   2.2 L*


 


Chloride   96 L


 


Carbon Dioxide   35 H


 


Anion Gap   7


 


BUN   5 L


 


Creatinine   0.25 L


 


Est GFR ( Amer)   > 60


 


Est GFR (Non-Af Amer)   > 60


 


Glucose   83


 


Calcium   7.1 L


 


Total Bilirubin   0.9


 


AST   44 H


 


ALT   115 H


 


Alkaline Phosphatase   512 H


 


Total Protein   3.9 L


 


Albumin   2.0 L








                                        





02/06/19 16:00   Blood   Blood Culture - Final


                            Clostridium Sp.not Perfringens


02/07/19 01:40   Abdomen - Abscess   Gram Stain - Final


02/07/19 01:40   Abdomen - Abscess   Wound Culture - Final


                            Escherichia Coli


                            Enterococcus Faecalis(Group D)


                            Prevotella Species


                            Skin Monik








Impressions: 


                                        





Abdomen/Pelvis CT  02/06/19 00:00


IMPRESSION:  Diverticulitis of the descending colon and sigmoid colon.  No 

definitive abscess or perforation.


Pigtail catheter in the lower abdomen.


 








Chest X-Ray  02/06/19 00:00


IMPRESSION:


 


Subsegmental atelectasis left lung base. Mild elevation left


hemidiaphragm


 


 


copyright 2011 [employer]- All Rights Reserved


 








Abdomen Ultrasound  02/07/19 00:00


IMPRESSION:


 


No acute sonographic abnormality.


 








Catheter Patency X-Ray  02/08/19 00:00


IMPRESSION:  SMALL RESIDUAL ABSCESS CAVITY.  FISTULOUS COMMUNICATION WITH THE 

DISTAL DESCENDING COLON WITH COLONIC FINDINGS AS DESCRIBED ABOVE.


 








Fluoroscopy  02/08/19 00:00


IMPRESSION:  SMALL RESIDUAL ABSCESS CAVITY.  FISTULOUS COMMUNICATION WITH THE 

DISTAL DESCENDING COLON WITH COLONIC FINDINGS AS DESCRIBED ABOVE.


 














Assessment & Plan





- Diagnosis


(1) Diverticulitis large intestine


Is this a current diagnosis for this admission?: Yes   





(2) Abdominal abscess


Is this a current diagnosis for this admission?: Yes   





(3) Colonic fistula


Is this a current diagnosis for this admission?: Yes   





- Time


Time Spent with patient: 15-24 minutes





- Plan Summary


Plan Summary: 





For possible subtotal colectomy tomorrow.


Npo after midnite


Correct K+


Procedure for colectomy explained to patient and risks mentioned


She understands and agrees with procedure

## 2019-02-11 LAB
ABSOLUTE LYMPHOCYTES# (MANUAL): 1 10^3/UL (ref 0.5–4.7)
ABSOLUTE LYMPHOCYTES# (MANUAL): 2.9 10^3/UL (ref 0.5–4.7)
ABSOLUTE MONOCYTES # (MANUAL): 0 10^3/UL (ref 0.1–1.4)
ABSOLUTE MONOCYTES # (MANUAL): 0 10^3/UL (ref 0.1–1.4)
ABSOLUTE NEUTROPHILS# (MANUAL): 19 10^3/UL (ref 1.7–8.2)
ABSOLUTE NEUTROPHILS# (MANUAL): 29.2 10^3/UL (ref 1.7–8.2)
ADD MANUAL DIFF: NO
ADD MANUAL DIFF: YES
ADD MANUAL DIFF: YES
ALBUMIN SERPL-MCNC: 1.9 G/DL (ref 3.5–5)
ALBUMIN SERPL-MCNC: < 1 G/DL (ref 3.5–5)
ALP SERPL-CCNC: 186 U/L (ref 38–126)
ALP SERPL-CCNC: 252 U/L (ref 38–126)
ALT SERPL-CCNC: 2404 U/L (ref 9–52)
ALT SERPL-CCNC: 60 U/L (ref 9–52)
ANION GAP SERPL CALC-SCNC: 10 MMOL/L (ref 5–19)
ANION GAP SERPL CALC-SCNC: 17 MMOL/L (ref 5–19)
ANION GAP SERPL CALC-SCNC: 5 MMOL/L (ref 5–19)
ANISOCYTOSIS BLD QL SMEAR: (no result)
ANISOCYTOSIS BLD QL SMEAR: (no result)
ARTERIAL BLOOD FIO2: (no result)
ARTERIAL BLOOD FIO2: (no result)
ARTERIAL BLOOD H2CO3: 0.99 MMOL/L (ref 1.05–1.35)
ARTERIAL BLOOD H2CO3: 1.45 MMOL/L (ref 1.05–1.35)
ARTERIAL BLOOD HCO3: 10.1 MMOL/L (ref 20–24)
ARTERIAL BLOOD HCO3: 17.3 MMOL/L (ref 20–24)
ARTERIAL BLOOD PCO2: 33 MMHG (ref 35–45)
ARTERIAL BLOOD PCO2: 48.2 MMHG (ref 35–45)
ARTERIAL BLOOD PH: 7.1 (ref 7.35–7.45)
ARTERIAL BLOOD PH: 7.17 (ref 7.35–7.45)
ARTERIAL BLOOD PO2: 150.8 MMHG (ref 80–100)
ARTERIAL BLOOD PO2: 481.2 MMHG (ref 80–100)
ARTERIAL BLOOD TOTAL CO2: 11.1 MMOL/L (ref 21–25)
ARTERIAL BLOOD TOTAL CO2: 18.8 MMOL/L (ref 21–25)
AST SERPL-CCNC: 25 U/L (ref 14–36)
AST SERPL-CCNC: 3000 U/L (ref 14–36)
BASE EXCESS BLDA CALC-SCNC: -10.8 MMOL/L
BASE EXCESS BLDA CALC-SCNC: -18.3 MMOL/L
BASOPHILS # BLD AUTO: 0 10^3/UL (ref 0–0.2)
BASOPHILS NFR BLD AUTO: 0.1 % (ref 0–2)
BASOPHILS NFR BLD MANUAL: 0 % (ref 0–2)
BASOPHILS NFR BLD MANUAL: 1 % (ref 0–2)
BILIRUB DIRECT SERPL-MCNC: 0.2 MG/DL (ref 0–0.4)
BILIRUB DIRECT SERPL-MCNC: 1.2 MG/DL (ref 0–0.4)
BILIRUB SERPL-MCNC: 0.5 MG/DL (ref 0.2–1.3)
BILIRUB SERPL-MCNC: 1.9 MG/DL (ref 0.2–1.3)
BUN SERPL-MCNC: 10 MG/DL (ref 7–20)
BUN SERPL-MCNC: 9 MG/DL (ref 7–20)
BUN SERPL-MCNC: 9 MG/DL (ref 7–20)
BURR CELLS BLD QL SMEAR: (no result)
CALCIUM: 6.2 MG/DL (ref 8.4–10.2)
CALCIUM: 6.5 MG/DL (ref 8.4–10.2)
CALCIUM: 7.2 MG/DL (ref 8.4–10.2)
CHLORIDE SERPL-SCNC: 103 MMOL/L (ref 98–107)
CHLORIDE SERPL-SCNC: 104 MMOL/L (ref 98–107)
CHLORIDE SERPL-SCNC: 108 MMOL/L (ref 98–107)
CO2 SERPL-SCNC: 14 MMOL/L (ref 22–30)
CO2 SERPL-SCNC: 20 MMOL/L (ref 22–30)
CO2 SERPL-SCNC: 30 MMOL/L (ref 22–30)
EOSINOPHIL # BLD AUTO: 0 10^3/UL (ref 0–0.6)
EOSINOPHIL NFR BLD AUTO: 0 % (ref 0–6)
EOSINOPHIL NFR BLD MANUAL: 0 % (ref 0–6)
EOSINOPHIL NFR BLD MANUAL: 0 % (ref 0–6)
ERYTHROCYTE [DISTWIDTH] IN BLOOD BY AUTOMATED COUNT: 17.2 % (ref 11.5–14)
ERYTHROCYTE [DISTWIDTH] IN BLOOD BY AUTOMATED COUNT: 17.4 % (ref 11.5–14)
ERYTHROCYTE [DISTWIDTH] IN BLOOD BY AUTOMATED COUNT: 19.3 % (ref 11.5–14)
GLUCOSE SERPL-MCNC: 116 MG/DL (ref 75–110)
GLUCOSE SERPL-MCNC: 256 MG/DL (ref 75–110)
GLUCOSE SERPL-MCNC: 266 MG/DL (ref 75–110)
HCT VFR BLD CALC: 26.6 % (ref 36–47)
HCT VFR BLD CALC: 28.1 % (ref 36–47)
HCT VFR BLD CALC: 30.5 % (ref 36–47)
HGB BLD-MCNC: 10.1 G/DL (ref 12–15.5)
HGB BLD-MCNC: 9.1 G/DL (ref 12–15.5)
HGB BLD-MCNC: 9.2 G/DL (ref 12–15.5)
LYMPHOCYTES # BLD AUTO: 0.9 10^3/UL (ref 0.5–4.7)
LYMPHOCYTES NFR BLD AUTO: 7.3 % (ref 13–45)
MCH RBC QN AUTO: 31 PG (ref 27–33.4)
MCH RBC QN AUTO: 31.5 PG (ref 27–33.4)
MCH RBC QN AUTO: 32.3 PG (ref 27–33.4)
MCHC RBC AUTO-ENTMCNC: 32.7 G/DL (ref 32–36)
MCHC RBC AUTO-ENTMCNC: 33 G/DL (ref 32–36)
MCHC RBC AUTO-ENTMCNC: 34.2 G/DL (ref 32–36)
MCV RBC AUTO: 94 FL (ref 80–97)
MCV RBC AUTO: 94 FL (ref 80–97)
MCV RBC AUTO: 96 FL (ref 80–97)
MONOCYTES # BLD AUTO: 0.4 10^3/UL (ref 0.1–1.4)
MONOCYTES % (MANUAL): 0 % (ref 3–13)
MONOCYTES % (MANUAL): 0 % (ref 3–13)
MONOCYTES NFR BLD AUTO: 2.9 % (ref 3–13)
NEUTROPHILS # BLD AUTO: 10.9 10^3/UL (ref 1.7–8.2)
NEUTS BAND NFR BLD MANUAL: 5 % (ref 3–5)
NEUTS BAND NFR BLD MANUAL: 6 % (ref 3–5)
NEUTS SEG NFR BLD AUTO: 89.7 % (ref 42–78)
PLATELET # BLD: 279 10^3/UL (ref 150–450)
PLATELET # BLD: 344 10^3/UL (ref 150–450)
PLATELET # BLD: 366 10^3/UL (ref 150–450)
PLATELET CLUMP BLD QL SMEAR: PRESENT
PLATELET CLUMP BLD QL SMEAR: PRESENT
PLATELET COMMENT: ADEQUATE
PLATELET COMMENT: ADEQUATE
POIKILOCYTOSIS BLD QL SMEAR: (no result)
POLYCHROMASIA BLD QL SMEAR: SLIGHT
POLYCHROMASIA BLD QL SMEAR: SLIGHT
POTASSIUM SERPL-SCNC: 4.4 MMOL/L (ref 3.6–5)
POTASSIUM SERPL-SCNC: 4.6 MMOL/L (ref 3.6–5)
POTASSIUM SERPL-SCNC: 4.7 MMOL/L (ref 3.6–5)
PROT SERPL-MCNC: 2.2 G/DL (ref 6.3–8.2)
PROT SERPL-MCNC: 3.3 G/DL (ref 6.3–8.2)
RBC # BLD AUTO: 2.82 10^6/UL (ref 3.72–5.28)
RBC # BLD AUTO: 2.91 10^6/UL (ref 3.72–5.28)
RBC # BLD AUTO: 3.25 10^6/UL (ref 3.72–5.28)
SAO2 % BLDA: 98.4 % (ref 94–98)
SAO2 % BLDA: 99.8 % (ref 94–98)
SEGMENTED NEUTROPHILS % (MAN): 85 % (ref 42–78)
SEGMENTED NEUTROPHILS % (MAN): 89 % (ref 42–78)
SODIUM SERPL-SCNC: 134.2 MMOL/L (ref 137–145)
SODIUM SERPL-SCNC: 137.6 MMOL/L (ref 137–145)
SODIUM SERPL-SCNC: 138.9 MMOL/L (ref 137–145)
TOTAL CELLS COUNTED % (AUTO): 100 %
TOTAL CELLS COUNTED BLD: 100
TOTAL CELLS COUNTED BLD: 100
TOXIC GRANULES BLD QL SMEAR: SLIGHT
TOXIC GRANULES BLD QL SMEAR: SLIGHT
VARIANT LYMPHS NFR BLD MANUAL: 5 % (ref 13–45)
VARIANT LYMPHS NFR BLD MANUAL: 9 % (ref 13–45)
WBC # BLD AUTO: 12.1 10^3/UL (ref 4–10.5)
WBC # BLD AUTO: 20 10^3/UL (ref 4–10.5)
WBC # BLD AUTO: 32.4 10^3/UL (ref 4–10.5)
WBC TOXIC VACUOLES BLD QL SMEAR: PRESENT

## 2019-02-11 PROCEDURE — 0DBB0ZZ EXCISION OF ILEUM, OPEN APPROACH: ICD-10-PCS | Performed by: SURGERY

## 2019-02-11 PROCEDURE — 0J9C00Z DRAINAGE OF PELVIC REGION SUBCUTANEOUS TISSUE AND FASCIA WITH DRAINAGE DEVICE, OPEN APPROACH: ICD-10-PCS | Performed by: SURGERY

## 2019-02-11 PROCEDURE — 5A1955Z RESPIRATORY VENTILATION, GREATER THAN 96 CONSECUTIVE HOURS: ICD-10-PCS | Performed by: SURGERY

## 2019-02-11 PROCEDURE — 0DTF0ZZ RESECTION OF RIGHT LARGE INTESTINE, OPEN APPROACH: ICD-10-PCS | Performed by: SURGERY

## 2019-02-11 PROCEDURE — 0D1B0Z4 BYPASS ILEUM TO CUTANEOUS, OPEN APPROACH: ICD-10-PCS | Performed by: SURGERY

## 2019-02-11 PROCEDURE — 0DN80ZZ RELEASE SMALL INTESTINE, OPEN APPROACH: ICD-10-PCS | Performed by: SURGERY

## 2019-02-11 PROCEDURE — 0BH17EZ INSERTION OF ENDOTRACHEAL AIRWAY INTO TRACHEA, VIA NATURAL OR ARTIFICIAL OPENING: ICD-10-PCS | Performed by: SURGERY

## 2019-02-11 PROCEDURE — 0DQ80ZZ REPAIR SMALL INTESTINE, OPEN APPROACH: ICD-10-PCS | Performed by: SURGERY

## 2019-02-11 RX ADMIN — PHENYLEPHRINE HYDROCHLORIDE PRN MLS/HR: 10 INJECTION INTRAVENOUS at 16:26

## 2019-02-11 RX ADMIN — FLUOXETINE SCH: 20 CAPSULE ORAL at 14:58

## 2019-02-11 RX ADMIN — PHENYLEPHRINE HYDROCHLORIDE PRN MLS/HR: 10 INJECTION INTRAVENOUS at 20:22

## 2019-02-11 RX ADMIN — SODIUM CHLORIDE, SODIUM LACTATE, POTASSIUM CHLORIDE, AND CALCIUM CHLORIDE PRN MLS/HR: .6; .31; .03; .02 INJECTION, SOLUTION INTRAVENOUS at 20:00

## 2019-02-11 RX ADMIN — Medication SCH: at 21:13

## 2019-02-11 RX ADMIN — PIPERACILLIN AND TAZOBACTAM SCH MLS/HR: 3; .375 INJECTION, POWDER, LYOPHILIZED, FOR SOLUTION INTRAVENOUS; PARENTERAL at 06:58

## 2019-02-11 RX ADMIN — GABAPENTIN SCH: 300 CAPSULE ORAL at 14:58

## 2019-02-11 RX ADMIN — ALBUMIN (HUMAN) SCH: 12.5 SOLUTION INTRAVENOUS at 22:00

## 2019-02-11 RX ADMIN — Medication SCH: at 14:57

## 2019-02-11 RX ADMIN — POTASSIUM CHLORIDE SCH MLS/HR: 29.8 INJECTION, SOLUTION INTRAVENOUS at 01:48

## 2019-02-11 RX ADMIN — ALBUMIN (HUMAN) SCH: 12.5 SOLUTION INTRAVENOUS at 23:00

## 2019-02-11 RX ADMIN — Medication SCH: at 15:19

## 2019-02-11 RX ADMIN — NOREPINEPHRINE BITARTRATE PRN MLS/HR: 1 INJECTION, SOLUTION, CONCENTRATE INTRAVENOUS at 18:13

## 2019-02-11 RX ADMIN — DIPHENHYDRAMINE HYDROCHLORIDE SCH: 25 CAPSULE ORAL at 21:13

## 2019-02-11 RX ADMIN — GABAPENTIN SCH: 300 CAPSULE ORAL at 21:19

## 2019-02-11 RX ADMIN — POTASSIUM CHLORIDE SCH MLS/HR: 29.8 INJECTION, SOLUTION INTRAVENOUS at 04:01

## 2019-02-11 RX ADMIN — DEXTROSE PRN MLS/HR: 5 SOLUTION INTRAVENOUS at 13:06

## 2019-02-11 RX ADMIN — Medication SCH: at 14:58

## 2019-02-11 RX ADMIN — PHENYLEPHRINE HYDROCHLORIDE PRN MLS/HR: 10 INJECTION INTRAVENOUS at 12:35

## 2019-02-11 RX ADMIN — DOPAMINE HYDROCHLORIDE PRN MLS/HR: 320 INJECTION, SOLUTION INTRAVENOUS at 13:30

## 2019-02-11 RX ADMIN — NOREPINEPHRINE BITARTRATE PRN MLS/HR: 1 INJECTION, SOLUTION, CONCENTRATE INTRAVENOUS at 12:53

## 2019-02-11 RX ADMIN — AMPICILLIN SODIUM AND SULBACTAM SODIUM SCH MLS/HR: 2; 1 INJECTION, POWDER, FOR SOLUTION INTRAMUSCULAR; INTRAVENOUS at 16:27

## 2019-02-11 RX ADMIN — ALBUMIN (HUMAN) SCH MLS/HR: 12.5 SOLUTION INTRAVENOUS at 21:12

## 2019-02-11 RX ADMIN — ALBUMIN (HUMAN) SCH MLS/HR: 12.5 SOLUTION INTRAVENOUS at 18:54

## 2019-02-11 RX ADMIN — METRONIDAZOLE SCH MLS/HR: 500 INJECTION, SOLUTION INTRAVENOUS at 18:54

## 2019-02-11 RX ADMIN — ENOXAPARIN SODIUM SCH: 40 INJECTION SUBCUTANEOUS at 14:58

## 2019-02-11 RX ADMIN — ALBUMIN (HUMAN) SCH MLS/HR: 12.5 SOLUTION INTRAVENOUS at 20:33

## 2019-02-11 RX ADMIN — AMPICILLIN SODIUM AND SULBACTAM SODIUM SCH MLS/HR: 2; 1 INJECTION, POWDER, FOR SOLUTION INTRAMUSCULAR; INTRAVENOUS at 21:27

## 2019-02-11 NOTE — EKG REPORT
SEVERITY:- ABNORMAL ECG -

SINUS RHYTHM

BORDERLINE LEFT AXIS DEVIATION

BORDERLINE T WAVE ABNORMALITIES

PROLONGED QT INTERVAL

:

Confirmed by: Kae Tao 11-Feb-2019 00:08:08

## 2019-02-11 NOTE — PDOC PROGRESS REPORT
Subjective


Progress Note for:: 02/11/19


Reason For Visit: 


DIVERTICULITIS,SIGMOID AND DESCENDING COLON,INTRA-


sepsis


need for arterial line placement





Physical Exam


Vital Signs: 


                                        











Temp Pulse Resp BP Pulse Ox


 


 97.7 F   143 H  18   66/45 L  100 


 


 02/11/19 14:00  02/11/19 14:00  02/11/19 14:00  02/11/19 14:00  02/11/19 14:00








                                 Intake & Output











 02/10/19 02/11/19 02/12/19





 06:59 06:59 06:59


 


Intake Total 640 1488 43075


 


Output Total  60 8650


 


Balance 640 1428 5490


 


Weight 72.5 kg  











General appearance: PRESENT: obese, other - septic, non responsive, hypotensive


Cardiovascular exam: PRESENT: tachycardia


Pulses: PRESENT: other - pulses in upper ext non palpable femoral pulse barely 

palp





Results


Laboratory Results: 


                                        





                                 02/11/19 13:32 





                                 02/11/19 13:32 





                                        











  02/10/19 02/10/19 02/11/19





  15:10 22:45 07:00


 


WBC    12.1 H


 


RBC    2.82 L


 


Hgb    9.1 L


 


Hct    26.6 L


 


MCV    94


 


MCH    32.3


 


MCHC    34.2


 


RDW    19.3 H


 


Plt Count    366


 


Seg Neutrophils %    89.7 H


 


Lymphocytes %    7.3 L


 


Monocytes %    2.9 L


 


Eosinophils %    0.0


 


Basophils %    0.1


 


Absolute Neutrophils    10.9 H


 


Absolute Lymphocytes    0.9


 


Absolute Monocytes    0.4


 


Absolute Eosinophils    0.0


 


Absolute Basophils    0.0


 


Carbonic Acid   


 


HCO3/H2CO3 Ratio   


 


ABG pH   


 


ABG pCO2   


 


ABG pO2   


 


ABG HCO3   


 


ABG O2 Saturation   


 


ABG Base Excess   


 


FiO2   


 


Sodium   137.3 


 


Potassium   3.0 L* 


 


Chloride   101 


 


Carbon Dioxide   31 H 


 


Anion Gap   5 


 


BUN   8 


 


Creatinine   0.23 L 


 


Est GFR ( Amer)   > 60 


 


Est GFR (Non-Af Amer)   > 60 


 


Glucose   110 


 


Calcium   6.9 L* 


 


Magnesium   


 


Total Bilirubin   


 


AST   


 


ALT   


 


Alkaline Phosphatase   


 


Total Protein   


 


Albumin   


 


Blood Type  O POSITIVE  


 


Antibody Screen  NEGATIVE  














  02/11/19 02/11/19 02/11/19





  07:00 13:10 13:32


 


WBC    20.0 H


 


RBC    3.25 L


 


Hgb    10.1 L


 


Hct    30.5 L


 


MCV    94


 


MCH    31.0


 


MCHC    33.0


 


RDW    17.2 H


 


Plt Count    344


 


Seg Neutrophils %    Not Reportable


 


Lymphocytes %    Not Reportable


 


Monocytes %    Not Reportable


 


Eosinophils %    Not Reportable


 


Basophils %    Not Reportable


 


Absolute Neutrophils    Not Reportable


 


Absolute Lymphocytes    Not Reportable


 


Absolute Monocytes    Not Reportable


 


Absolute Eosinophils    Not Reportable


 


Absolute Basophils    Not Reportable


 


Carbonic Acid   1.45 H 


 


HCO3/H2CO3 Ratio   11:1 


 


ABG pH   7.17 L* 


 


ABG pCO2   48.2 H 


 


ABG pO2   150.8 H 


 


ABG HCO3   17.3 L 


 


ABG O2 Saturation   98.4 H 


 


ABG Base Excess   -10.8 


 


FiO2   40% 


 


Sodium  138.9  


 


Potassium  4.4  D  


 


Chloride  104  


 


Carbon Dioxide  30  


 


Anion Gap  5  


 


BUN  10  


 


Creatinine  0.19 L  


 


Est GFR ( Amer)  > 60  


 


Est GFR (Non-Af Amer)  > 60  


 


Glucose  116 H  


 


Calcium  7.2 L  


 


Magnesium   


 


Total Bilirubin   


 


AST   


 


ALT   


 


Alkaline Phosphatase   


 


Total Protein   


 


Albumin   


 


Blood Type   


 


Antibody Screen   














  02/11/19





  13:32


 


WBC 


 


RBC 


 


Hgb 


 


Hct 


 


MCV 


 


MCH 


 


MCHC 


 


RDW 


 


Plt Count 


 


Seg Neutrophils % 


 


Lymphocytes % 


 


Monocytes % 


 


Eosinophils % 


 


Basophils % 


 


Absolute Neutrophils 


 


Absolute Lymphocytes 


 


Absolute Monocytes 


 


Absolute Eosinophils 


 


Absolute Basophils 


 


Carbonic Acid 


 


HCO3/H2CO3 Ratio 


 


ABG pH 


 


ABG pCO2 


 


ABG pO2 


 


ABG HCO3 


 


ABG O2 Saturation 


 


ABG Base Excess 


 


FiO2 


 


Sodium  137.6


 


Potassium  4.6


 


Chloride  108 H


 


Carbon Dioxide  20 L D


 


Anion Gap  10


 


BUN  9


 


Creatinine  0.29 L


 


Est GFR ( Amer)  > 60


 


Est GFR (Non-Af Amer)  > 60


 


Glucose  256 H


 


Calcium  6.2 L*


 


Magnesium  1.3 L


 


Total Bilirubin  0.5


 


AST  25


 


ALT  60 H


 


Alkaline Phosphatase  252 H


 


Total Protein  2.2 L


 


Albumin  < 1.0 L


 


Blood Type 


 


Antibody Screen 











Impressions: 


                                        





Abdomen/Pelvis CT  02/06/19 00:00


IMPRESSION:  Diverticulitis of the descending colon and sigmoid colon.  No 

definitive abscess or perforation.


Pigtail catheter in the lower abdomen.


 








Abdomen Ultrasound  02/07/19 00:00


IMPRESSION:


 


No acute sonographic abnormality.


 








Catheter Patency X-Ray  02/08/19 00:00


IMPRESSION:  SMALL RESIDUAL ABSCESS CAVITY.  FISTULOUS COMMUNICATION WITH THE 

DISTAL DESCENDING COLON WITH COLONIC FINDINGS AS DESCRIBED ABOVE.


 








Fluoroscopy  02/08/19 00:00


IMPRESSION:  SMALL RESIDUAL ABSCESS CAVITY.  FISTULOUS COMMUNICATION WITH THE 

DISTAL DESCENDING COLON WITH COLONIC FINDINGS AS DESCRIBED ABOVE.


 








Chest X-Ray  02/11/19 00:00


IMPRESSION:  1.  Interval placement of endotracheal and nasogastric tubes, since

the prior examination dated 2/6/2019.


2.  New finding of left base infiltrate/atelectasis.  Linear subsegmental 

atelectasis at the right lung base.


3.  Additional findings, see as above.


 














Assessment & Plan





- Plan Summary


Plan Summary: 





rt femoral central line placed via seldinger techneque


with 20g catheter


prepped rt groin with betadine


anesthesia 1% plain


3 attempts


line placed with good wave form


pt roberta well


sterile dressing place


line sutured in place.

## 2019-02-11 NOTE — RADIOLOGY REPORT (SQ)
EXAM DESCRIPTION:  CHEST SINGLE VIEW



COMPLETED DATE/TIME:  2/11/2019 2:02 pm



REASON FOR STUDY:  resp failure



COMPARISON:  2/6/2019



EXAM PARAMETERS:  NUMBER OF VIEWS: One view.

TECHNIQUE: Single frontal radiographic view of the chest acquired.

RADIATION DOSE: NA

LIMITATIONS: None.



FINDINGS:  LUNGS AND PLEURA:  New finding of left base airspace disease may represent infiltrate/atel
ectasis.  New subsegmental right base density may represent atelectasis.  No pneumothorax or pleural 
effusion.

MEDIASTINUM AND HILAR STRUCTURES: No masses.  Contour normal.

HEART AND VASCULAR STRUCTURES: Heart normal in size.  Normal vasculature.

BONES: No acute findings.

HARDWARE:  Interval placement of endotracheal tube, the tip lies approximately 4.6 cm superior to the
 virgilio.  Nasogastric tube has also been placed in the interval, with the tip in the region of the fu
ndus-proximal body of the stomach.  Stable right central venous line.

A coiled appearing catheter overlies the left upper quadrant of the abdomen.

OTHER:  A lucent appearance in the right upper quadrant of the abdomen new finding since the prior st
udy, maybe on the basis of colonic distention.  Correlation is suggested.



IMPRESSION:  1.  Interval placement of endotracheal and nasogastric tubes, since the prior examinatio
n dated 2/6/2019.

2.  New finding of left base infiltrate/atelectasis.  Linear subsegmental atelectasis at the right wojciech
ng base.

3.  Additional findings, see as above.



TECHNICAL DOCUMENTATION:  JOB ID:  [id number]

 2011 [employer]- All Rights Reserved



[place] location - IP/workstation name: [email or web address]

## 2019-02-11 NOTE — OPERATIVE REPORT
Operative Report


DATE OF SURGERY: 02/11/19


PREOPERATIVE DIAGNOSIS: 1.  History of ulcerative colitis.  2.  Acute sigmoid d

iverticulitis with perforation and pelvic abscess, status post percutaneous 

drainage of pelvic.  3.  Colocutaneous fistula.  4.  Immunosuppression


POSTOPERATIVE DIAGNOSIS: Same with.  1. complex pelvic abscess.  2.  Multiple 

intra-abdominal adhesions.  3.  Left upper quadrant abscess associated with 

splenic flexure of the colon


OPERATION: 1.  Exploratory laparotomy.  2.  Subtotal colectomy with permanent 

ileostomy and Laura's pouch.  3.  Intraoperative lysis of adhesions.  4.  

Small bowel resection.  5.  Small bowel enterotomy closures.  6.  Hepatic 

flexure takedown.  7.  Splenic flexure takedown with debridement of left upper 

quadrant abscess cavity.  8. intra-abdominal drain placement left upper 

quadrant, left lower quadrant, and pelvis.  9.  Extremely difficult modifier 

secondary to friable skin, prolonged operation, extensive adhesions, and 4-hour 

operative time


SURGEON: KRISTIN SCHUSTER


ANESTHESIA: GA


TISSUE REMOVED OR ALTERED: Fragments of pelvic abscess; multiple portions of 

colon submitted in sections; fragments of left upper quadrant abscess cavity; 

portions of omentum


COMPLICATIONS: 





None


ESTIMATED BLOOD LOSS: 700 cc


INTRAOPERATIVE FINDINGS: see Below


PROCEDURE: 





The patient was taken to the preop holding area the main operating room where 

general anesthesia was induced.  The right subclavian and central line site was 

resecured, new Biopatch and OpSite applied.





The patient was placed in the lithotomy position.  She was actively draining 

stool from the drainage catheter.  The drainage catheter placed percutaneously 

by radiology was removed.  The exit site was oversewed with 2-0 Prolene suture. 

The abdomen was prepped and draped in sterile fashion after Whiteside catheter was 

inserted which drained dark yellow urine





Surgical plan surgical timeout were conducted.





The abdomen was opened through a standard midline incision above and below the 

umbilicus.  I excised the acute colovesicular fistula tract down to the fascia, 

and this was disposed of.  There was a small infraumbilical hernia which was 

avoided.  The peritoneal cavity was sharply entered and there was no active 

stool or pus visible.  There were dense adhesions between multiple loops of 

small bowel, greater omentum, the pelvis.





We now spent approximately 25 minutes getting the small bowel out of the pelvis.

 This was due to the very thick semi-hemispheric rind of abscess cavity in the 

pelvis involving the upper part of the bladder, rectosigmoid colon, cecum, and 

multiple loops of small bowel.  Bookwalter retractor was established.  

Dissection was very tedious.  This was conducted primarily using sharp scissor 

dissection.  Eventually we had the small bowel elevated off of the inflammatory 

rim of the abscess cavity.  Multiple small bowel enterotomies were made to, and 

these were closed with a 2-0 and 3-0 Vicryl suture.  A portion of the matted up 

small bowel was actually resected, approximately 12 cm segment.  This was 

disposed of.  It was removed secondary to the dense adhesive bands.





Now that we have the small bowel out of the way, we were able to begin debriding

portions of the posterior and inferior rim of the abscess cavity.  It was 

densely adhesed to all surrounding structures.  We only removed what was 

absolutely met in order to get all the stool, and now pus evacuated out of the 

left tara-colic area beyond the sacral promontory.





We now approached the subtotal colectomy.  The terminal ileum, junction with the

cecum was now dissected out.  The cecum was actually down into the right 

hemipelvis.  The right colon was mobilized along the white line of Toldt.  

Terminal ileum cecum and right colon were brought medially in the standard 

fashion.





I now took down the hepatic flexure.  The patient was status post a 

cholecystectomy.  The proximal portion of the first colon came down nicely.  We 

 the greater omentum from the transverse colon along its entire length 

using the LigaSure dissection.  In order to facilitate the subtotal colectomy in

its entirety, decided to divide the terminal ileum just proximal to the 

ileocecal valve with the POLI 55 stapler.  A suitable site for transection of the

transverse colon, to the right of the middle colic vessels was chosen.  The 

transection was performed with the POLI 55 stapler.  The ileocolic, and right 

mesial colon were taken down between clamps and 0 Vicryl ties.  The right colon 

was handed off to pathology





We now changed position, and Dr. Schuster operated with the patient's right side

with the patient tilted with right side down left side up.  Bookwalter 

retractors repositioned.  We now explored the pelvis more formally.  The 

rectosigmoid colon was mobilized medially and cephalad.  It was densely inflamed

adhesed and the primary pathology appeared to be acute sigmoid diverticulitis 

with perforation.  We did not formally see the left ureter, however we stayed 

out of the pelvic retroperitoneal tissue.  We stayed close to the colon and open

up the left sided white line of Toldt.  We took this level dissection up to the 

mid left colon.  Again findings were significant for acute and chronic 

diverticulitis.  I elected to divide the left colon to ease the dissection.  

This was done with the POLI 55 stapler.  The sigmoid mesocolon was taken down 

between clamps and ties.  We elevated the rectosigmoid colon all the way down to

the true rectum.  There are pockets of pus along the tara-rectal spaces.  All of

these were broken up bluntly.  I got down below the inflammatory mass involving 

the sigmoid colon, and got down to the lower sacral promontory, and what would b

e the peritoneal reflection.  I divided the upper rectum with a single firing of

the contour Ethicon 45 mm stapler.  The rectosigmoid colon was now passed off to

pathology altogether with the same specimens previously resected.





We now called our attention to the remaining transverse colon, and splenic 

flexure.  Extension of the midline incision was performed so as to facilitate 

exposure of the left upper quadrant.  Unfortunately the transverse colon was 

eased to the anterior abdominal wall in the left upper quadrant.  A combination 

of blunt and electrocautery dissection was utilized in attempt to pull the colon

off the abdominal wall but it was so stuck that it busted open.  In fact after 

decompressing all of this contaminated stool which spilled into the left upper 

quadrant, I began resecting the distal transverse colon in a piecemeal fashion 

using the LigaSure device.  Eventually I worked both the distal transverse 

colon, and the proximal descending colon staying right on the colon resecting 

bowel only.  Eventually I had the entire colon removed, however it was apparent 

that the patient had a previous perforation in the left upper quadrant and there

was inflammatory peel very similar to the inflammatory abscess in the pelvis was

in fact involving the left diaphragm.  I peeled as much of the inflammatory peel

off of the diaphragm as I could.  This was all anterior to the spleen.  Once I 

had 75% of the peel off of the diaphragm, we stopped debridement.  We now 

irrigated the entire peritoneal cavity out with approximately 5 L of warm saline

getting rid of all of the stool which in fact a significant amount had spilled 

in the left upper quadrant.  I was careful to get the stool from around the 

spleen as best I could.





We now ran the small bowel to ensure there were no other adhesions or 

enterotomies.  2 small serosal tears were oversewed with a 3-0 Vicryl suture.  

We now completed the ileoileostomy from the previous small bowel resection.  

This was a functional end-to-end, side-to-side anastomosis completed using POLI 

55 stapler and TA 60 linear proximate stapler for closure of the enterotomies.  

Stenosis was patent and inspected prior to final closure.  There was no evidence

of tension.  The terminal ileum was now to be brought out as an iliac ostomy.  A

suitable site for this was chosen just adjacent and cephalad to the umbilicus on

the right side.  Skin and subcutaneous tissue was opened with electrocautery, 

and a vertically oriented incision was made in the fascia with electrocautery.  

The terminal ileum was brought through the intra-abdominal wall tension.





We reirrigated the patient's peritoneal cavity multiple times, placed large 

Juan drains one in the right lower quadrant with its terminal point ending in 1

of the deep pockets to the left of the patient's rectal stump and anteriorly, a 

pocket that it previously contains stool and pus.  The second left lower 

quadrant drain was placed with its terminus in the left paracolic gutter.  The 

left upper quadrant drain was placed with its terminal and adjacent to the 

spleen in the subdiaphragmatic space.  All drains were secured to skin with 2-0 

Prolene suture





Once again ran the small bowel again and ensured its viability, and integrity, 

with no evidence of chemical bleeding or leakage.  Sponge and needle counts are 

correct.  Of note the patient remained hypotensive during the latter third of 

the case, now receiving blood products additional crystalloid, and Tr-

Synephrine.





Midline incisions closed with 2 double-stranded #1 PDS sutures, skin 

approximated with staples of the umbilicus and packed open.  Drains hooked to 

bulb suction, and ileostomy matured with 4-0 Vicryl suture.  Of note I did make 

a small transverse incision in the fascia lateral to the ileostomy to ensure 

smooth transition through the fascial planes.  The ileostomy was viable at the 

conclusion of the case.  Ileostomy appliance applied, honeycomb dressing 

applied.  Patient tolerated the procedure reasonably well, however she was 

clearly in septic shock at this point.  She was taken to the intensive care unit

in guarded condition.





Again, extremely difficult modifier use due to prolonged time, approximately 4.0

hours.

## 2019-02-12 LAB
ABSOLUTE LYMPHOCYTES# (MANUAL): 1.2 10^3/UL (ref 0.5–4.7)
ABSOLUTE MONOCYTES # (MANUAL): 0.5 10^3/UL (ref 0.1–1.4)
ABSOLUTE NEUTROPHILS# (MANUAL): 21.9 10^3/UL (ref 1.7–8.2)
ADD MANUAL DIFF: YES
ALBUMIN SERPL-MCNC: 1.8 G/DL (ref 3.5–5)
ALBUMIN SERPL-MCNC: 2.1 G/DL (ref 3.5–5)
ALP SERPL-CCNC: 136 U/L (ref 38–126)
ALP SERPL-CCNC: 193 U/L (ref 38–126)
ALT SERPL-CCNC: 6055 U/L (ref 9–52)
ANION GAP SERPL CALC-SCNC: 12 MMOL/L (ref 5–19)
ANION GAP SERPL CALC-SCNC: 15 MMOL/L (ref 5–19)
ANION GAP SERPL CALC-SCNC: 6 MMOL/L (ref 5–19)
ANISOCYTOSIS BLD QL SMEAR: (no result)
APTT BLD: 44.3 SEC (ref 23.5–35.8)
ARTERIAL BLOOD FIO2: (no result)
ARTERIAL BLOOD H2CO3: 0.68 MMOL/L (ref 1.05–1.35)
ARTERIAL BLOOD H2CO3: 0.68 MMOL/L (ref 1.05–1.35)
ARTERIAL BLOOD H2CO3: 0.85 MMOL/L (ref 1.05–1.35)
ARTERIAL BLOOD H2CO3: 0.97 MMOL/L (ref 1.05–1.35)
ARTERIAL BLOOD HCO3: 16.4 MMOL/L (ref 20–24)
ARTERIAL BLOOD HCO3: 21.2 MMOL/L (ref 20–24)
ARTERIAL BLOOD HCO3: 22.8 MMOL/L (ref 20–24)
ARTERIAL BLOOD HCO3: 23.5 MMOL/L (ref 20–24)
ARTERIAL BLOOD PCO2: 22.6 MMHG (ref 35–45)
ARTERIAL BLOOD PCO2: 22.7 MMHG (ref 35–45)
ARTERIAL BLOOD PCO2: 28.1 MMHG (ref 35–45)
ARTERIAL BLOOD PCO2: 32.3 MMHG (ref 35–45)
ARTERIAL BLOOD PH: 7.39 (ref 7.35–7.45)
ARTERIAL BLOOD PH: 7.47 (ref 7.35–7.45)
ARTERIAL BLOOD PH: 7.59 (ref 7.35–7.45)
ARTERIAL BLOOD PH: 7.64 (ref 7.35–7.45)
ARTERIAL BLOOD PO2: 108.8 MMHG (ref 80–100)
ARTERIAL BLOOD PO2: 113.4 MMHG (ref 80–100)
ARTERIAL BLOOD PO2: 149.5 MMHG (ref 80–100)
ARTERIAL BLOOD PO2: 84.6 MMHG (ref 80–100)
ARTERIAL BLOOD TOTAL CO2: 17.3 MMOL/L (ref 21–25)
ARTERIAL BLOOD TOTAL CO2: 21.9 MMOL/L (ref 21–25)
ARTERIAL BLOOD TOTAL CO2: 23.8 MMOL/L (ref 21–25)
ARTERIAL BLOOD TOTAL CO2: 24.2 MMOL/L (ref 21–25)
AST SERPL-CCNC: (no result) U/L (ref 14–36)
BASE EXCESS BLDA CALC-SCNC: -0.9 MMOL/L
BASE EXCESS BLDA CALC-SCNC: -7.6 MMOL/L
BASE EXCESS BLDA CALC-SCNC: 0.2 MMOL/L
BASE EXCESS BLDA CALC-SCNC: 2.4 MMOL/L
BASOPHILS NFR BLD MANUAL: 0 % (ref 0–2)
BILIRUB DIRECT SERPL-MCNC: 1.6 MG/DL (ref 0–0.4)
BILIRUB DIRECT SERPL-MCNC: 2 MG/DL (ref 0–0.4)
BILIRUB SERPL-MCNC: 2.4 MG/DL (ref 0.2–1.3)
BILIRUB SERPL-MCNC: 4.4 MG/DL (ref 0.2–1.3)
BUN SERPL-MCNC: 11 MG/DL (ref 7–20)
BUN SERPL-MCNC: 14 MG/DL (ref 7–20)
BUN SERPL-MCNC: 15 MG/DL (ref 7–20)
CALCIUM: 6.8 MG/DL (ref 8.4–10.2)
CALCIUM: 7.1 MG/DL (ref 8.4–10.2)
CHLORIDE SERPL-SCNC: 103 MMOL/L (ref 98–107)
CHLORIDE SERPL-SCNC: 105 MMOL/L (ref 98–107)
CHLORIDE SERPL-SCNC: 105 MMOL/L (ref 98–107)
CO2 SERPL-SCNC: 19 MMOL/L (ref 22–30)
CO2 SERPL-SCNC: 22 MMOL/L (ref 22–30)
CO2 SERPL-SCNC: 25 MMOL/L (ref 22–30)
EOSINOPHIL NFR BLD MANUAL: 0 % (ref 0–6)
ERYTHROCYTE [DISTWIDTH] IN BLOOD BY AUTOMATED COUNT: 16.6 % (ref 11.5–14)
ERYTHROCYTE [DISTWIDTH] IN BLOOD BY AUTOMATED COUNT: 16.8 % (ref 11.5–14)
GLUCOSE SERPL-MCNC: 101 MG/DL (ref 75–110)
GLUCOSE SERPL-MCNC: 245 MG/DL (ref 75–110)
GLUCOSE SERPL-MCNC: 98 MG/DL (ref 75–110)
HCT VFR BLD CALC: 16.3 % (ref 36–47)
HCT VFR BLD CALC: 25.9 % (ref 36–47)
HGB BLD-MCNC: 8.8 G/DL (ref 12–15.5)
INR PPP: 3.41
MCH RBC QN AUTO: 31.4 PG (ref 27–33.4)
MCH RBC QN AUTO: 31.6 PG (ref 27–33.4)
MCHC RBC AUTO-ENTMCNC: 34.1 G/DL (ref 32–36)
MCHC RBC AUTO-ENTMCNC: 34.3 G/DL (ref 32–36)
MCV RBC AUTO: 92 FL (ref 80–97)
MCV RBC AUTO: 93 FL (ref 80–97)
MONOCYTES % (MANUAL): 2 % (ref 3–13)
PATH REV BLD -IMP: (no result)
PLATELET # BLD: 157 10^3/UL (ref 150–450)
PLATELET COMMENT: ADEQUATE
POLYCHROMASIA BLD QL SMEAR: (no result)
POTASSIUM SERPL-SCNC: 3.6 MMOL/L (ref 3.6–5)
POTASSIUM SERPL-SCNC: 3.7 MMOL/L (ref 3.6–5)
POTASSIUM SERPL-SCNC: 4.6 MMOL/L (ref 3.6–5)
PROT SERPL-MCNC: 3 G/DL (ref 6.3–8.2)
PROT SERPL-MCNC: 3.3 G/DL (ref 6.3–8.2)
PROTHROMBIN TIME: 36 SEC (ref 11.4–15.4)
RBC # BLD AUTO: 1.77 10^6/UL (ref 3.72–5.28)
RBC # BLD AUTO: 2.79 10^6/UL (ref 3.72–5.28)
SAO2 % BLDA: 97.8 % (ref 94–98)
SAO2 % BLDA: 98.4 % (ref 94–98)
SAO2 % BLDA: 98.8 % (ref 94–98)
SAO2 % BLDA: 98.9 % (ref 94–98)
SEGMENTED NEUTROPHILS % (MAN): 93 % (ref 42–78)
SODIUM SERPL-SCNC: 136 MMOL/L (ref 137–145)
SODIUM SERPL-SCNC: 137 MMOL/L (ref 137–145)
SODIUM SERPL-SCNC: 139.4 MMOL/L (ref 137–145)
TOTAL CELLS COUNTED BLD: 100
VARIANT LYMPHS NFR BLD MANUAL: 5 % (ref 13–45)
WBC # BLD AUTO: 23.6 10^3/UL (ref 4–10.5)

## 2019-02-12 RX ADMIN — HYDROCORTISONE SODIUM SUCCINATE SCH MG: 100 INJECTION, POWDER, FOR SOLUTION INTRAMUSCULAR; INTRAVENOUS at 09:47

## 2019-02-12 RX ADMIN — IPRATROPIUM BROMIDE AND ALBUTEROL SULFATE SCH ML: 2.5; .5 SOLUTION RESPIRATORY (INHALATION) at 20:43

## 2019-02-12 RX ADMIN — SODIUM CHLORIDE, SODIUM LACTATE, POTASSIUM CHLORIDE, AND CALCIUM CHLORIDE PRN MLS/HR: .6; .31; .03; .02 INJECTION, SOLUTION INTRAVENOUS at 14:47

## 2019-02-12 RX ADMIN — ALBUMIN (HUMAN) SCH MLS/HR: 12.5 SOLUTION INTRAVENOUS at 13:29

## 2019-02-12 RX ADMIN — DIPHENHYDRAMINE HYDROCHLORIDE SCH: 25 CAPSULE ORAL at 21:40

## 2019-02-12 RX ADMIN — SODIUM CHLORIDE, SODIUM LACTATE, POTASSIUM CHLORIDE, AND CALCIUM CHLORIDE PRN MLS/HR: .6; .31; .03; .02 INJECTION, SOLUTION INTRAVENOUS at 21:33

## 2019-02-12 RX ADMIN — DEXTROSE PRN MLS/HR: 5 SOLUTION INTRAVENOUS at 16:26

## 2019-02-12 RX ADMIN — Medication SCH: at 21:23

## 2019-02-12 RX ADMIN — PHENYLEPHRINE HYDROCHLORIDE PRN MLS/HR: 10 INJECTION INTRAVENOUS at 00:20

## 2019-02-12 RX ADMIN — SODIUM CHLORIDE, SODIUM LACTATE, POTASSIUM CHLORIDE, AND CALCIUM CHLORIDE PRN MLS/HR: .6; .31; .03; .02 INJECTION, SOLUTION INTRAVENOUS at 01:40

## 2019-02-12 RX ADMIN — FLUOXETINE SCH: 20 CAPSULE ORAL at 09:57

## 2019-02-12 RX ADMIN — AMPICILLIN SODIUM AND SULBACTAM SODIUM SCH MLS/HR: 2; 1 INJECTION, POWDER, FOR SOLUTION INTRAMUSCULAR; INTRAVENOUS at 15:10

## 2019-02-12 RX ADMIN — HYDROCORTISONE SODIUM SUCCINATE SCH MG: 100 INJECTION, POWDER, FOR SOLUTION INTRAMUSCULAR; INTRAVENOUS at 17:22

## 2019-02-12 RX ADMIN — MIDAZOLAM HYDROCHLORIDE PRN MLS/HR: 5 INJECTION, SOLUTION INTRAMUSCULAR; INTRAVENOUS at 09:23

## 2019-02-12 RX ADMIN — AMPICILLIN SODIUM AND SULBACTAM SODIUM SCH MLS/HR: 2; 1 INJECTION, POWDER, FOR SOLUTION INTRAMUSCULAR; INTRAVENOUS at 06:25

## 2019-02-12 RX ADMIN — METRONIDAZOLE SCH MLS/HR: 500 INJECTION, SOLUTION INTRAVENOUS at 17:22

## 2019-02-12 RX ADMIN — DOPAMINE HYDROCHLORIDE PRN MLS/HR: 320 INJECTION, SOLUTION INTRAVENOUS at 00:22

## 2019-02-12 RX ADMIN — IPRATROPIUM BROMIDE AND ALBUTEROL SULFATE SCH ML: 2.5; .5 SOLUTION RESPIRATORY (INHALATION) at 13:44

## 2019-02-12 RX ADMIN — GABAPENTIN SCH: 300 CAPSULE ORAL at 09:57

## 2019-02-12 RX ADMIN — GABAPENTIN SCH: 300 CAPSULE ORAL at 21:40

## 2019-02-12 RX ADMIN — METRONIDAZOLE SCH MLS/HR: 500 INJECTION, SOLUTION INTRAVENOUS at 12:43

## 2019-02-12 RX ADMIN — Medication SCH: at 05:00

## 2019-02-12 RX ADMIN — ALBUMIN (HUMAN) SCH MLS/HR: 12.5 SOLUTION INTRAVENOUS at 01:22

## 2019-02-12 RX ADMIN — AMPICILLIN SODIUM AND SULBACTAM SODIUM SCH MLS/HR: 2; 1 INJECTION, POWDER, FOR SOLUTION INTRAMUSCULAR; INTRAVENOUS at 21:41

## 2019-02-12 RX ADMIN — SODIUM CHLORIDE PRN MLS/HR: 9 INJECTION, SOLUTION INTRAVENOUS at 15:15

## 2019-02-12 RX ADMIN — Medication SCH: at 21:24

## 2019-02-12 RX ADMIN — ALBUMIN (HUMAN) SCH MLS/HR: 12.5 SOLUTION INTRAVENOUS at 12:43

## 2019-02-12 RX ADMIN — SODIUM CHLORIDE PRN MLS/HR: 9 INJECTION, SOLUTION INTRAVENOUS at 19:04

## 2019-02-12 RX ADMIN — MORPHINE SULFATE PRN MG: 10 INJECTION INTRAMUSCULAR; INTRAVENOUS; SUBCUTANEOUS at 09:05

## 2019-02-12 RX ADMIN — METRONIDAZOLE SCH MLS/HR: 500 INJECTION, SOLUTION INTRAVENOUS at 23:02

## 2019-02-12 RX ADMIN — MIDAZOLAM HYDROCHLORIDE PRN MLS/HR: 5 INJECTION, SOLUTION INTRAMUSCULAR; INTRAVENOUS at 16:27

## 2019-02-12 RX ADMIN — Medication SCH: at 14:49

## 2019-02-12 RX ADMIN — ALBUMIN (HUMAN) SCH MLS/HR: 12.5 SOLUTION INTRAVENOUS at 14:49

## 2019-02-12 RX ADMIN — ALBUMIN (HUMAN) SCH MLS/HR: 12.5 SOLUTION INTRAVENOUS at 00:20

## 2019-02-12 RX ADMIN — ALBUMIN (HUMAN) SCH MLS/HR: 12.5 SOLUTION INTRAVENOUS at 17:22

## 2019-02-12 RX ADMIN — METOCLOPRAMIDE SCH MLS/HR: 5 INJECTION, SOLUTION INTRAMUSCULAR; INTRAVENOUS at 18:18

## 2019-02-12 RX ADMIN — PHENYLEPHRINE HYDROCHLORIDE PRN MLS/HR: 10 INJECTION INTRAVENOUS at 04:41

## 2019-02-12 RX ADMIN — SODIUM CHLORIDE, SODIUM LACTATE, POTASSIUM CHLORIDE, AND CALCIUM CHLORIDE PRN MLS/HR: .6; .31; .03; .02 INJECTION, SOLUTION INTRAVENOUS at 09:23

## 2019-02-12 RX ADMIN — ALBUMIN (HUMAN) SCH MLS/HR: 12.5 SOLUTION INTRAVENOUS at 16:19

## 2019-02-12 RX ADMIN — METRONIDAZOLE SCH MLS/HR: 500 INJECTION, SOLUTION INTRAVENOUS at 00:59

## 2019-02-12 RX ADMIN — ALBUMIN (HUMAN) SCH MLS/HR: 12.5 SOLUTION INTRAVENOUS at 16:27

## 2019-02-12 RX ADMIN — METRONIDAZOLE SCH MLS/HR: 500 INJECTION, SOLUTION INTRAVENOUS at 05:00

## 2019-02-12 RX ADMIN — ALBUMIN (HUMAN) SCH MLS/HR: 12.5 SOLUTION INTRAVENOUS at 18:18

## 2019-02-12 RX ADMIN — ENOXAPARIN SODIUM SCH: 40 INJECTION SUBCUTANEOUS at 09:25

## 2019-02-12 NOTE — PDOC PROGRESS REPORT
Subjective


Progress Note for:: 02/12/19


Reason For Visit: 


DIVERTICULITIS,SIGMOID AND DESCENDING COLON,INTRA-








Physical Exam


Vital Signs: 


                                        











Temp Pulse Resp BP Pulse Ox


 


 96.9 F L  97   20   114/71   100 


 


 02/12/19 18:00  02/12/19 18:00  02/12/19 18:00  02/12/19 18:00  02/12/19 18:00








                                 Intake & Output











 02/11/19 02/12/19 02/13/19





 06:59 06:59 06:59


 


Intake Total 1488 [id number]


 


Output Total 60 9992 917


 


Balance 1428 0216 2920


 


Weight  83.5 kg 














Results


Laboratory Results: 


                                        





                                 02/12/19 03:54 





                                 02/12/19 03:54 





                                        











  02/10/19 02/11/19 02/11/19





  15:10 21:45 21:45


 


WBC   32.4 H* 


 


RBC   2.91 L 


 


Hgb   9.2 L 


 


Hct   28.1 L 


 


MCV   96 


 


MCH   31.5 


 


MCHC   32.7 


 


RDW   17.4 H 


 


Plt Count   279 


 


Seg Neutrophils %   Not Reportable 


 


Lymphocytes %   Not Reportable 


 


Monocytes %   Not Reportable 


 


Eosinophils %   Not Reportable 


 


Basophils %   Not Reportable 


 


Absolute Neutrophils   Not Reportable 


 


Absolute Lymphocytes   Not Reportable 


 


Absolute Monocytes   Not Reportable 


 


Absolute Eosinophils   Not Reportable 


 


Absolute Basophils   Not Reportable 


 


Carbonic Acid   


 


HCO3/H2CO3 Ratio   


 


ABG pH   


 


ABG pCO2   


 


ABG pO2   


 


ABG HCO3   


 


ABG O2 Saturation   


 


ABG Base Excess   


 


FiO2   


 


Sodium    134.2 L


 


Potassium    4.7


 


Chloride    103


 


Carbon Dioxide    14 L


 


Anion Gap    17


 


BUN    9


 


Creatinine    0.40 L


 


Est GFR ( Amer)    > 60


 


Est GFR (Non-Af Amer)    > 60


 


Glucose    266 H


 


Lactic Acid   


 


Calcium    6.5 L*


 


Magnesium    2.7 H D


 


Total Bilirubin    1.9 H


 


AST    3000 H


 


ALT    2404 H


 


Alkaline Phosphatase    186 H


 


Total Protein    3.3 L


 


Albumin    1.9 L


 


Blood Type  O POSITIVE  


 


Antibody Screen  NEGATIVE  














  02/11/19 02/12/19 02/12/19





  21:53 03:54 03:54


 


WBC   23.6 H 


 


RBC   2.79 L 


 


Hgb   8.8 L 


 


Hct   25.9 L 


 


MCV   93 


 


MCH   31.6 


 


MCHC   34.1 


 


RDW   16.8 H 


 


Plt Count   157 


 


Seg Neutrophils %   Not Reportable 


 


Lymphocytes %   Not Reportable 


 


Monocytes %   Not Reportable 


 


Eosinophils %   Not Reportable 


 


Basophils %   Not Reportable 


 


Absolute Neutrophils   Not Reportable 


 


Absolute Lymphocytes   Not Reportable 


 


Absolute Monocytes   Not Reportable 


 


Absolute Eosinophils   Not Reportable 


 


Absolute Basophils   Not Reportable 


 


Carbonic Acid  0.99 L  


 


HCO3/H2CO3 Ratio  10:1  


 


ABG pH  7.10 L*  


 


ABG pCO2  33.0 L  


 


ABG pO2  481.2 H  


 


ABG HCO3  10.1 L  


 


ABG O2 Saturation  99.8 H  


 


ABG Base Excess  -18.3  


 


FiO2  100%  


 


Sodium    139.4


 


Potassium    4.6


 


Chloride    105


 


Carbon Dioxide    19 L


 


Anion Gap    15


 


BUN    11


 


Creatinine    0.55


 


Est GFR ( Amer)    > 60


 


Est GFR (Non-Af Amer)    > 60


 


Glucose    245 H


 


Lactic Acid   


 


Calcium    6.8 L*


 


Magnesium    2.4 H


 


Total Bilirubin    2.4 H


 


AST    57360 H


 


ALT    6055 H


 


Alkaline Phosphatase    193 H


 


Total Protein    3.3 L


 


Albumin    2.1 L


 


Blood Type   


 


Antibody Screen   














  02/12/19 02/12/19 02/12/19





  03:54 09:48 10:40


 


WBC   


 


RBC   


 


Hgb   


 


Hct   


 


MCV   


 


MCH   


 


MCHC   


 


RDW   


 


Plt Count   


 


Seg Neutrophils %   


 


Lymphocytes %   


 


Monocytes %   


 


Eosinophils %   


 


Basophils %   


 


Absolute Neutrophils   


 


Absolute Lymphocytes   


 


Absolute Monocytes   


 


Absolute Eosinophils   


 


Absolute Basophils   


 


Carbonic Acid  0.85 L   0.68 L


 


HCO3/H2CO3 Ratio  19:1   31:1


 


ABG pH  7.39   7.59 H


 


ABG pCO2  28.1 L   22.7 L


 


ABG pO2  149.5 H   84.6


 


ABG HCO3  16.4 L   21.2


 


ABG O2 Saturation  98.9 H   97.8


 


ABG Base Excess  -7.6   0.2


 


FiO2  40%   30%


 


Sodium   


 


Potassium   


 


Chloride   


 


Carbon Dioxide   


 


Anion Gap   


 


BUN   


 


Creatinine   


 


Est GFR ( Amer)   


 


Est GFR (Non-Af Amer)   


 


Glucose   


 


Lactic Acid   6.0 H 


 


Calcium   


 


Magnesium   


 


Total Bilirubin   


 


AST   


 


ALT   


 


Alkaline Phosphatase   


 


Total Protein   


 


Albumin   


 


Blood Type   


 


Antibody Screen   














  02/12/19





  14:40


 


WBC 


 


RBC 


 


Hgb 


 


Hct 


 


MCV 


 


MCH 


 


MCHC 


 


RDW 


 


Plt Count 


 


Seg Neutrophils % 


 


Lymphocytes % 


 


Monocytes % 


 


Eosinophils % 


 


Basophils % 


 


Absolute Neutrophils 


 


Absolute Lymphocytes 


 


Absolute Monocytes 


 


Absolute Eosinophils 


 


Absolute Basophils 


 


Carbonic Acid 


 


HCO3/H2CO3 Ratio 


 


ABG pH 


 


ABG pCO2 


 


ABG pO2 


 


ABG HCO3 


 


ABG O2 Saturation 


 


ABG Base Excess 


 


FiO2 


 


Sodium 


 


Potassium 


 


Chloride 


 


Carbon Dioxide 


 


Anion Gap 


 


BUN 


 


Creatinine 


 


Est GFR ( Amer) 


 


Est GFR (Non-Af Amer) 


 


Glucose 


 


Lactic Acid  5.2 H


 


Calcium 


 


Magnesium 


 


Total Bilirubin 


 


AST 


 


ALT 


 


Alkaline Phosphatase 


 


Total Protein 


 


Albumin 


 


Blood Type 


 


Antibody Screen 








                                        





02/06/19 18:45   Blood   Blood Culture - Final


                            NO GROWTH IN 5 DAYS








Impressions: 


                                        





Abdomen/Pelvis CT  02/06/19 00:00


IMPRESSION:  Diverticulitis of the descending colon and sigmoid colon.  No 

definitive abscess or perforation.


Pigtail catheter in the lower abdomen.


 








Abdomen Ultrasound  02/07/19 00:00


IMPRESSION:


 


No acute sonographic abnormality.


 








Catheter Patency X-Ray  02/08/19 00:00


IMPRESSION:  SMALL RESIDUAL ABSCESS CAVITY.  FISTULOUS COMMUNICATION WITH THE 

DISTAL DESCENDING COLON WITH COLONIC FINDINGS AS DESCRIBED ABOVE.


 








Fluoroscopy  02/08/19 00:00


IMPRESSION:  SMALL RESIDUAL ABSCESS CAVITY.  FISTULOUS COMMUNICATION WITH THE 

DISTAL DESCENDING COLON WITH COLONIC FINDINGS AS DESCRIBED ABOVE.


 








Chest X-Ray  02/11/19 00:00


IMPRESSION:  1.  Interval placement of endotracheal and nasogastric tubes, since

the prior examination dated 2/6/2019.


2.  New finding of left base infiltrate/atelectasis.  Linear subsegmental 

atelectasis at the right lung base.


3.  Additional findings, see as above.


 














Assessment & Plan





- Plan Summary


Plan Summary: 





The patient was initially seen by me at 0700 this morning.  Her blood pressure 

was elevated, and CVP was approximately 10.  Plan was to wean pressors for a map

of 65-70.  Over the course of the day, the patient's urine output has dropped.  

I was not notified.  Lactic acids were checked and were significantly elevated, 

and I was again not notified.  The patient is severely septic.  Her CVP is 

approximately 6, her urine output is inadequate.  Her map is currently 63.





The patient still requires significant resuscitation.  1 L bolus now.  Stat 

lactic acid and BMP.  The patient's CVP should be maintained between 8 and 12 at

all times.  Her map should be maintained above 65.  Levophed or dopamine should 

be used to augment her mean arterial pressure, when necessary.  I have 

instructed the nursing staff there to call me immediately if the patient's urine

output drops below 30 cc/h.  Continue aggressive fluid resuscitation.  Will 

follow.

## 2019-02-12 NOTE — PDOC PROGRESS REPORT
Subjective


Progress Note for:: 02/12/19


Subjective:: 





Patient remains sedated and intubated.  Came to the ICU yesterday after partial 

colectomy with an ileostomy.  Patient has a history of ulcerative colitis with 

multiple episodes of intra-abdominal abscesses.  By report she had previously 

had a percutaneous drain placed instead of surgery, and then stool started 

coming out of that drain.  She was on 4 pressors after surgery yesterday and got

about 9 L of fluid plus a couple units of packed red blood cells, and had as of 

this morning not put out much urine at all, but this morning her blood pressure 

had improved to the point that she was only on a small dose of dopamine.  

Pressure went back down as the day went on and she had to go back on vasopressin

at one point.  We started giving her Lasix and albumin together and she has had 

a little bit of urine output since then.


Reason For Visit: 


DIVERTICULITIS,SIGMOID AND DESCENDING COLON,INTRA-








Physical Exam


Vital Signs: 


                                        











Temp Pulse Resp BP Pulse Ox


 


 96.9 F L  97   20   114/71   100 


 


 02/12/19 18:00  02/12/19 18:00  02/12/19 18:00  02/12/19 18:00  02/12/19 18:00








                                 Intake & Output











 02/11/19 02/12/19 02/13/19





 06:59 06:59 06:59


 


Intake Total 1488 [id number] 3837


 


Output Total 60 9992 917


 


Balance 1428 7309 2920


 


Weight  83.5 kg 











General appearance: PRESENT: severe distress, other - Sedated, intubated


Respiratory exam: PRESENT: crackles - Bilateral, symmetrical, unlabored.  

ABSENT: prolonged expiratory phas, rhonchi, tachypnea, wheezes


Cardiovascular exam: PRESENT: +S1, +S2, tachycardia


Vascular exam: ABSENT: normal capillary refill - Capillary refill was 6 seconds


GI/Abdominal exam: PRESENT: diminished bowel sounds, distended, soft, other - 

Midline abdominal decision was covered with a bulky dressing, she had 3 DAY dr lockwood.  ABSENT: rebound, tenderness


Extremities exam: PRESENT: pedal edema, other - She has 3+ or greater 

generalized pitting edema in her upper and lower extremities as well as diffuse 

anasarca.  ABSENT: clubbing


Musculoskeletal exam: ABSENT: deformity, tenderness


Neurological exam: PRESENT: other - Sedated, intubated


Skin exam: PRESENT: jaundice, mottled.  ABSENT: warm





Results


Laboratory Results: 


                                        





                                 02/12/19 03:54 





                                 02/12/19 03:54 





                                        











  02/10/19 02/11/19 02/11/19





  15:10 21:45 21:45


 


WBC   32.4 H* 


 


RBC   2.91 L 


 


Hgb   9.2 L 


 


Hct   28.1 L 


 


MCV   96 


 


MCH   31.5 


 


MCHC   32.7 


 


RDW   17.4 H 


 


Plt Count   279 


 


Seg Neutrophils %   Not Reportable 


 


Lymphocytes %   Not Reportable 


 


Monocytes %   Not Reportable 


 


Eosinophils %   Not Reportable 


 


Basophils %   Not Reportable 


 


Absolute Neutrophils   Not Reportable 


 


Absolute Lymphocytes   Not Reportable 


 


Absolute Monocytes   Not Reportable 


 


Absolute Eosinophils   Not Reportable 


 


Absolute Basophils   Not Reportable 


 


Carbonic Acid   


 


HCO3/H2CO3 Ratio   


 


ABG pH   


 


ABG pCO2   


 


ABG pO2   


 


ABG HCO3   


 


ABG O2 Saturation   


 


ABG Base Excess   


 


FiO2   


 


Sodium    134.2 L


 


Potassium    4.7


 


Chloride    103


 


Carbon Dioxide    14 L


 


Anion Gap    17


 


BUN    9


 


Creatinine    0.40 L


 


Est GFR ( Amer)    > 60


 


Est GFR (Non-Af Amer)    > 60


 


Glucose    266 H


 


Lactic Acid   


 


Calcium    6.5 L*


 


Magnesium    2.7 H D


 


Total Bilirubin    1.9 H


 


AST    3000 H


 


ALT    2404 H


 


Alkaline Phosphatase    186 H


 


Total Protein    3.3 L


 


Albumin    1.9 L


 


Blood Type  O POSITIVE  


 


Antibody Screen  NEGATIVE  














  02/11/19 02/12/19 02/12/19





  21:53 03:54 03:54


 


WBC   23.6 H 


 


RBC   2.79 L 


 


Hgb   8.8 L 


 


Hct   25.9 L 


 


MCV   93 


 


MCH   31.6 


 


MCHC   34.1 


 


RDW   16.8 H 


 


Plt Count   157 


 


Seg Neutrophils %   Not Reportable 


 


Lymphocytes %   Not Reportable 


 


Monocytes %   Not Reportable 


 


Eosinophils %   Not Reportable 


 


Basophils %   Not Reportable 


 


Absolute Neutrophils   Not Reportable 


 


Absolute Lymphocytes   Not Reportable 


 


Absolute Monocytes   Not Reportable 


 


Absolute Eosinophils   Not Reportable 


 


Absolute Basophils   Not Reportable 


 


Carbonic Acid  0.99 L  


 


HCO3/H2CO3 Ratio  10:1  


 


ABG pH  7.10 L*  


 


ABG pCO2  33.0 L  


 


ABG pO2  481.2 H  


 


ABG HCO3  10.1 L  


 


ABG O2 Saturation  99.8 H  


 


ABG Base Excess  -18.3  


 


FiO2  100%  


 


Sodium    139.4


 


Potassium    4.6


 


Chloride    105


 


Carbon Dioxide    19 L


 


Anion Gap    15


 


BUN    11


 


Creatinine    0.55


 


Est GFR ( Amer)    > 60


 


Est GFR (Non-Af Amer)    > 60


 


Glucose    245 H


 


Lactic Acid   


 


Calcium    6.8 L*


 


Magnesium    2.4 H


 


Total Bilirubin    2.4 H


 


AST    67248 H


 


ALT    6055 H


 


Alkaline Phosphatase    193 H


 


Total Protein    3.3 L


 


Albumin    2.1 L


 


Blood Type   


 


Antibody Screen   














  02/12/19 02/12/19 02/12/19





  03:54 09:48 10:40


 


WBC   


 


RBC   


 


Hgb   


 


Hct   


 


MCV   


 


MCH   


 


MCHC   


 


RDW   


 


Plt Count   


 


Seg Neutrophils %   


 


Lymphocytes %   


 


Monocytes %   


 


Eosinophils %   


 


Basophils %   


 


Absolute Neutrophils   


 


Absolute Lymphocytes   


 


Absolute Monocytes   


 


Absolute Eosinophils   


 


Absolute Basophils   


 


Carbonic Acid  0.85 L   0.68 L


 


HCO3/H2CO3 Ratio  19:1   31:1


 


ABG pH  7.39   7.59 H


 


ABG pCO2  28.1 L   22.7 L


 


ABG pO2  149.5 H   84.6


 


ABG HCO3  16.4 L   21.2


 


ABG O2 Saturation  98.9 H   97.8


 


ABG Base Excess  -7.6   0.2


 


FiO2  40%   30%


 


Sodium   


 


Potassium   


 


Chloride   


 


Carbon Dioxide   


 


Anion Gap   


 


BUN   


 


Creatinine   


 


Est GFR ( Amer)   


 


Est GFR (Non-Af Amer)   


 


Glucose   


 


Lactic Acid   6.0 H 


 


Calcium   


 


Magnesium   


 


Total Bilirubin   


 


AST   


 


ALT   


 


Alkaline Phosphatase   


 


Total Protein   


 


Albumin   


 


Blood Type   


 


Antibody Screen   














  02/12/19





  14:40


 


WBC 


 


RBC 


 


Hgb 


 


Hct 


 


MCV 


 


MCH 


 


MCHC 


 


RDW 


 


Plt Count 


 


Seg Neutrophils % 


 


Lymphocytes % 


 


Monocytes % 


 


Eosinophils % 


 


Basophils % 


 


Absolute Neutrophils 


 


Absolute Lymphocytes 


 


Absolute Monocytes 


 


Absolute Eosinophils 


 


Absolute Basophils 


 


Carbonic Acid 


 


HCO3/H2CO3 Ratio 


 


ABG pH 


 


ABG pCO2 


 


ABG pO2 


 


ABG HCO3 


 


ABG O2 Saturation 


 


ABG Base Excess 


 


FiO2 


 


Sodium 


 


Potassium 


 


Chloride 


 


Carbon Dioxide 


 


Anion Gap 


 


BUN 


 


Creatinine 


 


Est GFR ( Amer) 


 


Est GFR (Non-Af Amer) 


 


Glucose 


 


Lactic Acid  5.2 H


 


Calcium 


 


Magnesium 


 


Total Bilirubin 


 


AST 


 


ALT 


 


Alkaline Phosphatase 


 


Total Protein 


 


Albumin 


 


Blood Type 


 


Antibody Screen 








                                        





02/06/19 18:45   Blood   Blood Culture - Final


                            NO GROWTH IN 5 DAYS








Impressions: 


                                        





Abdomen/Pelvis CT  02/06/19 00:00


IMPRESSION:  Diverticulitis of the descending colon and sigmoid colon.  No 

definitive abscess or perforation.


Pigtail catheter in the lower abdomen.


 








Abdomen Ultrasound  02/07/19 00:00


IMPRESSION:


 


No acute sonographic abnormality.


 








Catheter Patency X-Ray  02/08/19 00:00


IMPRESSION:  SMALL RESIDUAL ABSCESS CAVITY.  FISTULOUS COMMUNICATION WITH THE 

DISTAL DESCENDING COLON WITH COLONIC FINDINGS AS DESCRIBED ABOVE.


 








Fluoroscopy  02/08/19 00:00


IMPRESSION:  SMALL RESIDUAL ABSCESS CAVITY.  FISTULOUS COMMUNICATION WITH THE 

DISTAL DESCENDING COLON WITH COLONIC FINDINGS AS DESCRIBED ABOVE.


 








Chest X-Ray  02/11/19 00:00


IMPRESSION:  1.  Interval placement of endotracheal and nasogastric tubes, since

the prior examination dated 2/6/2019.


2.  New finding of left base infiltrate/atelectasis.  Linear subsegmental ate

lectasis at the right lung base.


3.  Additional findings, see as above.


 














Assessment & Plan





- Diagnosis


(1) Septic shock


Is this a current diagnosis for this admission?: Yes   


Plan: 


She has evidence of multisystem organ failure, but there are some signs of 

improvement.  Her creatinine has improved and she has had improvement in her 

blood pressure.  We were able to get her to produce a little bit of urine today.

 She is on broad-spectrum antibiotic coverage for likely organisms.  Her liver 

enzymes have gotten worse and she was coagulopathic, but she is not showing any 

overt signs of bleeding at this point.  We will continue aggressive supportive 

care.








(2) Colonic fistula


Is this a current diagnosis for this admission?: Yes   


Plan: 


Management per surgery








- Time


Time Spent with patient: 35 or more minutes

## 2019-02-12 NOTE — PDOC CONSULTATION
Consultation


Consult Date: 02/11/19


Attending physician:: KRISTIN SCHUSTER


Consult reason:: Septic shock.  Acute hypoxic respiratory failure





History of Present Illness


Admission Date/PCP: 


  02/06/19 16:44





  DEBBIE GUTIERREZ DO





History of Present Illness: 


JARVIS WELCH is a 58 year old female with a history of ulcerative colitis.  She 

was recently hospitalized with an intra-abdominal abscess.  A surgical drain was

placed.  The patient went to a skilled facility but when she returned to the 

surgical clinic for follow-up there was obstruction with the drain.  She was 

sent to the emergency department for further interdiction.  When the obstruction

was relieved there was outflow of very purulent material.  The CT scan in the 

emergency department revealed diverticulitis and the patient was admitted.  She 

was started on antibiotic therapy.  On 11 February she went to the operating 

room for subtotal colectomy with colostomy.  Unfortunately the patient 

experienced shock, likely sepsis, and was transferred to the ICU.  Dr. Schuster 

requested a consult from the hospitalist service for medical management.





Past Medical History


Cardiac Medical History: 


   Denies: Coronary Artery Disease, Myocardial Infarction, Hypertension


Pulmonary Medical History: 


   Denies: Asthma, Bronchitis, Chronic Obstructive Pulmonary Disease (COPD), 

Pneumonia


Neurological Medical History: 


   Denies: Seizures


GI Medical History: 


   Denies: Hepatitis, Hiatal Hernia


Musculoskeltal Medical History: Reports: Arthritis - psoriatic


Skin Medical History: Reports: Psoriasis


Psychiatric Medical History: Reports: Depression


Hematology: 


   Denies: Anemia, Sickle Cell Disease





Past Surgical History


Past Surgical History: Reports: Cholecystectomy, Hysterectomy, Orthopedic Magallanes

rgery - right knee, left hand


   Denies: Amputation, Mastectomy





Social History


Lives with: Nursing Home - Presented from the skilled nursing facility but 

previously lived at home


Smoking Status: Former Smoker


Frequency of Alcohol Use: None


Drugs: None





- Advance Directive


Resuscitation Status: Full Code





Family History


Family History: Reviewed & Not Pertinent


Parental Family History Reviewed: No - Patient intubated and obtunded


Children Family History Reviewed: No - Patient intubated and obtunded


Sibling(s) Family History Reviewed.: No - Patient intubated and obtunded





Medication/Allergy


Home Medications: 








Fluoxetine HCl [Prozac] 40 mg PO DAILY 01/09/19 


Fluticasone Propionate [Flonase Nasal Spray 50 Mcg/Spray 16 gm] 2 spray NASL 

DAILYP PRN 01/09/19 


Gabapentin [Neurontin 300 mg Capsule] 300 mg PO DAILY 01/09/19 


Gabapentin [Neurontin 300 mg Capsule] 600 mg PO QHS 01/09/19 


Oxycodone HCl/Acetaminophen [Percocet 5-325 mg Tablet] 0.5 tab PO Q6HP PRN 

02/06/19 








Allergies/Adverse Reactions: 


                                        





pineapple Allergy (Mild, Verified 02/06/19 12:14)


   


latex [Latex] Allergy (Verified 02/06/19 12:14)


   itching


nickel [Nickel] Allergy (Verified 02/06/19 12:14)


   ITCHY RASH


sulfamethoxazole [From Bactrim] Allergy (Verified 02/06/19 12:14)


   red rash


trimethoprim [From Bactrim] Allergy (Verified 02/06/19 12:14)


   red rash


MERCURY Allergy (Uncoded 02/06/19 12:14)


   ITCHY RASH











Review of Systems


ROS unobtainable: Due to endotracheal tube





Physical Exam


Vital Signs: 


                                        











Temp Pulse Resp BP Pulse Ox


 


 97.7 F   131 H  19   40/16 L  100 


 


 02/11/19 16:00  02/11/19 18:00  02/11/19 18:02  02/11/19 18:02  02/11/19 16:00








                                 Intake & Output











 02/10/19 02/11/19 02/12/19





 06:59 06:59 06:59


 


Intake Total 640 1488 [id number]


 


Output Total  60 8700


 


Balance 640 1428 5596


 


Weight 72.5 kg  











General appearance: PRESENT: severe distress, other - Patient intubated and 

obtunded.  ABSENT: cooperative - Patient intubated and obtunded


Head exam: PRESENT: normocephalic


Eye exam: ABSENT: PERRLA - Pupils are reactive to light but sluggish.


Ear exam: PRESENT: normal external ear exam


Mouth exam: PRESENT: other - Endotracheal tube in place


Respiratory exam: PRESENT: clear to auscultation umu, other - Fully dependent on

ventilator.  ABSENT: rales, rhonchi, wheezes


Cardiovascular exam: PRESENT: tachycardia


Pulses: PRESENT: other - Unable to detect peripheral pulses


Vascular exam: PRESENT: other - Skin mottling systemically


GI/Abdominal exam: PRESENT: diminished bowel sounds, other - Dressing over the 

midline incision.  New colostomy left lower quadrant.  3 surgical drains in 

place with serosanguineous fluid.


Rectal exam: PRESENT: deferred


Gentrourinary exam: PRESENT: indwelling catheter


Extremities exam: PRESENT: +2 edema


Neurological exam: PRESENT: other - Obtunded


Psychiatric exam: PRESENT: other - Unable to assess


Focused psych exam: PRESENT: other - Obtunded


Skin exam: PRESENT: mottled, other - Midline incision with drains as above.





Results


Laboratory Results: 


                                        





                                 02/11/19 13:32 





                                 02/11/19 13:32 





                                        











  02/10/19 02/10/19 02/11/19





  15:10 22:45 07:00


 


WBC    12.1 H


 


RBC    2.82 L


 


Hgb    9.1 L


 


Hct    26.6 L


 


MCV    94


 


MCH    32.3


 


MCHC    34.2


 


RDW    19.3 H


 


Plt Count    366


 


Seg Neutrophils %    89.7 H


 


Lymphocytes %    7.3 L


 


Monocytes %    2.9 L


 


Eosinophils %    0.0


 


Basophils %    0.1


 


Absolute Neutrophils    10.9 H


 


Absolute Lymphocytes    0.9


 


Absolute Monocytes    0.4


 


Absolute Eosinophils    0.0


 


Absolute Basophils    0.0


 


Carbonic Acid   


 


HCO3/H2CO3 Ratio   


 


ABG pH   


 


ABG pCO2   


 


ABG pO2   


 


ABG HCO3   


 


ABG O2 Saturation   


 


ABG Base Excess   


 


FiO2   


 


Sodium   137.3 


 


Potassium   3.0 L* 


 


Chloride   101 


 


Carbon Dioxide   31 H 


 


Anion Gap   5 


 


BUN   8 


 


Creatinine   0.23 L 


 


Est GFR ( Amer)   > 60 


 


Est GFR (Non-Af Amer)   > 60 


 


Glucose   110 


 


Calcium   6.9 L* 


 


Magnesium   


 


Total Bilirubin   


 


AST   


 


ALT   


 


Alkaline Phosphatase   


 


Total Protein   


 


Albumin   


 


Blood Type  O POSITIVE  


 


Antibody Screen  NEGATIVE  














  02/11/19 02/11/19 02/11/19





  07:00 13:10 13:32


 


WBC    20.0 H


 


RBC    3.25 L


 


Hgb    10.1 L


 


Hct    30.5 L


 


MCV    94


 


MCH    31.0


 


MCHC    33.0


 


RDW    17.2 H


 


Plt Count    344


 


Seg Neutrophils %    Not Reportable


 


Lymphocytes %    Not Reportable


 


Monocytes %    Not Reportable


 


Eosinophils %    Not Reportable


 


Basophils %    Not Reportable


 


Absolute Neutrophils    Not Reportable


 


Absolute Lymphocytes    Not Reportable


 


Absolute Monocytes    Not Reportable


 


Absolute Eosinophils    Not Reportable


 


Absolute Basophils    Not Reportable


 


Carbonic Acid   1.45 H 


 


HCO3/H2CO3 Ratio   11:1 


 


ABG pH   7.17 L* 


 


ABG pCO2   48.2 H 


 


ABG pO2   150.8 H 


 


ABG HCO3   17.3 L 


 


ABG O2 Saturation   98.4 H 


 


ABG Base Excess   -10.8 


 


FiO2   40% 


 


Sodium  138.9  


 


Potassium  4.4  D  


 


Chloride  104  


 


Carbon Dioxide  30  


 


Anion Gap  5  


 


BUN  10  


 


Creatinine  0.19 L  


 


Est GFR ( Amer)  > 60  


 


Est GFR (Non-Af Amer)  > 60  


 


Glucose  116 H  


 


Calcium  7.2 L  


 


Magnesium   


 


Total Bilirubin   


 


AST   


 


ALT   


 


Alkaline Phosphatase   


 


Total Protein   


 


Albumin   


 


Blood Type   


 


Antibody Screen   














  02/11/19





  13:32


 


WBC 


 


RBC 


 


Hgb 


 


Hct 


 


MCV 


 


MCH 


 


MCHC 


 


RDW 


 


Plt Count 


 


Seg Neutrophils % 


 


Lymphocytes % 


 


Monocytes % 


 


Eosinophils % 


 


Basophils % 


 


Absolute Neutrophils 


 


Absolute Lymphocytes 


 


Absolute Monocytes 


 


Absolute Eosinophils 


 


Absolute Basophils 


 


Carbonic Acid 


 


HCO3/H2CO3 Ratio 


 


ABG pH 


 


ABG pCO2 


 


ABG pO2 


 


ABG HCO3 


 


ABG O2 Saturation 


 


ABG Base Excess 


 


FiO2 


 


Sodium  137.6


 


Potassium  4.6


 


Chloride  108 H


 


Carbon Dioxide  20 L D


 


Anion Gap  10


 


BUN  9


 


Creatinine  0.29 L


 


Est GFR ( Amer)  > 60


 


Est GFR (Non-Af Amer)  > 60


 


Glucose  256 H


 


Calcium  6.2 L*


 


Magnesium  1.3 L


 


Total Bilirubin  0.5


 


AST  25


 


ALT  60 H


 


Alkaline Phosphatase  252 H


 


Total Protein  2.2 L


 


Albumin  < 1.0 L


 


Blood Type 


 


Antibody Screen 








                                        





02/06/19 18:45   Blood   Blood Culture - Final


                            NO GROWTH IN 5 DAYS








Impressions: 


                                        





Abdomen/Pelvis CT  02/06/19 00:00


IMPRESSION:  Diverticulitis of the descending colon and sigmoid colon.  No 

definitive abscess or perforation.


Pigtail catheter in the lower abdomen.


 








Abdomen Ultrasound  02/07/19 00:00


IMPRESSION:


 


No acute sonographic abnormality.


 








Catheter Patency X-Ray  02/08/19 00:00


IMPRESSION:  SMALL RESIDUAL ABSCESS CAVITY.  FISTULOUS COMMUNICATION WITH THE 

DISTAL DESCENDING COLON WITH COLONIC FINDINGS AS DESCRIBED ABOVE.


 








Fluoroscopy  02/08/19 00:00


IMPRESSION:  SMALL RESIDUAL ABSCESS CAVITY.  FISTULOUS COMMUNICATION WITH THE 

DISTAL DESCENDING COLON WITH COLONIC FINDINGS AS DESCRIBED ABOVE.


 








Chest X-Ray  02/11/19 00:00


IMPRESSION:  1.  Interval placement of endotracheal and nasogastric tubes, since

the prior examination dated 2/6/2019.


2.  New finding of left base infiltrate/atelectasis.  Linear subsegmental 

atelectasis at the right lung base.


3.  Additional findings, see as above.


 














Assessment & Plan





- Diagnosis


(1) Sepsis associated hypotension


Is this a current diagnosis for this admission?: Yes   


Plan: 


The patient presented to the ICU with marked hypotension and obtundation.  She 

was already intubated.  She exhibited marked tachycardia with systolic blood 

pressure below 50.  White blood cell count was up to 20,000 from 12,000 earlier 

today.  The patient had already received several liters of fluid and 2 units of 

packed red blood cells in the operating room.  She was markedly acidotic with a 

pH of 7.17, bicarb of 17 and PCO2 of 48.  Serum lactic acid was normal.


Blood cultures drawn on the sixth revealed a single bottle positive for 

Clostridium species.  Abscess drainage revealed multiple organisms consistent 

with bowel monty.  The patient was placed on Unasyn and metronidazole.  New 

blood cultures were obtained.


Very aggressive fluid resuscitation ensued.  50 g of albumin was administered.  

With the lack of response to a single vasopressor additional medications were 

added.  She required maximum doses of dopamine, vasopressin, Levophed and Tr-

Synephrine.  We continued volume resuscitate with approximately 9 L of fluid 

given over several hours.  Stress doses of Solu-Cortef were administered as 

well.  I did have a chance to speak with the patient's sister, mother and friend

to make them aware of the fact of her critical state.








(2) Sepsis


Qualifiers: 


   Qualified Code(s): A41.9 - Sepsis, unspecified organism   


Is this a current diagnosis for this admission?: Yes   


Plan: 


See above.








(3) Diverticulitis large intestine


Qualifiers: 


   Diverticulitis complication: with abscess 


Is this a current diagnosis for this admission?: Yes   


Plan: 


With the initial abscess drained and evidence of diverticulitis noted on the CT 

scan the patient underwent a subtotal colectomy with colostomy formation.  We 

will continue antibiotic therapy.  Surgical drains are in place and surgery will

be managing the incision and drains.








(4) Hypoalbuminemia


Is this a current diagnosis for this admission?: Yes   


Plan: 


The family reports that the patient had been feeling poorly over the last 

several months.  It was felt to be her underlying ulcerative colitis.  Her serum

albumin did drop below 3.0 during her January admission.  Because of the 

extremely aggressive fluid resuscitation the patient's serum albumin was noted t

o be less than 0.1.  To improve oncotic pressure the patient was given 50 g of 

albumin with orders for an additional 50 g today.  We will likely need to 

continue to administer albumin to maintain oncotic pressure to help with the 

eventual diuresis that will be required secondary to the fluids given today.  

This should also help with organ perfusion and maintain intravascular volume.








(5) Colitis


Is this a current diagnosis for this admission?: Yes   


Plan: 


As noted above the patient had been feeling poorly for several months.  It was 

felt to be due to her ulcerative colitis.  At this point there is no specific 

treatment dedicated to the ulcerative colitis while treating her overwhelming 

comorbidities.








- Time


Time Spent: Greater than 70 Minutes


Medications reviewed and adjusted accordingly: Yes





- Inpatient Certification


Based on my medical assessment, after consideration of the patient's 

comorbidities, presenting symptoms, or acuity I expect that the services needed 

warrant INPATIENT care.: Yes


I certify that my determination is in accordance with my understanding of 

Medicare's requirements for reasonable and necessary INPATIENT services [42 CFR 

412.3e].: Yes

## 2019-02-13 LAB
ABSOLUTE LYMPHOCYTES# (MANUAL): 0 10^3/UL (ref 0.5–4.7)
ABSOLUTE MONOCYTES # (MANUAL): 0.5 10^3/UL (ref 0.1–1.4)
ABSOLUTE NEUTROPHILS# (MANUAL): 23.5 10^3/UL (ref 1.7–8.2)
ADD MANUAL DIFF: YES
ALBUMIN SERPL-MCNC: 2 G/DL (ref 3.5–5)
ALP SERPL-CCNC: 157 U/L (ref 38–126)
ALT SERPL-CCNC: 2819 U/L (ref 9–52)
ALT SERPL-CCNC: 3022 U/L (ref 9–52)
ANION GAP SERPL CALC-SCNC: 7 MMOL/L (ref 5–19)
ANION GAP SERPL CALC-SCNC: 8 MMOL/L (ref 5–19)
ANISOCYTOSIS BLD QL SMEAR: (no result)
ARTERIAL BLOOD FIO2: (no result)
ARTERIAL BLOOD FIO2: (no result)
ARTERIAL BLOOD H2CO3: 1.03 MMOL/L (ref 1.05–1.35)
ARTERIAL BLOOD H2CO3: 1.07 MMOL/L (ref 1.05–1.35)
ARTERIAL BLOOD HCO3: 23.1 MMOL/L (ref 20–24)
ARTERIAL BLOOD HCO3: 24.3 MMOL/L (ref 20–24)
ARTERIAL BLOOD PCO2: 34.3 MMHG (ref 35–45)
ARTERIAL BLOOD PCO2: 35.4 MMHG (ref 35–45)
ARTERIAL BLOOD PH: 7.45 (ref 7.35–7.45)
ARTERIAL BLOOD PH: 7.46 (ref 7.35–7.45)
ARTERIAL BLOOD PO2: 116.9 MMHG (ref 80–100)
ARTERIAL BLOOD PO2: 98.8 MMHG (ref 80–100)
ARTERIAL BLOOD TOTAL CO2: 24.2 MMOL/L (ref 21–25)
ARTERIAL BLOOD TOTAL CO2: 25.4 MMOL/L (ref 21–25)
AST SERPL-CCNC: 3057 U/L (ref 14–36)
AST SERPL-CCNC: 3908 U/L (ref 14–36)
BASE EXCESS BLDA CALC-SCNC: -0.6 MMOL/L
BASE EXCESS BLDA CALC-SCNC: 0.5 MMOL/L
BASOPHILS NFR BLD MANUAL: 0 % (ref 0–2)
BILIRUB DIRECT SERPL-MCNC: 2.8 MG/DL (ref 0–0.4)
BILIRUB SERPL-MCNC: 4.9 MG/DL (ref 0.2–1.3)
BUN SERPL-MCNC: 16 MG/DL (ref 7–20)
BUN SERPL-MCNC: 18 MG/DL (ref 7–20)
CALCIUM: 6.4 MG/DL (ref 8.4–10.2)
CALCIUM: 7 MG/DL (ref 8.4–10.2)
CALCIUM: 7.1 MG/DL (ref 8.4–10.2)
CHLORIDE SERPL-SCNC: 106 MMOL/L (ref 98–107)
CHLORIDE SERPL-SCNC: 106 MMOL/L (ref 98–107)
CK MB SERPL-MCNC: 3.43 NG/ML (ref ?–4.55)
CO2 SERPL-SCNC: 25 MMOL/L (ref 22–30)
CO2 SERPL-SCNC: 25 MMOL/L (ref 22–30)
EOSINOPHIL NFR BLD MANUAL: 0 % (ref 0–6)
ERYTHROCYTE [DISTWIDTH] IN BLOOD BY AUTOMATED COUNT: 15.6 % (ref 11.5–14)
ERYTHROCYTE [DISTWIDTH] IN BLOOD BY AUTOMATED COUNT: 15.7 % (ref 11.5–14)
GLUCOSE SERPL-MCNC: 102 MG/DL (ref 75–110)
GLUCOSE SERPL-MCNC: 126 MG/DL (ref 75–110)
HCT VFR BLD CALC: 22.6 % (ref 36–47)
HCT VFR BLD CALC: 26.2 % (ref 36–47)
HGB BLD-MCNC: 5.6 G/DL (ref 12–15.5)
HGB BLD-MCNC: 7.8 G/DL (ref 12–15.5)
HGB BLD-MCNC: 9 G/DL (ref 12–15.5)
INR PPP: 2.43
MCH RBC QN AUTO: 31 PG (ref 27–33.4)
MCH RBC QN AUTO: 31.2 PG (ref 27–33.4)
MCHC RBC AUTO-ENTMCNC: 34.3 G/DL (ref 32–36)
MCHC RBC AUTO-ENTMCNC: 34.4 G/DL (ref 32–36)
MCV RBC AUTO: 90 FL (ref 80–97)
MCV RBC AUTO: 91 FL (ref 80–97)
MONOCYTES % (MANUAL): 2 % (ref 3–13)
NEUTS BAND NFR BLD MANUAL: 5 % (ref 3–5)
PLATELET # BLD: 58 10^3/UL (ref 150–450)
PLATELET # BLD: 69 10^3/UL (ref 150–450)
PLATELET # BLD: 79 10^3/UL (ref 150–450)
PLATELET COMMENT: (no result)
POTASSIUM SERPL-SCNC: 3.6 MMOL/L (ref 3.6–5)
POTASSIUM SERPL-SCNC: 3.7 MMOL/L (ref 3.6–5)
PREALB SERPL-MCNC: 5.1 MG/DL (ref 17.6–36)
PROT SERPL-MCNC: 2.9 G/DL (ref 6.3–8.2)
PROTHROMBIN TIME: 27.6 SEC (ref 11.4–15.4)
RBC # BLD AUTO: 2.48 10^6/UL (ref 3.72–5.28)
RBC # BLD AUTO: 2.9 10^6/UL (ref 3.72–5.28)
SAO2 % BLDA: 97.8 % (ref 94–98)
SAO2 % BLDA: 98.4 % (ref 94–98)
SEGMENTED NEUTROPHILS % (MAN): 93 % (ref 42–78)
SODIUM SERPL-SCNC: 138.2 MMOL/L (ref 137–145)
SODIUM SERPL-SCNC: 138.6 MMOL/L (ref 137–145)
TOTAL CELLS COUNTED BLD: 100
TRIGL SERPL-MCNC: 41 MG/DL (ref ?–150)
TROPONIN I SERPL-MCNC: 0.03 NG/ML
VARIANT LYMPHS NFR BLD MANUAL: 0 % (ref 13–45)
WBC # BLD AUTO: 22.4 10^3/UL (ref 4–10.5)
WBC # BLD AUTO: 24 10^3/UL (ref 4–10.5)
WBC # BLD AUTO: 26.7 10^3/UL (ref 4–10.5)

## 2019-02-13 RX ADMIN — METOCLOPRAMIDE SCH MLS/HR: 5 INJECTION, SOLUTION INTRAMUSCULAR; INTRAVENOUS at 17:21

## 2019-02-13 RX ADMIN — MIDAZOLAM HYDROCHLORIDE PRN MLS/HR: 5 INJECTION, SOLUTION INTRAMUSCULAR; INTRAVENOUS at 19:08

## 2019-02-13 RX ADMIN — FLUOXETINE SCH: 20 CAPSULE ORAL at 10:35

## 2019-02-13 RX ADMIN — ALBUMIN (HUMAN) SCH MLS/HR: 12.5 SOLUTION INTRAVENOUS at 16:21

## 2019-02-13 RX ADMIN — IPRATROPIUM BROMIDE AND ALBUTEROL SULFATE SCH ML: 2.5; .5 SOLUTION RESPIRATORY (INHALATION) at 13:39

## 2019-02-13 RX ADMIN — Medication SCH: at 13:27

## 2019-02-13 RX ADMIN — GABAPENTIN SCH: 300 CAPSULE ORAL at 22:23

## 2019-02-13 RX ADMIN — FUROSEMIDE PRN MG: 10 INJECTION, SOLUTION INTRAMUSCULAR; INTRAVENOUS at 05:00

## 2019-02-13 RX ADMIN — AMPICILLIN SODIUM AND SULBACTAM SODIUM SCH MLS/HR: 2; 1 INJECTION, POWDER, FOR SOLUTION INTRAMUSCULAR; INTRAVENOUS at 06:13

## 2019-02-13 RX ADMIN — IPRATROPIUM BROMIDE AND ALBUTEROL SULFATE SCH ML: 2.5; .5 SOLUTION RESPIRATORY (INHALATION) at 08:52

## 2019-02-13 RX ADMIN — METRONIDAZOLE SCH MLS/HR: 500 INJECTION, SOLUTION INTRAVENOUS at 17:21

## 2019-02-13 RX ADMIN — IPRATROPIUM BROMIDE AND ALBUTEROL SULFATE SCH ML: 2.5; .5 SOLUTION RESPIRATORY (INHALATION) at 02:56

## 2019-02-13 RX ADMIN — FUROSEMIDE PRN MG: 10 INJECTION, SOLUTION INTRAMUSCULAR; INTRAVENOUS at 04:10

## 2019-02-13 RX ADMIN — METRONIDAZOLE SCH MLS/HR: 500 INJECTION, SOLUTION INTRAVENOUS at 11:20

## 2019-02-13 RX ADMIN — Medication SCH: at 05:10

## 2019-02-13 RX ADMIN — HYDROCORTISONE SODIUM SUCCINATE SCH MG: 100 INJECTION, POWDER, FOR SOLUTION INTRAMUSCULAR; INTRAVENOUS at 17:22

## 2019-02-13 RX ADMIN — ENOXAPARIN SODIUM SCH: 40 INJECTION SUBCUTANEOUS at 10:35

## 2019-02-13 RX ADMIN — METRONIDAZOLE SCH MLS/HR: 500 INJECTION, SOLUTION INTRAVENOUS at 23:44

## 2019-02-13 RX ADMIN — ALBUMIN (HUMAN) SCH MLS/HR: 12.5 SOLUTION INTRAVENOUS at 16:00

## 2019-02-13 RX ADMIN — AMPICILLIN SODIUM AND SULBACTAM SODIUM SCH MLS/HR: 2; 1 INJECTION, POWDER, FOR SOLUTION INTRAMUSCULAR; INTRAVENOUS at 22:19

## 2019-02-13 RX ADMIN — HYDROCORTISONE SODIUM SUCCINATE SCH MG: 100 INJECTION, POWDER, FOR SOLUTION INTRAMUSCULAR; INTRAVENOUS at 01:03

## 2019-02-13 RX ADMIN — LEUCINE, PHENYLALANINE, LYSINE, METHIONINE, ISOLEUCINE, VALINE, HISTIDINE, THREONINE, TRYPTOPHAN, ALANINE, GLYCINE, ARGININE, PROLINE, SERINE, TYROSINE, DEXTROSE PRN MLS/HR: 365; 280; 290; 200; 300; 290; 240; 210; 90; 1035; 515; 575; 340; 250; 20; 25 INJECTION INTRAVENOUS at 17:58

## 2019-02-13 RX ADMIN — METOCLOPRAMIDE SCH MLS/HR: 5 INJECTION, SOLUTION INTRAMUSCULAR; INTRAVENOUS at 11:20

## 2019-02-13 RX ADMIN — GABAPENTIN SCH: 300 CAPSULE ORAL at 10:35

## 2019-02-13 RX ADMIN — HYDROCORTISONE SODIUM SUCCINATE SCH MG: 100 INJECTION, POWDER, FOR SOLUTION INTRAMUSCULAR; INTRAVENOUS at 11:19

## 2019-02-13 RX ADMIN — Medication SCH: at 22:22

## 2019-02-13 RX ADMIN — DIPHENHYDRAMINE HYDROCHLORIDE SCH: 25 CAPSULE ORAL at 22:22

## 2019-02-13 RX ADMIN — METRONIDAZOLE SCH MLS/HR: 500 INJECTION, SOLUTION INTRAVENOUS at 06:35

## 2019-02-13 RX ADMIN — MIDAZOLAM HYDROCHLORIDE PRN MLS/HR: 5 INJECTION, SOLUTION INTRAMUSCULAR; INTRAVENOUS at 07:20

## 2019-02-13 RX ADMIN — IPRATROPIUM BROMIDE AND ALBUTEROL SULFATE SCH ML: 2.5; .5 SOLUTION RESPIRATORY (INHALATION) at 20:52

## 2019-02-13 RX ADMIN — NOREPINEPHRINE BITARTRATE PRN MLS/HR: 1 INJECTION, SOLUTION, CONCENTRATE INTRAVENOUS at 04:05

## 2019-02-13 RX ADMIN — AMPICILLIN SODIUM AND SULBACTAM SODIUM SCH MLS/HR: 2; 1 INJECTION, POWDER, FOR SOLUTION INTRAMUSCULAR; INTRAVENOUS at 13:27

## 2019-02-13 NOTE — PDOC PROGRESS REPORT
Subjective


Progress Note for:: 02/13/19


Subjective:: 





No adverse events overnight.  Patient remains sedated and intubated.  She went 

into A. fib for a short time and had to go back on a Cardizem drip.  We were 

able to discontinue the drip this morning because she converted to a sinus 

rhythm.  She has held a sinus rhythm throughout the day today and her blood 

pressure has improved since coming off of the Cardizem drip.  Urine output 

remains poor.


Reason For Visit: 


DIVERTICULITIS,SIGMOID AND DESCENDING COLON,INTRA-








Physical Exam


Vital Signs: 


                                        











Temp Pulse Resp BP Pulse Ox


 


 97.5 F   89   10 L  117/67   98 


 


 02/13/19 16:00  02/13/19 16:00  02/13/19 16:00  02/13/19 16:00  02/13/19 16:36








                                 Intake & Output











 02/12/19 02/13/19 02/14/19





 06:59 06:59 06:59


 


Intake Total 21031 6651 798


 


Output Total 9992 1301 790


 


Balance 7359 5350 8


 


Weight 83.5 kg 92.3 kg 








General appearance: PRESENT: severe distress, other - Sedated, intubated


Respiratory exam: PRESENT: crackles - Bilateral, symmetrical, unlabored.  

ABSENT: prolonged expiratory phas, rhonchi, tachypnea, wheezes


Cardiovascular exam: PRESENT: +S1, +S2, tachycardia


Vascular exam: ABSENT: normal capillary refill - Capillary refill was 6 seconds


GI/Abdominal exam: PRESENT: diminished bowel sounds, distended, soft, other - 

Midline abdominal decision was covered with a bulky dressing, she had 3 DAY 

drains.  ABSENT: rebound, tenderness


Extremities exam: PRESENT: pedal edema, other - She has 3+ or greater 

generalized pitting edema in her upper and lower extremities as well as diffuse 

anasarca.  ABSENT: clubbing


Musculoskeletal exam: ABSENT: deformity, tenderness


Neurological exam: PRESENT: other - Sedated, intubated


Skin exam: PRESENT: jaundice, mottled.  ABSENT: warm





Results


Laboratory Results: 


                                        





                                 02/13/19 16:45 





                                 02/13/19 04:40 





                                        











  02/10/19 02/12/19 02/12/19





  15:10 19:58 19:58


 


WBC   


 


RBC   


 


Hgb   


 


Hct   


 


MCV   


 


MCH   


 


MCHC   


 


RDW   


 


Plt Count   


 


Seg Neutrophils %   


 


Lymphocytes %   


 


Monocytes %   


 


Eosinophils %   


 


Basophils %   


 


Absolute Neutrophils   


 


Absolute Lymphocytes   


 


Absolute Monocytes   


 


Absolute Eosinophils   


 


Absolute Basophils   


 


Carbonic Acid   


 


HCO3/H2CO3 Ratio   


 


ABG pH   


 


ABG pCO2   


 


ABG pO2   


 


ABG HCO3   


 


ABG O2 Saturation   


 


ABG Base Excess   


 


FiO2   


 


Sodium   137.0 


 


Potassium   3.7 


 


Chloride   103 


 


Carbon Dioxide   22 


 


Anion Gap   12 


 


BUN   14 


 


Creatinine   0.63 


 


Est GFR ( Amer)   > 60 


 


Est GFR (Non-Af Amer)   > 60 


 


Glucose   98 


 


Lactic Acid    3.9 H


 


Calcium   7.1 L 


 


Magnesium   


 


Total Bilirubin   


 


AST   


 


ALT   


 


Alkaline Phosphatase   


 


Total Protein   


 


Albumin   


 


Prealbumin   


 


Triglycerides   


 


Blood Type  O POSITIVE  


 


Antibody Screen  NEGATIVE  














  02/12/19 02/12/19 02/12/19





  20:25 23:12 23:12


 


WBC    Cancelled


 


RBC    Cancelled


 


Hgb    Cancelled


 


Hct    Cancelled


 


MCV    Cancelled


 


MCH    Cancelled


 


MCHC    Cancelled


 


RDW    Cancelled


 


Plt Count    Cancelled


 


Seg Neutrophils %   


 


Lymphocytes %   


 


Monocytes %   


 


Eosinophils %   


 


Basophils %   


 


Absolute Neutrophils   


 


Absolute Lymphocytes   


 


Absolute Monocytes   


 


Absolute Eosinophils   


 


Absolute Basophils   


 


Carbonic Acid  0.68 L  0.97 L 


 


HCO3/H2CO3 Ratio  34:1  23:1 


 


ABG pH  7.64 H*  7.47 H 


 


ABG pCO2  22.6 L  32.3 L 


 


ABG pO2  108.8 H  113.4 H 


 


ABG HCO3  23.5  22.8 


 


ABG O2 Saturation  98.8 H  98.4 H 


 


ABG Base Excess  2.4  -0.9 


 


FiO2  30%  30% 


 


Sodium   


 


Potassium   


 


Chloride   


 


Carbon Dioxide   


 


Anion Gap   


 


BUN   


 


Creatinine   


 


Est GFR ( Amer)   


 


Est GFR (Non-Af Amer)   


 


Glucose   


 


Lactic Acid   


 


Calcium   


 


Magnesium   


 


Total Bilirubin   


 


AST   


 


ALT   


 


Alkaline Phosphatase   


 


Total Protein   


 


Albumin   


 


Prealbumin   


 


Triglycerides   


 


Blood Type   


 


Antibody Screen   














  02/12/19 02/12/19 02/12/19





  23:12 23:12 23:46


 


WBC    22.4 H


 


RBC    1.77 L


 


Hgb    5.6 L D


 


Hct    16.3 L


 


MCV    92


 


MCH    31.4


 


MCHC    34.3


 


RDW    16.6 H


 


Plt Count    79 L


 


Seg Neutrophils %   


 


Lymphocytes %   


 


Monocytes %   


 


Eosinophils %   


 


Basophils %   


 


Absolute Neutrophils   


 


Absolute Lymphocytes   


 


Absolute Monocytes   


 


Absolute Eosinophils   


 


Absolute Basophils   


 


Carbonic Acid   


 


HCO3/H2CO3 Ratio   


 


ABG pH   


 


ABG pCO2   


 


ABG pO2   


 


ABG HCO3   


 


ABG O2 Saturation   


 


ABG Base Excess   


 


FiO2   


 


Sodium  136.0 L  


 


Potassium  3.6  


 


Chloride  105  


 


Carbon Dioxide  25  


 


Anion Gap  6  


 


BUN  15  


 


Creatinine  0.73  


 


Est GFR ( Amer)  > 60  


 


Est GFR (Non-Af Amer)  > 60  


 


Glucose  101  


 


Lactic Acid   2.4 H 


 


Calcium  6.4 L*  


 


Magnesium   


 


Total Bilirubin  4.4 H  


 


AST  3908 H  


 


ALT  3022 H  


 


Alkaline Phosphatase  136 H  


 


Total Protein  3.0 L  


 


Albumin  1.8 L  


 


Prealbumin   


 


Triglycerides   


 


Blood Type   


 


Antibody Screen   














  02/13/19 02/13/19 02/13/19





  04:40 04:40 04:40


 


WBC  24.0 H  


 


RBC  2.48 L  


 


Hgb  7.8 L D  


 


Hct  22.6 L  


 


MCV  91  


 


MCH  31.2  


 


MCHC  34.3  


 


RDW  15.6 H  


 


Plt Count  69 L  


 


Seg Neutrophils %  Not Reportable  


 


Lymphocytes %  Not Reportable  


 


Monocytes %  Not Reportable  


 


Eosinophils %  Not Reportable  


 


Basophils %  Not Reportable  


 


Absolute Neutrophils  Not Reportable  


 


Absolute Lymphocytes  Not Reportable  


 


Absolute Monocytes  Not Reportable  


 


Absolute Eosinophils  Not Reportable  


 


Absolute Basophils  Not Reportable  


 


Carbonic Acid   


 


HCO3/H2CO3 Ratio   


 


ABG pH   


 


ABG pCO2   


 


ABG pO2   


 


ABG HCO3   


 


ABG O2 Saturation   


 


ABG Base Excess   


 


FiO2   


 


Sodium   138.2 


 


Potassium   3.6 


 


Chloride   106 


 


Carbon Dioxide   25 


 


Anion Gap   7 


 


BUN   16 


 


Creatinine   0.77 


 


Est GFR ( Amer)   > 60 


 


Est GFR (Non-Af Amer)   > 60 


 


Glucose   102 


 


Lactic Acid    1.7


 


Calcium   7.0 L* 


 


Magnesium   1.8 


 


Total Bilirubin   4.9 H 


 


AST   3057 H 


 


ALT   2819 H 


 


Alkaline Phosphatase   157 H 


 


Total Protein   2.9 L 


 


Albumin   2.0 L 


 


Prealbumin   


 


Triglycerides   


 


Blood Type   


 


Antibody Screen   














  02/13/19 02/13/19 02/13/19





  04:40 06:04 14:10


 


WBC   


 


RBC   


 


Hgb   


 


Hct   


 


MCV   


 


MCH   


 


MCHC   


 


RDW   


 


Plt Count   


 


Seg Neutrophils %   


 


Lymphocytes %   


 


Monocytes %   


 


Eosinophils %   


 


Basophils %   


 


Absolute Neutrophils   


 


Absolute Lymphocytes   


 


Absolute Monocytes   


 


Absolute Eosinophils   


 


Absolute Basophils   


 


Carbonic Acid  1.07   1.03 L


 


HCO3/H2CO3 Ratio  22:1   22:1


 


ABG pH  7.46 H   7.45


 


ABG pCO2  35.4   34.3 L


 


ABG pO2  98.8   116.9 H


 


ABG HCO3  24.3 H   23.1


 


ABG O2 Saturation  97.8   98.4 H


 


ABG Base Excess  0.5   -0.6


 


FiO2  30%   30%


 


Sodium   


 


Potassium   


 


Chloride   


 


Carbon Dioxide   


 


Anion Gap   


 


BUN   


 


Creatinine   


 


Est GFR ( Amer)   


 


Est GFR (Non-Af Amer)   


 


Glucose   


 


Lactic Acid   


 


Calcium   


 


Magnesium   


 


Total Bilirubin   


 


AST   


 


ALT   


 


Alkaline Phosphatase   


 


Total Protein   


 


Albumin   


 


Prealbumin   5.1 L 


 


Triglycerides   41 


 


Blood Type   


 


Antibody Screen   














  02/13/19





  16:45


 


WBC  26.7 H


 


RBC  2.90 L


 


Hgb  9.0 L


 


Hct  26.2 L


 


MCV  90


 


MCH  31.0


 


MCHC  34.4


 


RDW  15.7 H


 


Plt Count  58 L


 


Seg Neutrophils % 


 


Lymphocytes % 


 


Monocytes % 


 


Eosinophils % 


 


Basophils % 


 


Absolute Neutrophils 


 


Absolute Lymphocytes 


 


Absolute Monocytes 


 


Absolute Eosinophils 


 


Absolute Basophils 


 


Carbonic Acid 


 


HCO3/H2CO3 Ratio 


 


ABG pH 


 


ABG pCO2 


 


ABG pO2 


 


ABG HCO3 


 


ABG O2 Saturation 


 


ABG Base Excess 


 


FiO2 


 


Sodium 


 


Potassium 


 


Chloride 


 


Carbon Dioxide 


 


Anion Gap 


 


BUN 


 


Creatinine 


 


Est GFR ( Amer) 


 


Est GFR (Non-Af Amer) 


 


Glucose 


 


Lactic Acid 


 


Calcium 


 


Magnesium 


 


Total Bilirubin 


 


AST 


 


ALT 


 


Alkaline Phosphatase 


 


Total Protein 


 


Albumin 


 


Prealbumin 


 


Triglycerides 


 


Blood Type 


 


Antibody Screen 








                                        











  02/13/19 02/13/19





  06:04 06:04


 


Creatine Kinase  136 H 


 


CK-MB (CK-2)   3.43


 


Troponin I   0.033











Impressions: 


                                        





Abdomen/Pelvis CT  02/06/19 00:00


IMPRESSION:  Diverticulitis of the descending colon and sigmoid colon.  No 

definitive abscess or perforation.


Pigtail catheter in the lower abdomen.


 








Abdomen Ultrasound  02/07/19 00:00


IMPRESSION:


 


No acute sonographic abnormality.


 








Catheter Patency X-Ray  02/08/19 00:00


IMPRESSION:  SMALL RESIDUAL ABSCESS CAVITY.  FISTULOUS COMMUNICATION WITH THE 

DISTAL DESCENDING COLON WITH COLONIC FINDINGS AS DESCRIBED ABOVE.


 








Fluoroscopy  02/08/19 00:00


IMPRESSION:  SMALL RESIDUAL ABSCESS CAVITY.  FISTULOUS COMMUNICATION WITH THE 

DISTAL DESCENDING COLON WITH COLONIC FINDINGS AS DESCRIBED ABOVE.


 








Chest X-Ray  02/13/19 06:00


IMPRESSION:


 


No significant change.


 














Assessment & Plan





- Diagnosis


(1) Septic shock


Is this a current diagnosis for this admission?: Yes   


Plan: 


She has evidence of multisystem organ failure, but there are some signs of 

improvement.  Her creatinine has improved and she has had improvement in her b

lood pressure.  She is on broad-spectrum antibiotic coverage for likely 

organisms.  Liver enzymes have improved.  She is off the Cardizem drip and her 

heart rate is holding stable and her blood pressure improved.  She got a couple 

more units of blood today.  We will try to give her some Lasix.  Fluid from the 

DAY drains has been sent off for creatinine to determine if she is got urine in 

the abdomen.  Her creatinine is normal her vital signs are good, and we could 

consider a CT scan of the abdomen and pelvis with contrast, but since she is now

putting out any urine we do not know if she will be able to eliminate it from 

her bloodstream despite her normal creatinine.  Not sure if an abdominal 

ultrasound will be able to benefit us in this situation.  We will discuss with 

surgery.  If she is not putting out urine by tomorrow, she would likely benefit 

from a nephrology consultation to see if she would be a candidate for dialysis 

due to her refractory fluid overload.  We will continue aggressive supportive 

care.








(2) Colonic fistula


Is this a current diagnosis for this admission?: Yes   


Plan: 


Management per surgery








- Time


Time Spent with patient: 35 or more minutes

## 2019-02-13 NOTE — PDOC PROGRESS REPORT
Subjective


Progress Note for:: 02/13/19


Reason For Visit: 


DIVERTICULITIS,SIGMOID AND DESCENDING COLON,INTRA-


sepsis





Physical Exam


Vital Signs: 


                                        











Temp Pulse Resp BP Pulse Ox


 


 97.0 F   89   9 L  102/61   99 


 


 02/13/19 06:00  02/13/19 06:00  02/13/19 06:00  02/13/19 06:00  02/13/19 06:00








                                 Intake & Output











 02/12/19 02/13/19 02/14/19





 06:59 06:59 06:59


 


Intake Total 43362 6651 


 


Output Total 9992 1301 


 


Balance 7359 5350 


 


Weight 83.5 kg 92.3 kg 











General appearance: PRESENT: obese, other - barely responsive on versed drip


Head exam: PRESENT: atraumatic


Eye exam: PRESENT: conjunctiva pink


Respiratory exam: PRESENT: rhonchi - coarse rhonchi bilat


Cardiovascular exam: PRESENT: other - rrr currently. recent a-fib last pm, on 

cardiozem drip


GI/Abdominal exam: PRESENT: firm, hypoactive bowel sounds - wound dry


Musculoskeletal exam: PRESENT: other - diffuse swelling, 3+ edema





Results


Laboratory Results: 


                                        





                                 02/13/19 04:40 





                                 02/13/19 04:40 





                                        











  02/10/19 02/12/19 02/12/19





  15:10 09:48 10:40


 


WBC   


 


RBC   


 


Hgb   


 


Hct   


 


MCV   


 


MCH   


 


MCHC   


 


RDW   


 


Plt Count   


 


Seg Neutrophils %   


 


Lymphocytes %   


 


Monocytes %   


 


Eosinophils %   


 


Basophils %   


 


Absolute Neutrophils   


 


Absolute Lymphocytes   


 


Absolute Monocytes   


 


Absolute Eosinophils   


 


Absolute Basophils   


 


Carbonic Acid    0.68 L


 


HCO3/H2CO3 Ratio    31:1


 


ABG pH    7.59 H


 


ABG pCO2    22.7 L


 


ABG pO2    84.6


 


ABG HCO3    21.2


 


ABG O2 Saturation    97.8


 


ABG Base Excess    0.2


 


FiO2    30%


 


Sodium   


 


Potassium   


 


Chloride   


 


Carbon Dioxide   


 


Anion Gap   


 


BUN   


 


Creatinine   


 


Est GFR ( Amer)   


 


Est GFR (Non-Af Amer)   


 


Glucose   


 


Lactic Acid   6.0 H 


 


Calcium   


 


Magnesium   


 


Total Bilirubin   


 


AST   


 


ALT   


 


Alkaline Phosphatase   


 


Total Protein   


 


Albumin   


 


Blood Type  O POSITIVE  


 


Antibody Screen  NEGATIVE  














  02/12/19 02/12/19 02/12/19





  14:40 19:58 19:58


 


WBC   


 


RBC   


 


Hgb   


 


Hct   


 


MCV   


 


MCH   


 


MCHC   


 


RDW   


 


Plt Count   


 


Seg Neutrophils %   


 


Lymphocytes %   


 


Monocytes %   


 


Eosinophils %   


 


Basophils %   


 


Absolute Neutrophils   


 


Absolute Lymphocytes   


 


Absolute Monocytes   


 


Absolute Eosinophils   


 


Absolute Basophils   


 


Carbonic Acid   


 


HCO3/H2CO3 Ratio   


 


ABG pH   


 


ABG pCO2   


 


ABG pO2   


 


ABG HCO3   


 


ABG O2 Saturation   


 


ABG Base Excess   


 


FiO2   


 


Sodium   137.0 


 


Potassium   3.7 


 


Chloride   103 


 


Carbon Dioxide   22 


 


Anion Gap   12 


 


BUN   14 


 


Creatinine   0.63 


 


Est GFR ( Amer)   > 60 


 


Est GFR (Non-Af Amer)   > 60 


 


Glucose   98 


 


Lactic Acid  5.2 H   3.9 H


 


Calcium   7.1 L 


 


Magnesium   


 


Total Bilirubin   


 


AST   


 


ALT   


 


Alkaline Phosphatase   


 


Total Protein   


 


Albumin   


 


Blood Type   


 


Antibody Screen   














  02/12/19 02/12/19 02/12/19





  20:25 23:12 23:12


 


WBC    Cancelled


 


RBC    Cancelled


 


Hgb    Cancelled


 


Hct    Cancelled


 


MCV    Cancelled


 


MCH    Cancelled


 


MCHC    Cancelled


 


RDW    Cancelled


 


Plt Count    Cancelled


 


Seg Neutrophils %   


 


Lymphocytes %   


 


Monocytes %   


 


Eosinophils %   


 


Basophils %   


 


Absolute Neutrophils   


 


Absolute Lymphocytes   


 


Absolute Monocytes   


 


Absolute Eosinophils   


 


Absolute Basophils   


 


Carbonic Acid  0.68 L  0.97 L 


 


HCO3/H2CO3 Ratio  34:1  23:1 


 


ABG pH  7.64 H*  7.47 H 


 


ABG pCO2  22.6 L  32.3 L 


 


ABG pO2  108.8 H  113.4 H 


 


ABG HCO3  23.5  22.8 


 


ABG O2 Saturation  98.8 H  98.4 H 


 


ABG Base Excess  2.4  -0.9 


 


FiO2  30%  30% 


 


Sodium   


 


Potassium   


 


Chloride   


 


Carbon Dioxide   


 


Anion Gap   


 


BUN   


 


Creatinine   


 


Est GFR ( Amer)   


 


Est GFR (Non-Af Amer)   


 


Glucose   


 


Lactic Acid   


 


Calcium   


 


Magnesium   


 


Total Bilirubin   


 


AST   


 


ALT   


 


Alkaline Phosphatase   


 


Total Protein   


 


Albumin   


 


Blood Type   


 


Antibody Screen   














  02/12/19 02/12/19 02/12/19





  23:12 23:12 23:46


 


WBC    22.4 H


 


RBC    1.77 L


 


Hgb    5.6 L D


 


Hct    16.3 L


 


MCV    92


 


MCH    31.4


 


MCHC    34.3


 


RDW    16.6 H


 


Plt Count    79 L


 


Seg Neutrophils %   


 


Lymphocytes %   


 


Monocytes %   


 


Eosinophils %   


 


Basophils %   


 


Absolute Neutrophils   


 


Absolute Lymphocytes   


 


Absolute Monocytes   


 


Absolute Eosinophils   


 


Absolute Basophils   


 


Carbonic Acid   


 


HCO3/H2CO3 Ratio   


 


ABG pH   


 


ABG pCO2   


 


ABG pO2   


 


ABG HCO3   


 


ABG O2 Saturation   


 


ABG Base Excess   


 


FiO2   


 


Sodium  136.0 L  


 


Potassium  3.6  


 


Chloride  105  


 


Carbon Dioxide  25  


 


Anion Gap  6  


 


BUN  15  


 


Creatinine  0.73  


 


Est GFR ( Amer)  > 60  


 


Est GFR (Non-Af Amer)  > 60  


 


Glucose  101  


 


Lactic Acid   2.4 H 


 


Calcium  6.4 L*  


 


Magnesium   


 


Total Bilirubin  4.4 H  


 


AST  3908 H  


 


ALT  3022 H  


 


Alkaline Phosphatase  136 H  


 


Total Protein  3.0 L  


 


Albumin  1.8 L  


 


Blood Type   


 


Antibody Screen   














  02/13/19 02/13/19 02/13/19





  04:40 04:40 04:40


 


WBC  24.0 H  


 


RBC  2.48 L  


 


Hgb  7.8 L D  


 


Hct  22.6 L  


 


MCV  91  


 


MCH  31.2  


 


MCHC  34.3  


 


RDW  15.6 H  


 


Plt Count  69 L  


 


Seg Neutrophils %  Not Reportable  


 


Lymphocytes %  Not Reportable  


 


Monocytes %  Not Reportable  


 


Eosinophils %  Not Reportable  


 


Basophils %  Not Reportable  


 


Absolute Neutrophils  Not Reportable  


 


Absolute Lymphocytes  Not Reportable  


 


Absolute Monocytes  Not Reportable  


 


Absolute Eosinophils  Not Reportable  


 


Absolute Basophils  Not Reportable  


 


Carbonic Acid   


 


HCO3/H2CO3 Ratio   


 


ABG pH   


 


ABG pCO2   


 


ABG pO2   


 


ABG HCO3   


 


ABG O2 Saturation   


 


ABG Base Excess   


 


FiO2   


 


Sodium   138.2 


 


Potassium   3.6 


 


Chloride   106 


 


Carbon Dioxide   25 


 


Anion Gap   7 


 


BUN   16 


 


Creatinine   0.77 


 


Est GFR ( Amer)   > 60 


 


Est GFR (Non-Af Amer)   > 60 


 


Glucose   102 


 


Lactic Acid    1.7


 


Calcium   7.0 L* 


 


Magnesium   1.8 


 


Total Bilirubin   4.9 H 


 


AST   3057 H 


 


ALT   2819 H 


 


Alkaline Phosphatase   157 H 


 


Total Protein   2.9 L 


 


Albumin   2.0 L 


 


Blood Type   


 


Antibody Screen   














  02/13/19





  04:40


 


WBC 


 


RBC 


 


Hgb 


 


Hct 


 


MCV 


 


MCH 


 


MCHC 


 


RDW 


 


Plt Count 


 


Seg Neutrophils % 


 


Lymphocytes % 


 


Monocytes % 


 


Eosinophils % 


 


Basophils % 


 


Absolute Neutrophils 


 


Absolute Lymphocytes 


 


Absolute Monocytes 


 


Absolute Eosinophils 


 


Absolute Basophils 


 


Carbonic Acid  1.07


 


HCO3/H2CO3 Ratio  22:1


 


ABG pH  7.46 H


 


ABG pCO2  35.4


 


ABG pO2  98.8


 


ABG HCO3  24.3 H


 


ABG O2 Saturation  97.8


 


ABG Base Excess  0.5


 


FiO2  30%


 


Sodium 


 


Potassium 


 


Chloride 


 


Carbon Dioxide 


 


Anion Gap 


 


BUN 


 


Creatinine 


 


Est GFR ( Amer) 


 


Est GFR (Non-Af Amer) 


 


Glucose 


 


Lactic Acid 


 


Calcium 


 


Magnesium 


 


Total Bilirubin 


 


AST 


 


ALT 


 


Alkaline Phosphatase 


 


Total Protein 


 


Albumin 


 


Blood Type 


 


Antibody Screen 








                                        











  02/13/19 02/13/19





  06:04 06:04


 


Creatine Kinase  136 H 


 


CK-MB (CK-2)   3.43


 


Troponin I   0.033











Impressions: 


                                        





Abdomen/Pelvis CT  02/06/19 00:00


IMPRESSION:  Diverticulitis of the descending colon and sigmoid colon.  No 

definitive abscess or perforation.


Pigtail catheter in the lower abdomen.


 








Abdomen Ultrasound  02/07/19 00:00


IMPRESSION:


 


No acute sonographic abnormality.


 








Catheter Patency X-Ray  02/08/19 00:00


IMPRESSION:  SMALL RESIDUAL ABSCESS CAVITY.  FISTULOUS COMMUNICATION WITH THE 

DISTAL DESCENDING COLON WITH COLONIC FINDINGS AS DESCRIBED ABOVE.


 








Fluoroscopy  02/08/19 00:00


IMPRESSION:  SMALL RESIDUAL ABSCESS CAVITY.  FISTULOUS COMMUNICATION WITH THE 

DISTAL DESCENDING COLON WITH COLONIC FINDINGS AS DESCRIBED ABOVE.


 








Chest X-Ray  02/13/19 06:00


IMPRESSION:


 


No significant change.


 














Assessment & Plan





- Plan Summary


Plan Summary: 





pt still with poor urine op despite multiple fluid boluses


off pressors.


high isidro output 


wbc 24


lactate improved


troponin .033


creat and bun wnl


plan tnx 1 uprbc


will sent off isidro drain for creat


cont vent support


cont iv abx


?nephrology consult. ?dialysis


close icu support

## 2019-02-13 NOTE — RADIOLOGY REPORT (SQ)
EXAM DESCRIPTION:

XR CHEST 1 VIEW



COMPLETED DATE/TME:  02/13/2019 06:00



CLINICAL HISTORY:

58 years Female, septic shock



COMPARISON:

2 days prior.



NUMBER OF VIEWS/TECHNIQUE:

1/AP 



FINDINGS:



Bilateral lower thoracic opacity/effusion.  Moderate central

edema pattern.Adequate appearing endotracheal tube.  Likely

adequate appearing enteric tube partially obscured. Right PICC

appears adequate.  Upper abdominal clips.  Normal cardiac

silhouette size.   No pneumothorax. Stable bony thorax.



IMPRESSION:



No significant change.

## 2019-02-13 NOTE — PROGRESS NOTE
Provider Note


Provider Note: 


ID Consult Note





Asked by Pharmacy to review patient's chart. Pt not seen or examined. Ms Wiseman 

is a 58 year old woman who has had a percutaneous drain placed last month for 

intraabdominal/pelvic abscess. She had complaints of lower abdominal pain and 

nausea, was suspected of having clogged drain and possibly development of new 

abscess. Blood cultures on admission had growth of Clostridium species in one 

bottle. She was treated with IV Zosyn and bowel rest while tube was interrogated

and further imaging performed. Fecal material was draining from the abscess 

drainage tube, and a fistulous communication with the descending colon was 

found. She was taken for surgery on 2/11/19 for acute sigmoid diverticulitis 

with perforation, was found to have a complex pelvic abscess and LUQ abscess 

associated with the splenic flexure of the colon; she underwent subtotal 

colectomy with ileostomy, resection of a portion of small bowel, needed 

irrigation for stool contamination into the peritoneum, and placement of 3 Juan

drains. She developed septic shock with multisystem organ dysfunction including 

acute kidney injury and shock liver and was transferred to the ICU subsequently.

Her WBC count increased to 32,000. She developed thrombocytopenia. Zosyn at this

point was discontinued in favor of Unasyn and Flagyl. Repeat blood cultures have

a Gram positive rebecca preliminarily reported.





Impression/Recommendations


Anticipate that the Gram positive rebecca in her blood cultures will be Clostridium 

species. Doubt the bacteremia represents an antibiotic failure given the amount 

of pathology encountered in the OR; suspect it reflects her disease burden and 

the need for source control that was being undertaken at that time. Can continue

metronidazole, since it is the most reliably active agent against anaerobes. The

rates of decreased susceptibility of E coli to Unasyn make it a less attractive 

option for covering enterics.  Suggest treating pt with Rocephin/Flagyl instead 

of Unasyn/Flagyl. Rocephin does not address enterococcus, but dedicated 

enterococcal activity is usually not needed in mixed infections, particularly 

when coupled with aggressive debridement. 





Buck Holliday MD


[facility] Infectious Diseases


pager [phone]

## 2019-02-13 NOTE — EKG REPORT
SEVERITY:- ABNORMAL ECG -

SINUS RHYTHM

BORDERLINE LEFT AXIS DEVIATION

LOW VOLTAGE IN FRONTAL LEADS

ABNORMAL T, CONSIDER ISCHEMIA, ANT-LAT LEADS

PROLONGED QT INTERVAL

:

Confirmed by: Keila Almanza MD 13-Feb-2019 07:35:49

## 2019-02-14 LAB
ABSOLUTE LYMPHOCYTES# (MANUAL): 0.8 10^3/UL (ref 0.5–4.7)
ABSOLUTE MONOCYTES # (MANUAL): 0.8 10^3/UL (ref 0.1–1.4)
ABSOLUTE NEUTROPHILS# (MANUAL): 24 10^3/UL (ref 1.7–8.2)
ADD MANUAL DIFF: YES
ALBUMIN SERPL-MCNC: 2 G/DL (ref 3.5–5)
ALP SERPL-CCNC: 184 U/L (ref 38–126)
ALT SERPL-CCNC: 1685 U/L (ref 9–52)
ANION GAP SERPL CALC-SCNC: 10 MMOL/L (ref 5–19)
ANION GAP SERPL CALC-SCNC: 7 MMOL/L (ref 5–19)
APPEARANCE UR: (no result)
APTT PPP: YELLOW S
ARTERIAL BLOOD FIO2: (no result)
ARTERIAL BLOOD H2CO3: 1.06 MMOL/L (ref 1.05–1.35)
ARTERIAL BLOOD HCO3: 23.3 MMOL/L (ref 20–24)
ARTERIAL BLOOD PCO2: 35.2 MMHG (ref 35–45)
ARTERIAL BLOOD PH: 7.44 (ref 7.35–7.45)
ARTERIAL BLOOD PO2: 113.2 MMHG (ref 80–100)
ARTERIAL BLOOD TOTAL CO2: 24.4 MMOL/L (ref 21–25)
AST SERPL-CCNC: 619 U/L (ref 14–36)
BASE EXCESS BLDA CALC-SCNC: -0.6 MMOL/L
BASOPHILS NFR BLD MANUAL: 0 % (ref 0–2)
BILIRUB DIRECT SERPL-MCNC: 2.8 MG/DL (ref 0–0.4)
BILIRUB SERPL-MCNC: 4.2 MG/DL (ref 0.2–1.3)
BILIRUB UR QL STRIP: NEGATIVE
BUN SERPL-MCNC: 22 MG/DL (ref 7–20)
BUN SERPL-MCNC: 26 MG/DL (ref 7–20)
CALCIUM: 7.3 MG/DL (ref 8.4–10.2)
CALCIUM: 7.3 MG/DL (ref 8.4–10.2)
CHLORIDE SERPL-SCNC: 105 MMOL/L (ref 98–107)
CHLORIDE SERPL-SCNC: 106 MMOL/L (ref 98–107)
CO2 SERPL-SCNC: 24 MMOL/L (ref 22–30)
CO2 SERPL-SCNC: 25 MMOL/L (ref 22–30)
CREAT UR-MCNC: 11.4 MG/DL (ref 15–278)
EOSINOPHIL NFR BLD MANUAL: 0 % (ref 0–6)
ERYTHROCYTE [DISTWIDTH] IN BLOOD BY AUTOMATED COUNT: 15.9 % (ref 11.5–14)
GLUCOSE SERPL-MCNC: 181 MG/DL (ref 75–110)
GLUCOSE SERPL-MCNC: 196 MG/DL (ref 75–110)
GLUCOSE UR STRIP-MCNC: NEGATIVE MG/DL
HCT VFR BLD CALC: 25.1 % (ref 36–47)
HGB BLD-MCNC: 8.7 G/DL (ref 12–15.5)
KETONES UR STRIP-MCNC: NEGATIVE MG/DL
MCH RBC QN AUTO: 31 PG (ref 27–33.4)
MCHC RBC AUTO-ENTMCNC: 34.5 G/DL (ref 32–36)
MCV RBC AUTO: 90 FL (ref 80–97)
MONOCYTES % (MANUAL): 3 % (ref 3–13)
NEUTS BAND NFR BLD MANUAL: 6 % (ref 3–5)
NITRITE UR QL STRIP: NEGATIVE
PH UR STRIP: 5 [PH] (ref 5–9)
PHOSPHATE SERPL-MCNC: 2.5 MG/DL (ref 2.5–4.5)
PLATELET # BLD: 54 10^3/UL (ref 150–450)
PLATELET COMMENT: ADEQUATE
POLYCHROMASIA BLD QL SMEAR: (no result)
POTASSIUM SERPL-SCNC: 3.6 MMOL/L (ref 3.6–5)
POTASSIUM SERPL-SCNC: 3.7 MMOL/L (ref 3.6–5)
PREALB SERPL-MCNC: 4.9 MG/DL (ref 17.6–36)
PROT SERPL-MCNC: 3.4 G/DL (ref 6.3–8.2)
PROT UR STRIP-MCNC: 100 MG/DL
RBC # BLD AUTO: 2.8 10^6/UL (ref 3.72–5.28)
SAO2 % BLDA: 98.3 % (ref 94–98)
SEGMENTED NEUTROPHILS % (MAN): 88 % (ref 42–78)
SODIUM SERPL-SCNC: 138.4 MMOL/L (ref 137–145)
SODIUM SERPL-SCNC: 138.6 MMOL/L (ref 137–145)
SP GR UR STRIP: 1.01
TOTAL CELLS COUNTED BLD: 100
URINE SODIUM: 79 MMOL/L (ref 30–90)
UROBILINOGEN UR-MCNC: NEGATIVE MG/DL (ref ?–2)
VARIANT LYMPHS NFR BLD MANUAL: 3 % (ref 13–45)
WBC # BLD AUTO: 25.5 10^3/UL (ref 4–10.5)

## 2019-02-14 PROCEDURE — 06HN33Z INSERTION OF INFUSION DEVICE INTO LEFT FEMORAL VEIN, PERCUTANEOUS APPROACH: ICD-10-PCS | Performed by: SURGERY

## 2019-02-14 PROCEDURE — 02HV33Z INSERTION OF INFUSION DEVICE INTO SUPERIOR VENA CAVA, PERCUTANEOUS APPROACH: ICD-10-PCS | Performed by: SURGERY

## 2019-02-14 RX ADMIN — HYDROCORTISONE SODIUM SUCCINATE SCH MG: 100 INJECTION, POWDER, FOR SOLUTION INTRAMUSCULAR; INTRAVENOUS at 01:22

## 2019-02-14 RX ADMIN — Medication SCH: at 05:26

## 2019-02-14 RX ADMIN — CEFTRIAXONE SCH MLS/HR: 2 INJECTION, SOLUTION INTRAVENOUS at 09:44

## 2019-02-14 RX ADMIN — METRONIDAZOLE SCH MLS/HR: 500 INJECTION, SOLUTION INTRAVENOUS at 12:52

## 2019-02-14 RX ADMIN — IPRATROPIUM BROMIDE AND ALBUTEROL SULFATE SCH ML: 2.5; .5 SOLUTION RESPIRATORY (INHALATION) at 02:15

## 2019-02-14 RX ADMIN — FLUOXETINE SCH: 20 CAPSULE ORAL at 09:29

## 2019-02-14 RX ADMIN — Medication SCH: at 15:57

## 2019-02-14 RX ADMIN — DIPHENHYDRAMINE HYDROCHLORIDE SCH MG: 25 CAPSULE ORAL at 21:04

## 2019-02-14 RX ADMIN — IPRATROPIUM BROMIDE AND ALBUTEROL SULFATE SCH ML: 2.5; .5 SOLUTION RESPIRATORY (INHALATION) at 20:30

## 2019-02-14 RX ADMIN — METRONIDAZOLE SCH MLS/HR: 500 INJECTION, SOLUTION INTRAVENOUS at 05:25

## 2019-02-14 RX ADMIN — LEUCINE, PHENYLALANINE, LYSINE, METHIONINE, ISOLEUCINE, VALINE, HISTIDINE, THREONINE, TRYPTOPHAN, ALANINE, GLYCINE, ARGININE, PROLINE, SERINE, TYROSINE, DEXTROSE PRN MLS/HR: 365; 280; 290; 200; 300; 290; 240; 210; 90; 1035; 515; 575; 340; 250; 20; 25 INJECTION INTRAVENOUS at 17:33

## 2019-02-14 RX ADMIN — IPRATROPIUM BROMIDE AND ALBUTEROL SULFATE SCH ML: 2.5; .5 SOLUTION RESPIRATORY (INHALATION) at 08:16

## 2019-02-14 RX ADMIN — AMPICILLIN SODIUM AND SULBACTAM SODIUM SCH MLS/HR: 2; 1 INJECTION, POWDER, FOR SOLUTION INTRAMUSCULAR; INTRAVENOUS at 05:26

## 2019-02-14 RX ADMIN — Medication SCH: at 21:06

## 2019-02-14 RX ADMIN — IPRATROPIUM BROMIDE AND ALBUTEROL SULFATE SCH ML: 2.5; .5 SOLUTION RESPIRATORY (INHALATION) at 14:31

## 2019-02-14 RX ADMIN — HYDROCORTISONE SODIUM SUCCINATE SCH MG: 100 INJECTION, POWDER, FOR SOLUTION INTRAMUSCULAR; INTRAVENOUS at 17:23

## 2019-02-14 RX ADMIN — Medication SCH UNIT: at 15:56

## 2019-02-14 RX ADMIN — Medication SCH: at 09:44

## 2019-02-14 RX ADMIN — MIDAZOLAM HYDROCHLORIDE PRN MLS/HR: 5 INJECTION, SOLUTION INTRAMUSCULAR; INTRAVENOUS at 06:06

## 2019-02-14 RX ADMIN — HYDROCORTISONE SODIUM SUCCINATE SCH MG: 100 INJECTION, POWDER, FOR SOLUTION INTRAMUSCULAR; INTRAVENOUS at 09:44

## 2019-02-14 RX ADMIN — ALBUMIN (HUMAN) SCH MLS/HR: 12.5 SOLUTION INTRAVENOUS at 17:23

## 2019-02-14 RX ADMIN — ALBUMIN (HUMAN) SCH MLS/HR: 12.5 SOLUTION INTRAVENOUS at 15:57

## 2019-02-14 RX ADMIN — Medication SCH MG: at 17:23

## 2019-02-14 RX ADMIN — ENOXAPARIN SODIUM SCH: 40 INJECTION SUBCUTANEOUS at 09:29

## 2019-02-14 RX ADMIN — GABAPENTIN SCH: 300 CAPSULE ORAL at 09:29

## 2019-02-14 RX ADMIN — METRONIDAZOLE SCH MLS/HR: 500 INJECTION, SOLUTION INTRAVENOUS at 17:23

## 2019-02-14 RX ADMIN — GABAPENTIN SCH MG: 300 CAPSULE ORAL at 21:05

## 2019-02-14 NOTE — PDOC PROGRESS REPORT
Subjective


Progress Note for:: 02/13/19


Subjective:: 





Intubated sedated


Reason For Visit: 


DIVERTICULITIS,SIGMOID AND DESCENDING COLON,INTRA-








Physical Exam


Vital Signs: 


                                        











Temp Pulse Resp BP Pulse Ox


 


 97.0 F   85   18   100/59 L  100 


 


 02/13/19 08:00  02/13/19 08:52  02/13/19 08:52  02/13/19 08:00  02/13/19 08:52








                                 Intake & Output











 02/12/19 02/13/19 02/14/19





 06:59 06:59 06:59


 


Intake Total 86330 6651 


 


Output Total 9992 1301 


 


Balance 7359 5350 


 


Weight 83.5 kg 92.3 kg 











General appearance: PRESENT: no acute distress, disheveled, morbidly obese.  

ABSENT: cooperative


Head exam: PRESENT: atraumatic, normocephalic


Eye exam: PRESENT: conjunctiva pale.  ABSENT: EOMI, nystagmus


Mouth exam: PRESENT: dry mucosa, neck supple, tongue midline, other - ET


Neck exam: ABSENT: carotid bruit, JVD, lymphadenopathy, thyromegaly, tracheal 

deviation, tracheostomy


Respiratory exam: PRESENT: decreased breath sounds, prolonged expiratory phas, 

rhonchi.  ABSENT: retraction


Cardiovascular exam: PRESENT: irregular rhythm


Pulses: PRESENT: normal radial pulses


GI/Abdominal exam: PRESENT: other - Multiple drains status post surgery


Gentrourinary exam: PRESENT: indwelling catheter


Extremities exam: ABSENT: calf tenderness, clubbing, full ROM, joint swelling, 

pedal edema


Musculoskeletal exam: ABSENT: ambulatory, deformity, dislocation


Neurological exam: ABSENT: awake


Skin exam: PRESENT: dry, warm





Results


Laboratory Results: 


                                        





                                 02/13/19 04:40 





                                 02/13/19 04:40 





                                        











  02/10/19 02/12/19 02/12/19





  15:10 09:48 10:40


 


WBC   


 


RBC   


 


Hgb   


 


Hct   


 


MCV   


 


MCH   


 


MCHC   


 


RDW   


 


Plt Count   


 


Seg Neutrophils %   


 


Lymphocytes %   


 


Monocytes %   


 


Eosinophils %   


 


Basophils %   


 


Absolute Neutrophils   


 


Absolute Lymphocytes   


 


Absolute Monocytes   


 


Absolute Eosinophils   


 


Absolute Basophils   


 


Carbonic Acid    0.68 L


 


HCO3/H2CO3 Ratio    31:1


 


ABG pH    7.59 H


 


ABG pCO2    22.7 L


 


ABG pO2    84.6


 


ABG HCO3    21.2


 


ABG O2 Saturation    97.8


 


ABG Base Excess    0.2


 


FiO2    30%


 


Sodium   


 


Potassium   


 


Chloride   


 


Carbon Dioxide   


 


Anion Gap   


 


BUN   


 


Creatinine   


 


Est GFR ( Amer)   


 


Est GFR (Non-Af Amer)   


 


Glucose   


 


Lactic Acid   6.0 H 


 


Calcium   


 


Magnesium   


 


Total Bilirubin   


 


AST   


 


ALT   


 


Alkaline Phosphatase   


 


Total Protein   


 


Albumin   


 


Blood Type  O POSITIVE  


 


Antibody Screen  NEGATIVE  














  02/12/19 02/12/19 02/12/19





  14:40 19:58 19:58


 


WBC   


 


RBC   


 


Hgb   


 


Hct   


 


MCV   


 


MCH   


 


MCHC   


 


RDW   


 


Plt Count   


 


Seg Neutrophils %   


 


Lymphocytes %   


 


Monocytes %   


 


Eosinophils %   


 


Basophils %   


 


Absolute Neutrophils   


 


Absolute Lymphocytes   


 


Absolute Monocytes   


 


Absolute Eosinophils   


 


Absolute Basophils   


 


Carbonic Acid   


 


HCO3/H2CO3 Ratio   


 


ABG pH   


 


ABG pCO2   


 


ABG pO2   


 


ABG HCO3   


 


ABG O2 Saturation   


 


ABG Base Excess   


 


FiO2   


 


Sodium   137.0 


 


Potassium   3.7 


 


Chloride   103 


 


Carbon Dioxide   22 


 


Anion Gap   12 


 


BUN   14 


 


Creatinine   0.63 


 


Est GFR ( Amer)   > 60 


 


Est GFR (Non-Af Amer)   > 60 


 


Glucose   98 


 


Lactic Acid  5.2 H   3.9 H


 


Calcium   7.1 L 


 


Magnesium   


 


Total Bilirubin   


 


AST   


 


ALT   


 


Alkaline Phosphatase   


 


Total Protein   


 


Albumin   


 


Blood Type   


 


Antibody Screen   














  02/12/19 02/12/19 02/12/19





  20:25 23:12 23:12


 


WBC    Cancelled


 


RBC    Cancelled


 


Hgb    Cancelled


 


Hct    Cancelled


 


MCV    Cancelled


 


MCH    Cancelled


 


MCHC    Cancelled


 


RDW    Cancelled


 


Plt Count    Cancelled


 


Seg Neutrophils %   


 


Lymphocytes %   


 


Monocytes %   


 


Eosinophils %   


 


Basophils %   


 


Absolute Neutrophils   


 


Absolute Lymphocytes   


 


Absolute Monocytes   


 


Absolute Eosinophils   


 


Absolute Basophils   


 


Carbonic Acid  0.68 L  0.97 L 


 


HCO3/H2CO3 Ratio  34:1  23:1 


 


ABG pH  7.64 H*  7.47 H 


 


ABG pCO2  22.6 L  32.3 L 


 


ABG pO2  108.8 H  113.4 H 


 


ABG HCO3  23.5  22.8 


 


ABG O2 Saturation  98.8 H  98.4 H 


 


ABG Base Excess  2.4  -0.9 


 


FiO2  30%  30% 


 


Sodium   


 


Potassium   


 


Chloride   


 


Carbon Dioxide   


 


Anion Gap   


 


BUN   


 


Creatinine   


 


Est GFR ( Amer)   


 


Est GFR (Non-Af Amer)   


 


Glucose   


 


Lactic Acid   


 


Calcium   


 


Magnesium   


 


Total Bilirubin   


 


AST   


 


ALT   


 


Alkaline Phosphatase   


 


Total Protein   


 


Albumin   


 


Blood Type   


 


Antibody Screen   














  02/12/19 02/12/19 02/12/19





  23:12 23:12 23:46


 


WBC    22.4 H


 


RBC    1.77 L


 


Hgb    5.6 L D


 


Hct    16.3 L


 


MCV    92


 


MCH    31.4


 


MCHC    34.3


 


RDW    16.6 H


 


Plt Count    79 L


 


Seg Neutrophils %   


 


Lymphocytes %   


 


Monocytes %   


 


Eosinophils %   


 


Basophils %   


 


Absolute Neutrophils   


 


Absolute Lymphocytes   


 


Absolute Monocytes   


 


Absolute Eosinophils   


 


Absolute Basophils   


 


Carbonic Acid   


 


HCO3/H2CO3 Ratio   


 


ABG pH   


 


ABG pCO2   


 


ABG pO2   


 


ABG HCO3   


 


ABG O2 Saturation   


 


ABG Base Excess   


 


FiO2   


 


Sodium  136.0 L  


 


Potassium  3.6  


 


Chloride  105  


 


Carbon Dioxide  25  


 


Anion Gap  6  


 


BUN  15  


 


Creatinine  0.73  


 


Est GFR ( Amer)  > 60  


 


Est GFR (Non-Af Amer)  > 60  


 


Glucose  101  


 


Lactic Acid   2.4 H 


 


Calcium  6.4 L*  


 


Magnesium   


 


Total Bilirubin  4.4 H  


 


AST  3908 H  


 


ALT  3022 H  


 


Alkaline Phosphatase  136 H  


 


Total Protein  3.0 L  


 


Albumin  1.8 L  


 


Blood Type   


 


Antibody Screen   














  02/13/19 02/13/19 02/13/19





  04:40 04:40 04:40


 


WBC  24.0 H  


 


RBC  2.48 L  


 


Hgb  7.8 L D  


 


Hct  22.6 L  


 


MCV  91  


 


MCH  31.2  


 


MCHC  34.3  


 


RDW  15.6 H  


 


Plt Count  69 L  


 


Seg Neutrophils %  Not Reportable  


 


Lymphocytes %  Not Reportable  


 


Monocytes %  Not Reportable  


 


Eosinophils %  Not Reportable  


 


Basophils %  Not Reportable  


 


Absolute Neutrophils  Not Reportable  


 


Absolute Lymphocytes  Not Reportable  


 


Absolute Monocytes  Not Reportable  


 


Absolute Eosinophils  Not Reportable  


 


Absolute Basophils  Not Reportable  


 


Carbonic Acid   


 


HCO3/H2CO3 Ratio   


 


ABG pH   


 


ABG pCO2   


 


ABG pO2   


 


ABG HCO3   


 


ABG O2 Saturation   


 


ABG Base Excess   


 


FiO2   


 


Sodium   138.2 


 


Potassium   3.6 


 


Chloride   106 


 


Carbon Dioxide   25 


 


Anion Gap   7 


 


BUN   16 


 


Creatinine   0.77 


 


Est GFR ( Amer)   > 60 


 


Est GFR (Non-Af Amer)   > 60 


 


Glucose   102 


 


Lactic Acid    1.7


 


Calcium   7.0 L* 


 


Magnesium   1.8 


 


Total Bilirubin   4.9 H 


 


AST   3057 H 


 


ALT   2819 H 


 


Alkaline Phosphatase   157 H 


 


Total Protein   2.9 L 


 


Albumin   2.0 L 


 


Blood Type   


 


Antibody Screen   














  02/13/19





  04:40


 


WBC 


 


RBC 


 


Hgb 


 


Hct 


 


MCV 


 


MCH 


 


MCHC 


 


RDW 


 


Plt Count 


 


Seg Neutrophils % 


 


Lymphocytes % 


 


Monocytes % 


 


Eosinophils % 


 


Basophils % 


 


Absolute Neutrophils 


 


Absolute Lymphocytes 


 


Absolute Monocytes 


 


Absolute Eosinophils 


 


Absolute Basophils 


 


Carbonic Acid  1.07


 


HCO3/H2CO3 Ratio  22:1


 


ABG pH  7.46 H


 


ABG pCO2  35.4


 


ABG pO2  98.8


 


ABG HCO3  24.3 H


 


ABG O2 Saturation  97.8


 


ABG Base Excess  0.5


 


FiO2  30%


 


Sodium 


 


Potassium 


 


Chloride 


 


Carbon Dioxide 


 


Anion Gap 


 


BUN 


 


Creatinine 


 


Est GFR ( Amer) 


 


Est GFR (Non-Af Amer) 


 


Glucose 


 


Lactic Acid 


 


Calcium 


 


Magnesium 


 


Total Bilirubin 


 


AST 


 


ALT 


 


Alkaline Phosphatase 


 


Total Protein 


 


Albumin 


 


Blood Type 


 


Antibody Screen 








                                        











  02/13/19 02/13/19





  06:04 06:04


 


Creatine Kinase  136 H 


 


CK-MB (CK-2)   3.43


 


Troponin I   0.033











Impressions: 


                                        





Abdomen/Pelvis CT  02/06/19 00:00


IMPRESSION:  Diverticulitis of the descending colon and sigmoid colon.  No 

definitive abscess or perforation.


Pigtail catheter in the lower abdomen.


 








Abdomen Ultrasound  02/07/19 00:00


IMPRESSION:


 


No acute sonographic abnormality.


 








Catheter Patency X-Ray  02/08/19 00:00


IMPRESSION:  SMALL RESIDUAL ABSCESS CAVITY.  FISTULOUS COMMUNICATION WITH THE 

DISTAL DESCENDING COLON WITH COLONIC FINDINGS AS DESCRIBED ABOVE.


 








Fluoroscopy  02/08/19 00:00


IMPRESSION:  SMALL RESIDUAL ABSCESS CAVITY.  FISTULOUS COMMUNICATION WITH THE 

DISTAL DESCENDING COLON WITH COLONIC FINDINGS AS DESCRIBED ABOVE.


 








Chest X-Ray  02/13/19 06:00


IMPRESSION:


 


No significant change.


 














Assessment & Plan





- Diagnosis


(1) Acute kidney failure with tubular necrosis


Is this a current diagnosis for this admission?: Yes   


Plan: 


Moderate amount of time patient was hypertensive subsequently she has been all 

but  anuric renal consult is pending








(2) Septic shock


Is this a current diagnosis for this admission?: Yes   


Plan: 


                              Labs- All tests 24 hr











  02/11/19 02/11/19 02/11/19





  13:10 13:32 21:45


 


WBC   20.0 H  32.4 H*


 


Hgb   10.1 L  9.2 L


 


Plt Count   344  279


 


ABG pH  7.17 L*  


 


ABG pCO2  48.2 H  


 


ABG pO2  150.8 H  


 


FiO2  40%  














  02/11/19 02/12/19 02/12/19





  21:53 03:54 03:54


 


WBC   23.6 H 


 


Hgb   8.8 L 


 


Plt Count   157 


 


ABG pH  7.10 L*   7.39


 


ABG pCO2  33.0 L   28.1 L


 


ABG pO2  481.2 H   149.5 H


 


FiO2  100%   40%














  02/12/19 02/12/19 02/12/19





  10:40 20:25 23:12


 


WBC   


 


Hgb   


 


Plt Count   


 


ABG pH  7.59 H  7.64 H*  7.47 H


 


ABG pCO2  22.7 L  22.6 L  32.3 L


 


ABG pO2  84.6  108.8 H  113.4 H


 


FiO2  30%  30%  30%














  02/12/19 02/13/19 02/13/19





  23:46 04:40 04:40


 


WBC  22.4 H  24.0 H 


 


Hgb  5.6 L D  7.8 L D 


 


Plt Count  79 L  69 L 


 


ABG pH    7.46 H


 


ABG pCO2    35.4


 


ABG pO2    98.8


 


FiO2    30%














  02/13/19





  14:10


 


WBC 


 


Hgb 


 


Plt Count 


 


ABG pH  7.45


 


ABG pCO2  34.3 L


 


ABG pO2  116.9 H


 


FiO2  30%








                                                                                











 02/11/19 18:30 Blood Culture - Preliminary





 Blood      Gram Positive Nito


 


 02/07/19 01:40 Gram Stain - Final





 Abdomen - Abscess Wound Culture - Final





      Escherichia Coli





      Enterococcus Faecalis(Group D)





      Prevotella Species





      Skin Monik


 


 02/06/19 16:00 Blood Culture - Final





 Blood      Clostridium Sp.not Perfringens





compensatory respiratory alkalosis








(3) Abdominal abscess


Is this a current diagnosis for this admission?: Yes   


Plan: 


Per surgery








- Time


Total Critical Time (Minutes): 45

## 2019-02-14 NOTE — PDOC PROGRESS REPORT
Subjective


Progress Note for:: 02/14/19


Subjective:: 





Intubated and sedated


Reason For Visit: 


DIVERTICULITIS,SIGMOID AND DESCENDING COLON,INTRA-








Physical Exam


Vital Signs: 


                                        











Temp Pulse Resp BP Pulse Ox


 


 96.2 F L  86   10 L  127/75 H  100 


 


 02/14/19 07:50  02/14/19 08:16  02/14/19 08:16  02/14/19 07:50  02/14/19 08:16








                                 Intake & Output











 02/13/19 02/14/19 02/15/19





 06:59 06:59 06:59


 


Intake Total 6651 1331 


 


Output Total 1301 1468 45


 


Balance 5350 -137 -45


 


Weight 92.3 kg 93 kg 











General appearance: PRESENT: other - intubated and sedated


Respiratory exam: PRESENT: clear to auscultation umu


Cardiovascular exam: PRESENT: tachycardia


GI/Abdominal exam: PRESENT: normal bowel sounds, soft, other - ileostomy pink 

with stools; 3 DAY drains with serosanguinous fluid


Extremities exam: PRESENT: +2 edema - all 4 extremities, warm, viable


Skin exam: PRESENT: other -  Abdominal wound, pink, with serous drainage, fascia

intact





Results


Laboratory Results: 


                                        





                                 02/14/19 04:16 





                                 02/14/19 04:16 





                                        











  02/10/19 02/13/19 02/13/19





  15:10 06:04 14:10


 


WBC   


 


RBC   


 


Hgb   


 


Hct   


 


MCV   


 


MCH   


 


MCHC   


 


RDW   


 


Plt Count   


 


Seg Neutrophils %   


 


Lymphocytes %   


 


Monocytes %   


 


Eosinophils %   


 


Basophils %   


 


Absolute Neutrophils   


 


Absolute Lymphocytes   


 


Absolute Monocytes   


 


Absolute Eosinophils   


 


Absolute Basophils   


 


Carbonic Acid    1.03 L


 


HCO3/H2CO3 Ratio    22:1


 


ABG pH    7.45


 


ABG pCO2    34.3 L


 


ABG pO2    116.9 H


 


ABG HCO3    23.1


 


ABG O2 Saturation    98.4 H


 


ABG Base Excess    -0.6


 


FiO2    30%


 


Sodium   


 


Potassium   


 


Chloride   


 


Carbon Dioxide   


 


Anion Gap   


 


BUN   


 


Creatinine   


 


Est GFR ( Amer)   


 


Est GFR (Non-Af Amer)   


 


Glucose   


 


Lactic Acid   


 


Calcium   


 


Phosphorus   


 


Magnesium   


 


Total Bilirubin   


 


AST   


 


ALT   


 


Alkaline Phosphatase   


 


Total Protein   


 


Albumin   


 


Prealbumin   5.1 L 


 


Triglycerides   41 


 


Blood Type  O POSITIVE  


 


Antibody Screen  NEGATIVE  














  02/13/19 02/13/19 02/14/19





  16:45 19:00 04:16


 


WBC  26.7 H  


 


RBC  2.90 L  


 


Hgb  9.0 L  


 


Hct  26.2 L  


 


MCV  90  


 


MCH  31.0  


 


MCHC  34.4  


 


RDW  15.7 H  


 


Plt Count  58 L  


 


Seg Neutrophils %   


 


Lymphocytes %   


 


Monocytes %   


 


Eosinophils %   


 


Basophils %   


 


Absolute Neutrophils   


 


Absolute Lymphocytes   


 


Absolute Monocytes   


 


Absolute Eosinophils   


 


Absolute Basophils   


 


Carbonic Acid    1.06


 


HCO3/H2CO3 Ratio    21:1


 


ABG pH    7.44


 


ABG pCO2    35.2


 


ABG pO2    113.2 H


 


ABG HCO3    23.3


 


ABG O2 Saturation    98.3 H


 


ABG Base Excess    -0.6


 


FiO2    30%


 


Sodium   138.6 


 


Potassium   3.7 


 


Chloride   106 


 


Carbon Dioxide   25 


 


Anion Gap   8 


 


BUN   18 


 


Creatinine   0.94 


 


Est GFR ( Amer)   > 60 


 


Est GFR (Non-Af Amer)   > 60 


 


Glucose   126 H 


 


Lactic Acid   


 


Calcium   7.1 L 


 


Phosphorus   


 


Magnesium   


 


Total Bilirubin   


 


AST   


 


ALT   


 


Alkaline Phosphatase   


 


Total Protein   


 


Albumin   


 


Prealbumin   


 


Triglycerides   


 


Blood Type   


 


Antibody Screen   














  02/14/19 02/14/19 02/14/19





  04:16 04:16 04:16


 


WBC  25.5 H  


 


RBC  2.80 L  


 


Hgb  8.7 L  


 


Hct  25.1 L  


 


MCV  90  


 


MCH  31.0  


 


MCHC  34.5  


 


RDW  15.9 H  


 


Plt Count  54 L  


 


Seg Neutrophils %  Not Reportable  


 


Lymphocytes %  Not Reportable  


 


Monocytes %  Not Reportable  


 


Eosinophils %  Not Reportable  


 


Basophils %  Not Reportable  


 


Absolute Neutrophils  Not Reportable  


 


Absolute Lymphocytes  Not Reportable  


 


Absolute Monocytes  Not Reportable  


 


Absolute Eosinophils  Not Reportable  


 


Absolute Basophils  Not Reportable  


 


Carbonic Acid   


 


HCO3/H2CO3 Ratio   


 


ABG pH   


 


ABG pCO2   


 


ABG pO2   


 


ABG HCO3   


 


ABG O2 Saturation   


 


ABG Base Excess   


 


FiO2   


 


Sodium   138.4 


 


Potassium   3.6 


 


Chloride   106 


 


Carbon Dioxide   25 


 


Anion Gap   7 


 


BUN   22 H 


 


Creatinine   1.19 


 


Est GFR ( Amer)   56 L 


 


Est GFR (Non-Af Amer)   47 L 


 


Glucose   181 H 


 


Lactic Acid    1.8


 


Calcium   7.3 L 


 


Phosphorus   2.5 


 


Magnesium   1.9 


 


Total Bilirubin   4.2 H 


 


AST   619 H 


 


ALT   1685 H 


 


Alkaline Phosphatase   184 H 


 


Total Protein   3.4 L 


 


Albumin   2.0 L 


 


Prealbumin   4.9 L 


 


Triglycerides   


 


Blood Type   


 


Antibody Screen   








                                        











  02/13/19 02/13/19





  06:04 06:04


 


Creatine Kinase  136 H 


 


CK-MB (CK-2)   3.43


 


Troponin I   0.033











Impressions: 


                                        





Abdomen/Pelvis CT  02/06/19 00:00


IMPRESSION:  Diverticulitis of the descending colon and sigmoid colon.  No 

definitive abscess or perforation.


Pigtail catheter in the lower abdomen.


 








Abdomen Ultrasound  02/07/19 00:00


IMPRESSION:


 


No acute sonographic abnormality.


 








Catheter Patency X-Ray  02/08/19 00:00


IMPRESSION:  SMALL RESIDUAL ABSCESS CAVITY.  FISTULOUS COMMUNICATION WITH THE 

DISTAL DESCENDING COLON WITH COLONIC FINDINGS AS DESCRIBED ABOVE.


 








Fluoroscopy  02/08/19 00:00


IMPRESSION:  SMALL RESIDUAL ABSCESS CAVITY.  FISTULOUS COMMUNICATION WITH THE 

DISTAL DESCENDING COLON WITH COLONIC FINDINGS AS DESCRIBED ABOVE.


 








Chest X-Ray  02/13/19 06:00


IMPRESSION:


 


No significant change.


 














Assessment & Plan





- Diagnosis


(1) Colonic fistula


Is this a current diagnosis for this admission?: Yes   





(2) Diverticulitis large intestine


Qualifiers: 


   Diverticulitis bleeding: without bleeding   Diverticulitis complication: with

perforation and abscess   Qualified Code(s): K57.20 - Diverticulitis of large 

intestine with perforation and abscess without bleeding   


Is this a current diagnosis for this admission?: Yes   





(3) Abscess of abdominal cavity


Is this a current diagnosis for this admission?: Yes   





- Plan Summary


Plan Summary: 





A/





POD#3 after total colectomy, endileostomy, Laura's pouch


VSS, AF


Anuria


Moderately elevated serosanguinous output from each DAY drain 


Respiratory:  normal ABG's, no acidosis


CV: tachycardia


GI: stools in ileostomy bag


Renal: BUN/creatinine 22/1.19 today despite anuria, reason unclear; possible low

protein or albumin might be the cause of this low values despite suspected ATN


Creatinine from DAY drain < 3.2, not consistent with ureteral injury and 

intraperitoneal urine leak (it should be > 10 times blood creatinine)


Metabolic: elevated bilirubin, transimitis as per possible shock liver picture








P/





Continue endotracheal intubation and CV suppport


Renal consult pending


No dialysis contemplated at this time; no acidosis, hyperkalemia, or sign of CHF

 present; however, will wait for Nephrology recommendation


Start Tube Feeding, Dietician consult


Send urine for Na and Creatinine isaac calculate FENA even though she has received

several doses of Lasix w/o response

## 2019-02-14 NOTE — PDOC PROGRESS REPORT
Subjective


Progress Note for:: 02/14/19


Subjective:: 





No adverse events overnight.  Patient's blood pressure has improved and she 

remains off pressors.  Heart rate has remained stable.  She is been afebrile.  

She still had little urine output.  Creatinine is starting to trend back up 

along with BUN.


Reason For Visit: 


DIVERTICULITIS,SIGMOID AND DESCENDING COLON,INTRA-








Physical Exam


Vital Signs: 


                                        











Temp Pulse Resp BP Pulse Ox


 


 96.8 F L  98   9 L  127/78 H  99 


 


 02/14/19 18:00  02/14/19 16:00  02/14/19 18:00  02/14/19 16:23  02/14/19 18:00








                                 Intake & Output











 02/13/19 02/14/19 02/15/19





 06:59 06:59 06:59


 


Intake Total 6651 1431 1202


 


Output Total 1301 1468 425


 


Balance 5350 -37 777


 


Weight 92.3 kg 93 kg 








General appearance: PRESENT: severe distress, other - Sedated, intubated


Respiratory exam: PRESENT: crackles - Bilateral, symmetrical, unlabored.  ABSE

NT: prolonged expiratory phas, rhonchi, tachypnea, wheezes


Cardiovascular exam: PRESENT: +S1, +S2, tachycardia


Vascular exam: ABSENT: normal capillary refill - Capillary refill was 6 seconds


GI/Abdominal exam: PRESENT: diminished bowel sounds, distended, soft, other - 

Midline abdominal decision was covered with a bulky dressing, she had 3 DAY 

drains.  ABSENT: rebound, tenderness


Extremities exam: PRESENT: pedal edema, other - She has 3+ or greater 

generalized pitting edema in her upper and lower extremities as well as diffuse 

anasarca.  ABSENT: clubbing


Musculoskeletal exam: ABSENT: deformity, tenderness


Neurological exam: PRESENT: other - Sedated, intubated


Skin exam: PRESENT: jaundice, mottled.  ABSENT: warm





Results


Laboratory Results: 


                                        





                                 02/14/19 04:16 





                                 02/14/19 12:20 





                                        











  02/13/19 02/14/19 02/14/19





  19:00 04:16 04:16


 


WBC    25.5 H


 


RBC    2.80 L


 


Hgb    8.7 L


 


Hct    25.1 L


 


MCV    90


 


MCH    31.0


 


MCHC    34.5


 


RDW    15.9 H


 


Plt Count    54 L


 


Seg Neutrophils %    Not Reportable


 


Lymphocytes %    Not Reportable


 


Monocytes %    Not Reportable


 


Eosinophils %    Not Reportable


 


Basophils %    Not Reportable


 


Absolute Neutrophils    Not Reportable


 


Absolute Lymphocytes    Not Reportable


 


Absolute Monocytes    Not Reportable


 


Absolute Eosinophils    Not Reportable


 


Absolute Basophils    Not Reportable


 


Carbonic Acid   1.06 


 


HCO3/H2CO3 Ratio   21:1 


 


ABG pH   7.44 


 


ABG pCO2   35.2 


 


ABG pO2   113.2 H 


 


ABG HCO3   23.3 


 


ABG O2 Saturation   98.3 H 


 


ABG Base Excess   -0.6 


 


FiO2   30% 


 


Sodium  138.6  


 


Potassium  3.7  


 


Chloride  106  


 


Carbon Dioxide  25  


 


Anion Gap  8  


 


BUN  18  


 


Creatinine  0.94  


 


Est GFR ( Amer)  > 60  


 


Est GFR (Non-Af Amer)  > 60  


 


Glucose  126 H  


 


Lactic Acid   


 


Calcium  7.1 L  


 


Phosphorus   


 


Magnesium   


 


Total Bilirubin   


 


AST   


 


ALT   


 


Alkaline Phosphatase   


 


Total Protein   


 


Albumin   


 


Prealbumin   


 


Urine Color   


 


Urine Appearance   


 


Urine pH   


 


Ur Specific Gravity   


 


Urine Protein   


 


Urine Glucose (UA)   


 


Urine Ketones   


 


Urine Blood   


 


Urine Nitrite   


 


Ur Leukocyte Esterase   


 


Urine RBC (Auto)   














  02/14/19 02/14/19 02/14/19





  04:16 04:16 12:20


 


WBC   


 


RBC   


 


Hgb   


 


Hct   


 


MCV   


 


MCH   


 


MCHC   


 


RDW   


 


Plt Count   


 


Seg Neutrophils %   


 


Lymphocytes %   


 


Monocytes %   


 


Eosinophils %   


 


Basophils %   


 


Absolute Neutrophils   


 


Absolute Lymphocytes   


 


Absolute Monocytes   


 


Absolute Eosinophils   


 


Absolute Basophils   


 


Carbonic Acid   


 


HCO3/H2CO3 Ratio   


 


ABG pH   


 


ABG pCO2   


 


ABG pO2   


 


ABG HCO3   


 


ABG O2 Saturation   


 


ABG Base Excess   


 


FiO2   


 


Sodium  138.4   138.6


 


Potassium  3.6   3.7


 


Chloride  106   105


 


Carbon Dioxide  25   24


 


Anion Gap  7   10


 


BUN  22 H   26 H


 


Creatinine  1.19   1.25


 


Est GFR ( Amer)  56 L   53 L


 


Est GFR (Non-Af Amer)  47 L   44 L


 


Glucose  181 H   196 H


 


Lactic Acid   1.8 


 


Calcium  7.3 L   7.3 L


 


Phosphorus  2.5  


 


Magnesium  1.9  


 


Total Bilirubin  4.2 H  


 


AST  619 H  


 


ALT  1685 H  


 


Alkaline Phosphatase  184 H  


 


Total Protein  3.4 L  


 


Albumin  2.0 L  


 


Prealbumin  4.9 L  


 


Urine Color   


 


Urine Appearance   


 


Urine pH   


 


Ur Specific Gravity   


 


Urine Protein   


 


Urine Glucose (UA)   


 


Urine Ketones   


 


Urine Blood   


 


Urine Nitrite   


 


Ur Leukocyte Esterase   


 


Urine RBC (Auto)   














  02/14/19





  15:03


 


WBC 


 


RBC 


 


Hgb 


 


Hct 


 


MCV 


 


MCH 


 


MCHC 


 


RDW 


 


Plt Count 


 


Seg Neutrophils % 


 


Lymphocytes % 


 


Monocytes % 


 


Eosinophils % 


 


Basophils % 


 


Absolute Neutrophils 


 


Absolute Lymphocytes 


 


Absolute Monocytes 


 


Absolute Eosinophils 


 


Absolute Basophils 


 


Carbonic Acid 


 


HCO3/H2CO3 Ratio 


 


ABG pH 


 


ABG pCO2 


 


ABG pO2 


 


ABG HCO3 


 


ABG O2 Saturation 


 


ABG Base Excess 


 


FiO2 


 


Sodium 


 


Potassium 


 


Chloride 


 


Carbon Dioxide 


 


Anion Gap 


 


BUN 


 


Creatinine 


 


Est GFR ( Amer) 


 


Est GFR (Non-Af Amer) 


 


Glucose 


 


Lactic Acid 


 


Calcium 


 


Phosphorus 


 


Magnesium 


 


Total Bilirubin 


 


AST 


 


ALT 


 


Alkaline Phosphatase 


 


Total Protein 


 


Albumin 


 


Prealbumin 


 


Urine Color  YELLOW


 


Urine Appearance  CLOUDY


 


Urine pH  5.0


 


Ur Specific Gravity  1.015


 


Urine Protein  100 H


 


Urine Glucose (UA)  NEGATIVE


 


Urine Ketones  NEGATIVE


 


Urine Blood  LARGE H


 


Urine Nitrite  NEGATIVE


 


Ur Leukocyte Esterase  TRACE H


 


Urine RBC (Auto)  24








                                        





02/11/19 18:30   Blood   Blood Culture - Final


                            Clostridium Sp.not Perfringens





                                        











  02/13/19 02/13/19





  06:04 06:04


 


Creatine Kinase  136 H 


 


CK-MB (CK-2)   3.43


 


Troponin I   0.033











Impressions: 


                                        





Abdomen/Pelvis CT  02/06/19 00:00


IMPRESSION:  Diverticulitis of the descending colon and sigmoid colon.  No 

definitive abscess or perforation.


Pigtail catheter in the lower abdomen.


 








Abdomen Ultrasound  02/07/19 00:00


IMPRESSION:


 


No acute sonographic abnormality.


 








Catheter Patency X-Ray  02/08/19 00:00


IMPRESSION:  SMALL RESIDUAL ABSCESS CAVITY.  FISTULOUS COMMUNICATION WITH THE 

DISTAL DESCENDING COLON WITH COLONIC FINDINGS AS DESCRIBED ABOVE.


 








Fluoroscopy  02/08/19 00:00


IMPRESSION:  SMALL RESIDUAL ABSCESS CAVITY.  FISTULOUS COMMUNICATION WITH THE 

DISTAL DESCENDING COLON WITH COLONIC FINDINGS AS DESCRIBED ABOVE.


 








Chest X-Ray  02/14/19 00:00


IMPRESSION:  Satisfactory position of support apparatus.  No pneumothorax.


 














Assessment & Plan





- Diagnosis


(1) Septic shock


Is this a current diagnosis for this admission?: Yes   


Plan: 


She has evidence of multisystem organ failure, but there are some signs of 

improvement.  Her creatinine has improved and she has had improvement in her 

blood pressure.  She is on broad-spectrum antibiotic coverage for likely 

organisms.  Liver enzymes have improved.  She is off the Cardizem drip and her 

heart rate is holding stable and her blood pressure improved.  She got a couple 

more units of blood yesterday.  Fluid from the DAY drains was negative for 

creatinine.  Felt to be unlikely that she has a ureteral injury that is draining

urine into the belly.  She was seen in consultation by nephrology today, and 

they are challenging her with a large dose of Lasix and some albumin.  She has 

had a dialysis catheter placed in anticipation that she may need hemodialysis 

tomorrow due to refractory fluid overload.  We will continue aggressive 

supportive care.








(2) Colonic fistula


Is this a current diagnosis for this admission?: Yes   


Plan: 


Management per surgery








- Time


Time Spent with patient: 25-34 minutes

## 2019-02-14 NOTE — PDOC CONSULTATION
Consultation


Consult Date: 02/14/19


Attending physician:: MICHAEL GALLARDO


Consult reason:: I was asked to see the patient because of anuria in a patient 

with septic shock.





History of Present Illness


Admission Date/PCP: 


  02/06/19 16:44





  DEBBIE GUTIERREZ DO





History of Present Illness: 


JARVIS WELCH is a 58 year old female with history of ulcerative colitis and 

intra-abdominal abscess who was admitted on February 6, 2019 initially because 

of obstruction in her drain.  She was initially treated with IV antibiotics.  On

February 11 she underwent subtotal colectomy with colostomy.  Subsequently the 

patient went into septic shock, associated with very severe hypotension and 

respiratory failure.  Currently the patient remains to be intubated.  On 

February 11 her systolic blood pressure went down as low as 35/18 to 62/46 

requiring 4 vasopressors.  She was resuscitated with a lot of fluids and her 

intake and output balance as of this time is approximately 15 L positive.  For 

the last 3 days the patient's urine output has significantly diminished.  On 

February 11 she made 707 mL of urine yesterday, the next day it was 116 mL and 

yesterday it was only 58 mL.  Aggressive fluid resuscitation on February 11.  It

went up to 1.82 days ago and is currently she has an albumin of 2.0.  She is 

currently on TPN.  In terms of her kidney function on admission she had a BUN of

27, creatinine of 0.51 with estimated GFR greater than 60 today she had a BUN of

26, creatinine of 1.25 and estimated GFR of 44.  Baseline creatinine in January 2019 range anywhere between 0.28-0.34 and sometime in November 2018 the 

creatinine ranged from 0.37-0.45.  Patient is currently on IV antibiotics per ID

recommendation.  Abdominal ultrasound February 7 showed right kidney measuring 

11.1 cm and left kidney measuring at 12.2 cm without any nor nephrolithiasis.  

Patient was given a combination albumin and IV Lasix up to 80 mg IV at one-point

without significant response in terms of urine output.  As of this time the 

patient's blood pressure is good without any vasopressors.








Past Medical History


GI Medical History: Reports: Diverticulitis, Ulcerative Colitis


Musculoskeltal Medical History: Reports: Arthritis - psoriatic


Skin Medical History: Reports: Psoriasis


Psychiatric Medical History: Reports: Depression





Past Surgical History


Past Surgical History: Reports: Cholecystectomy, Colectomy, Colostomy, 

Hysterectomy, Orthopedic Surgery - right knee, left hand





Social History


Information Source: [facility]


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


Smoking Status: Former Smoker


Frequency of Alcohol Use: None


Drugs: None





- Advance Directive


Resuscitation Status: Full Code





Family History


Family History: 





Unable to be obtained due to being intubated.


Parental Family History Reviewed: No


Children Family History Reviewed: No


Sibling(s) Family History Reviewed.: No





Medication/Allergy


Home Medications: 








Fluoxetine HCl [Prozac] 40 mg PO DAILY 01/09/19 


Fluticasone Propionate [Flonase Nasal Spray 50 Mcg/Spray 16 gm] 2 spray NASL 

DAILYP PRN 01/09/19 


Gabapentin [Neurontin 300 mg Capsule] 300 mg PO DAILY 01/09/19 


Gabapentin [Neurontin 300 mg Capsule] 600 mg PO QHS 01/09/19 


Oxycodone HCl/Acetaminophen [Percocet 5-325 mg Tablet] 0.5 tab PO Q6HP PRN 02/06 /19 








Allergies/Adverse Reactions: 


                                        





pineapple Allergy (Mild, Verified 02/06/19 12:14)


   


latex [Latex] Allergy (Verified 02/06/19 12:14)


   itching


nickel [Nickel] Allergy (Verified 02/06/19 12:14)


   ITCHY RASH


sulfamethoxazole [From Bactrim] Allergy (Verified 02/06/19 12:14)


   red rash


trimethoprim [From Bactrim] Allergy (Verified 02/06/19 12:14)


   red rash


MERCURY Allergy (Uncoded 02/06/19 12:14)


   ITCHY RASH











Review of Systems


ROS unobtainable: Due to endotracheal tube, Due to mental status





Physical Exam


Vital Signs: 


                                        











Temp Pulse Resp BP Pulse Ox


 


 95.9 F L  87   10 L  138/88 H  100 


 


 02/14/19 12:00  02/14/19 12:00  02/14/19 12:00  02/14/19 12:00  02/14/19 12:00








                                 Intake & Output











 02/13/19 02/14/19 02/15/19





 06:59 06:59 06:59


 


Intake Total 5433 1701 52


 


Output Total 3455 1338 265


 


Balance 5350 -37 -213


 


Weight 92.3 kg 93 kg 











Exam: 





General appearance: Patient intubated and still sedated even though off sedation


Head exam: PRESENT: atraumatic, normocephalic


Eye exam: PRESENT: Eyes are closed with significant facial edema


Mouth exam: PRESENT: moist, neck supple, tongue midline


Neck exam: PRESENT: full ROM.  ABSENT: carotid bruit, JVD, lymphadenopathy, 

thyromegaly


Respiratory exam: PRESENT: Diminished to auscultation bilaterally.  ABSENT: 

rales, rhonchi, stridor, wheezes


Cardiovascular exam: PRESENT: RRR, +S1, +S2.  ABSENT: systolic murmur


Pulses: PRESENT: normal radial pulses, normal dorsalis pedis pulses


GI/Abdominal exam: PRESENT: Very hypoactive bowel sounds, soft.  Positive 

colostomy on right lower quadrant with 3 DAY drains.  Positive subcutaneous edema

ABSENT: guarding, mass, tenderness


Rectal exam: Deferred


Extremities exam: PRESENT: full ROM.  Patient has grade 4 bilateral lower 

extremity edema, upper extremity edema and overall anasarca ABSENT: calf 

tenderness


Musculoskeletal: PRESENT: full ROM.  ABSENT: deformity


Neurological exam: PRESENT: Still sedated


Psychiatric exam: Cannot be assessed at this time


Skin exam: PRESENT: intact, dry, warm.  ABSENT: rash





Results


Laboratory Results: 


                                        





                                 02/14/19 04:16 





                                 02/14/19 12:20 





                                        











  02/13/19 02/13/19 02/13/19





  14:10 16:45 19:00


 


WBC   26.7 H 


 


RBC   2.90 L 


 


Hgb   9.0 L 


 


Hct   26.2 L 


 


MCV   90 


 


MCH   31.0 


 


MCHC   34.4 


 


RDW   15.7 H 


 


Plt Count   58 L 


 


Seg Neutrophils %   


 


Lymphocytes %   


 


Monocytes %   


 


Eosinophils %   


 


Basophils %   


 


Absolute Neutrophils   


 


Absolute Lymphocytes   


 


Absolute Monocytes   


 


Absolute Eosinophils   


 


Absolute Basophils   


 


Carbonic Acid  1.03 L  


 


HCO3/H2CO3 Ratio  22:1  


 


ABG pH  7.45  


 


ABG pCO2  34.3 L  


 


ABG pO2  116.9 H  


 


ABG HCO3  23.1  


 


ABG O2 Saturation  98.4 H  


 


ABG Base Excess  -0.6  


 


FiO2  30%  


 


Sodium    138.6


 


Potassium    3.7


 


Chloride    106


 


Carbon Dioxide    25


 


Anion Gap    8


 


BUN    18


 


Creatinine    0.94


 


Est GFR ( Amer)    > 60


 


Est GFR (Non-Af Amer)    > 60


 


Glucose    126 H


 


Lactic Acid   


 


Calcium    7.1 L


 


Phosphorus   


 


Magnesium   


 


Total Bilirubin   


 


AST   


 


ALT   


 


Alkaline Phosphatase   


 


Total Protein   


 


Albumin   


 


Prealbumin   














  02/14/19 02/14/19 02/14/19





  04:16 04:16 04:16


 


WBC   25.5 H 


 


RBC   2.80 L 


 


Hgb   8.7 L 


 


Hct   25.1 L 


 


MCV   90 


 


MCH   31.0 


 


MCHC   34.5 


 


RDW   15.9 H 


 


Plt Count   54 L 


 


Seg Neutrophils %   Not Reportable 


 


Lymphocytes %   Not Reportable 


 


Monocytes %   Not Reportable 


 


Eosinophils %   Not Reportable 


 


Basophils %   Not Reportable 


 


Absolute Neutrophils   Not Reportable 


 


Absolute Lymphocytes   Not Reportable 


 


Absolute Monocytes   Not Reportable 


 


Absolute Eosinophils   Not Reportable 


 


Absolute Basophils   Not Reportable 


 


Carbonic Acid  1.06  


 


HCO3/H2CO3 Ratio  21:1  


 


ABG pH  7.44  


 


ABG pCO2  35.2  


 


ABG pO2  113.2 H  


 


ABG HCO3  23.3  


 


ABG O2 Saturation  98.3 H  


 


ABG Base Excess  -0.6  


 


FiO2  30%  


 


Sodium    138.4


 


Potassium    3.6


 


Chloride    106


 


Carbon Dioxide    25


 


Anion Gap    7


 


BUN    22 H


 


Creatinine    1.19


 


Est GFR ( Amer)    56 L


 


Est GFR (Non-Af Amer)    47 L


 


Glucose    181 H


 


Lactic Acid   


 


Calcium    7.3 L


 


Phosphorus    2.5


 


Magnesium    1.9


 


Total Bilirubin    4.2 H


 


AST    619 H


 


ALT    1685 H


 


Alkaline Phosphatase    184 H


 


Total Protein    3.4 L


 


Albumin    2.0 L


 


Prealbumin    4.9 L














  02/14/19 02/14/19





  04:16 12:20


 


WBC  


 


RBC  


 


Hgb  


 


Hct  


 


MCV  


 


MCH  


 


MCHC  


 


RDW  


 


Plt Count  


 


Seg Neutrophils %  


 


Lymphocytes %  


 


Monocytes %  


 


Eosinophils %  


 


Basophils %  


 


Absolute Neutrophils  


 


Absolute Lymphocytes  


 


Absolute Monocytes  


 


Absolute Eosinophils  


 


Absolute Basophils  


 


Carbonic Acid  


 


HCO3/H2CO3 Ratio  


 


ABG pH  


 


ABG pCO2  


 


ABG pO2  


 


ABG HCO3  


 


ABG O2 Saturation  


 


ABG Base Excess  


 


FiO2  


 


Sodium   138.6


 


Potassium   3.7


 


Chloride   105


 


Carbon Dioxide   24


 


Anion Gap   10


 


BUN   26 H


 


Creatinine   1.25


 


Est GFR ( Amer)   53 L


 


Est GFR (Non-Af Amer)   44 L


 


Glucose   196 H


 


Lactic Acid  1.8 


 


Calcium   7.3 L


 


Phosphorus  


 


Magnesium  


 


Total Bilirubin  


 


AST  


 


ALT  


 


Alkaline Phosphatase  


 


Total Protein  


 


Albumin  


 


Prealbumin  








                                        











  02/13/19 02/13/19





  06:04 06:04


 


Creatine Kinase  136 H 


 


CK-MB (CK-2)   3.43


 


Troponin I   0.033











Impressions: 


                                        





Abdomen/Pelvis CT  02/06/19 00:00


IMPRESSION:  Diverticulitis of the descending colon and sigmoid colon.  No 

definitive abscess or perforation.


Pigtail catheter in the lower abdomen.


 








Abdomen Ultrasound  02/07/19 00:00


IMPRESSION:


 


No acute sonographic abnormality.


 








Catheter Patency X-Ray  02/08/19 00:00


IMPRESSION:  SMALL RESIDUAL ABSCESS CAVITY.  FISTULOUS COMMUNICATION WITH THE 

DISTAL DESCENDING COLON WITH COLONIC FINDINGS AS DESCRIBED ABOVE.


 








Fluoroscopy  02/08/19 00:00


IMPRESSION:  SMALL RESIDUAL ABSCESS CAVITY.  FISTULOUS COMMUNICATION WITH THE 

DISTAL DESCENDING COLON WITH COLONIC FINDINGS AS DESCRIBED ABOVE.


 








Chest X-Ray  02/13/19 06:00


IMPRESSION:


 


No significant change.


 














Assessment & Plan





- Diagnosis


(1) Acute kidney failure with tubular necrosis


Is this a current diagnosis for this admission?: Yes   


Plan: 


This is due to septic shock and intra-abdominal sepsis.  Patient is currently 

anuric and 50 mL fluid overloaded.  Although the patient's BUN and creatinine 

appear to be low, these are increased more than 50% from baseline.  Her BUN and 

creatinine are low because of severe malnutrition.


For today I am going to try to give the patient again 25 g of IV albumin 

followed by 200 mg of IV Lasix x1 dose.


Due to anuria and severe anasarca the patient would need an acute renal 

replacement therapy.  I talked to the patient's 2 sons, Elvis and Arsh 

regarding the hemodialysis procedure, the benefits and the risks of the 

procedure to include bleeding, infection, hemodynamic instability including 

hypotension and cardiac arrest and they both agreed to proceed.  We will request

surgery to put the trialysis catheter and will plan for first dialysis treatment

tomorrow.


Meanwhile avoid further nephrotoxic medications.  Adjust medications accordingly

per kidney function.  Continue to monitor kidney function and electrolytes and  

accordingly.








(2) Septic shock


Is this a current diagnosis for this admission?: Yes   





(3) Anasarca


Is this a current diagnosis for this admission?: Yes   


Plan: 


Due to severe hypoalbuminemia and acute kidney injury with anuria.








(4) Hypoalbuminemia


Is this a current diagnosis for this admission?: Yes   





(5) Malnutrition


Is this a current diagnosis for this admission?: Yes   





(6) Colonic fistula


Is this a current diagnosis for this admission?: Yes   





(7) Diverticulitis large intestine


Qualifiers: 


   Diverticulitis bleeding: without bleeding   Diverticulitis complication: with

perforation and abscess   Qualified Code(s): K57.20 - Diverticulitis of large 

intestine with perforation and abscess without bleeding   


Is this a current diagnosis for this admission?: Yes   





(8) Abscess of abdominal cavity


Is this a current diagnosis for this admission?: Yes   





(9) Anemia


Qualifiers: 


   Anemia type: other cause 


Is this a current diagnosis for this admission?: Yes   





- Notes


Notes: 





Thank you very much for this consultation.  I will follow the patient with you. 

Assessment and plan discussed with the patient's 2 sons, Elvis and Arsh.  Plan 

also discussed with her nurse today.





- Time


Time Spent: 50 to 70 Minutes

## 2019-02-14 NOTE — RADIOLOGY REPORT (SQ)
EXAM DESCRIPTION:  CHEST SINGLE VIEW



COMPLETED DATE/TIME:  2/14/2019 5:07 pm



REASON FOR STUDY:  s/p central line exchange



COMPARISON:  2/13/2019



NUMBER OF VIEWS:  One view.



TECHNIQUE:  Single frontal radiographic image of the chest acquired.



LIMITATIONS:  None.



FINDINGS:  LUNGS AND PLEURA: Slight improved aeration in the right lung.  Persistent airspace disease
 in the left lung.  Small pleural effusions.  No pneumothorax.

MEDIASTINUM AND HEART: Stable heart size and mediastinal structures.

SUPPORT DEVICES: Appropriate position of endotracheal tube, nasogastric tube and right subclavian gilmer
tral line.

BONY STRUCTURES: No acute findings.

HARDWARE: None.

OTHER: No other significant finding.



IMPRESSION:  Satisfactory position of support apparatus.  No pneumothorax.



Reading location - IP/workstation name: [email or web address]

## 2019-02-14 NOTE — OPERATIVE REPORT
Nonrecallable Operative Report


DATE OF SURGERY: 02/14/19


PREOPERATIVE DIAGNOSIS: Need of trialysis catheter.  Need of replacement of 

right subclavian vein CVL


POSTOPERATIVE DIAGNOSIS: same


OPERATION: 1) Placement of left CFV Trialyis catheter.  2) Replacement of right 

CVL over wire


SURGEON: STEF CIFUENTES


ANESTHESIA: Local - 10 mL 1% lidocaine


TISSUE REMOVED OR ALTERED: n/a


COMPLICATIONS: 





none


ESTIMATED BLOOD LOSS: 10 mL


INTRAOPERATIVE FINDINGS: as above


PROCEDURE: 





see dictation

## 2019-02-15 LAB
ABSOLUTE LYMPHOCYTES# (MANUAL): 1.4 10^3/UL (ref 0.5–4.7)
ABSOLUTE MONOCYTES # (MANUAL): 0.7 10^3/UL (ref 0.1–1.4)
ABSOLUTE NEUTROPHILS# (MANUAL): 20.7 10^3/UL (ref 1.7–8.2)
ADD MANUAL DIFF: YES
ALBUMIN SERPL-MCNC: 1.9 G/DL (ref 3.5–5)
ALP SERPL-CCNC: 178 U/L (ref 38–126)
ALT SERPL-CCNC: 793 U/L (ref 9–52)
ANION GAP SERPL CALC-SCNC: 9 MMOL/L (ref 5–19)
ANISOCYTOSIS BLD QL SMEAR: (no result)
ARTERIAL BLOOD FIO2: (no result)
ARTERIAL BLOOD H2CO3: 1.09 MMOL/L (ref 1.05–1.35)
ARTERIAL BLOOD HCO3: 22.5 MMOL/L (ref 20–24)
ARTERIAL BLOOD PCO2: 36.2 MMHG (ref 35–45)
ARTERIAL BLOOD PH: 7.41 (ref 7.35–7.45)
ARTERIAL BLOOD PO2: 116.2 MMHG (ref 80–100)
ARTERIAL BLOOD TOTAL CO2: 23.6 MMOL/L (ref 21–25)
AST SERPL-CCNC: 159 U/L (ref 14–36)
BASE EXCESS BLDA CALC-SCNC: -1.9 MMOL/L
BASOPHILS NFR BLD MANUAL: 0 % (ref 0–2)
BILIRUB DIRECT SERPL-MCNC: 2.6 MG/DL (ref 0–0.4)
BILIRUB SERPL-MCNC: 3.2 MG/DL (ref 0.2–1.3)
BUN SERPL-MCNC: 33 MG/DL (ref 7–20)
CALCIUM: 7.3 MG/DL (ref 8.4–10.2)
CHLORIDE SERPL-SCNC: 106 MMOL/L (ref 98–107)
CO2 SERPL-SCNC: 23 MMOL/L (ref 22–30)
EOSINOPHIL NFR BLD MANUAL: 0 % (ref 0–6)
ERYTHROCYTE [DISTWIDTH] IN BLOOD BY AUTOMATED COUNT: 16.6 % (ref 11.5–14)
ERYTHROCYTE [DISTWIDTH] IN BLOOD BY AUTOMATED COUNT: 16.6 % (ref 11.5–14)
GLUCOSE SERPL-MCNC: 190 MG/DL (ref 75–110)
HCT VFR BLD CALC: 20.3 % (ref 36–47)
HCT VFR BLD CALC: 29.9 % (ref 36–47)
HGB BLD-MCNC: 10.3 G/DL (ref 12–15.5)
HGB BLD-MCNC: 7 G/DL (ref 12–15.5)
MCH RBC QN AUTO: 30.3 PG (ref 27–33.4)
MCH RBC QN AUTO: 31.2 PG (ref 27–33.4)
MCHC RBC AUTO-ENTMCNC: 34.3 G/DL (ref 32–36)
MCHC RBC AUTO-ENTMCNC: 34.4 G/DL (ref 32–36)
MCV RBC AUTO: 88 FL (ref 80–97)
MCV RBC AUTO: 91 FL (ref 80–97)
MONOCYTES % (MANUAL): 3 % (ref 3–13)
NEUTS BAND NFR BLD MANUAL: 3 % (ref 3–5)
PHOSPHATE SERPL-MCNC: 2.1 MG/DL (ref 2.5–4.5)
PLATELET # BLD: 35 10^3/UL (ref 150–450)
PLATELET # BLD: 38 10^3/UL (ref 150–450)
PLATELET COMMENT: (no result)
POTASSIUM SERPL-SCNC: 3.8 MMOL/L (ref 3.6–5)
PREALB SERPL-MCNC: 5.7 MG/DL (ref 17.6–36)
PROT SERPL-MCNC: 3.3 G/DL (ref 6.3–8.2)
RBC # BLD AUTO: 2.23 10^6/UL (ref 3.72–5.28)
RBC # BLD AUTO: 3.4 10^6/UL (ref 3.72–5.28)
SAO2 % BLDA: 98.3 % (ref 94–98)
SEGMENTED NEUTROPHILS % (MAN): 88 % (ref 42–78)
SODIUM SERPL-SCNC: 138.3 MMOL/L (ref 137–145)
TOTAL CELLS COUNTED BLD: 100
TOXIC GRANULES BLD QL SMEAR: (no result)
VARIANT LYMPHS NFR BLD MANUAL: 6 % (ref 13–45)
WBC # BLD AUTO: 22.8 10^3/UL (ref 4–10.5)
WBC # BLD AUTO: 26.4 10^3/UL (ref 4–10.5)

## 2019-02-15 PROCEDURE — 5A1D70Z PERFORMANCE OF URINARY FILTRATION, INTERMITTENT, LESS THAN 6 HOURS PER DAY: ICD-10-PCS | Performed by: SURGERY

## 2019-02-15 RX ADMIN — DIPHENHYDRAMINE HYDROCHLORIDE SCH MG: 25 CAPSULE ORAL at 21:09

## 2019-02-15 RX ADMIN — IPRATROPIUM BROMIDE AND ALBUTEROL SULFATE SCH ML: 2.5; .5 SOLUTION RESPIRATORY (INHALATION) at 02:35

## 2019-02-15 RX ADMIN — IPRATROPIUM BROMIDE AND ALBUTEROL SULFATE SCH ML: 2.5; .5 SOLUTION RESPIRATORY (INHALATION) at 19:24

## 2019-02-15 RX ADMIN — CEFTRIAXONE SCH MLS/HR: 2 INJECTION, SOLUTION INTRAVENOUS at 11:22

## 2019-02-15 RX ADMIN — METRONIDAZOLE SCH MLS/HR: 500 INJECTION, SOLUTION INTRAVENOUS at 11:22

## 2019-02-15 RX ADMIN — HYDROCORTISONE SODIUM SUCCINATE SCH MG: 100 INJECTION, POWDER, FOR SOLUTION INTRAMUSCULAR; INTRAVENOUS at 17:46

## 2019-02-15 RX ADMIN — METRONIDAZOLE SCH MLS/HR: 500 INJECTION, SOLUTION INTRAVENOUS at 01:05

## 2019-02-15 RX ADMIN — Medication SCH: at 05:35

## 2019-02-15 RX ADMIN — Medication SCH UNIT: at 15:16

## 2019-02-15 RX ADMIN — Medication SCH MG: at 11:21

## 2019-02-15 RX ADMIN — ALBUMIN (HUMAN) SCH MLS/HR: 12.5 SOLUTION INTRAVENOUS at 08:00

## 2019-02-15 RX ADMIN — METRONIDAZOLE SCH MLS/HR: 500 INJECTION, SOLUTION INTRAVENOUS at 23:55

## 2019-02-15 RX ADMIN — LEUCINE, PHENYLALANINE, LYSINE, METHIONINE, ISOLEUCINE, VALINE, HISTIDINE, THREONINE, TRYPTOPHAN, ALANINE, GLYCINE, ARGININE, PROLINE, SERINE, TYROSINE, DEXTROSE PRN MLS/HR: 365; 280; 290; 200; 300; 290; 240; 210; 90; 1035; 515; 575; 340; 250; 20; 25 INJECTION INTRAVENOUS at 16:30

## 2019-02-15 RX ADMIN — ENOXAPARIN SODIUM SCH: 40 INJECTION SUBCUTANEOUS at 12:21

## 2019-02-15 RX ADMIN — Medication SCH UNIT: at 21:09

## 2019-02-15 RX ADMIN — INSULIN HUMAN PRN UNIT: 100 INJECTION, SOLUTION PARENTERAL at 05:34

## 2019-02-15 RX ADMIN — Medication SCH MG: at 17:46

## 2019-02-15 RX ADMIN — INSULIN HUMAN PRN UNIT: 100 INJECTION, SOLUTION PARENTERAL at 02:23

## 2019-02-15 RX ADMIN — METRONIDAZOLE SCH MLS/HR: 500 INJECTION, SOLUTION INTRAVENOUS at 05:32

## 2019-02-15 RX ADMIN — IPRATROPIUM BROMIDE AND ALBUTEROL SULFATE SCH ML: 2.5; .5 SOLUTION RESPIRATORY (INHALATION) at 08:33

## 2019-02-15 RX ADMIN — Medication SCH: at 15:16

## 2019-02-15 RX ADMIN — FLUOXETINE SCH MG: 20 CAPSULE ORAL at 11:20

## 2019-02-15 RX ADMIN — ALBUMIN (HUMAN) SCH MLS/HR: 12.5 SOLUTION INTRAVENOUS at 05:34

## 2019-02-15 RX ADMIN — GABAPENTIN SCH MG: 300 CAPSULE ORAL at 11:20

## 2019-02-15 RX ADMIN — IPRATROPIUM BROMIDE AND ALBUTEROL SULFATE SCH ML: 2.5; .5 SOLUTION RESPIRATORY (INHALATION) at 14:26

## 2019-02-15 RX ADMIN — GABAPENTIN SCH MG: 300 CAPSULE ORAL at 21:09

## 2019-02-15 RX ADMIN — Medication SCH: at 21:10

## 2019-02-15 RX ADMIN — INSULIN HUMAN PRN UNIT: 100 INJECTION, SOLUTION PARENTERAL at 18:00

## 2019-02-15 RX ADMIN — HYDROCORTISONE SODIUM SUCCINATE SCH MG: 100 INJECTION, POWDER, FOR SOLUTION INTRAMUSCULAR; INTRAVENOUS at 01:19

## 2019-02-15 RX ADMIN — METRONIDAZOLE SCH MLS/HR: 500 INJECTION, SOLUTION INTRAVENOUS at 17:45

## 2019-02-15 RX ADMIN — HYDROCORTISONE SODIUM SUCCINATE SCH MG: 100 INJECTION, POWDER, FOR SOLUTION INTRAMUSCULAR; INTRAVENOUS at 12:25

## 2019-02-15 NOTE — PDOC PROGRESS REPORT
Subjective


Progress Note for:: 02/15/19


Subjective:: 





Patient intubated, sedated; off pressors; on hemodialysis; minimal vent support


Reason For Visit: 


DIVERTICULITIS,SIGMOID AND DESCENDING COLON,INTRA-








Physical Exam


Vital Signs: 


                                        











Temp Pulse Resp BP Pulse Ox


 


 97.5 F   112 H  12   107/70   100 


 


 02/15/19 08:00  02/15/19 10:00  02/15/19 10:00  02/15/19 10:00  02/15/19 10:00








                                 Intake & Output











 02/14/19 02/15/19 02/16/19





 06:59 06:59 06:59


 


Intake Total 1431 1537 100


 


Output Total 1468 928 10


 


Balance -37 609 90


 


Weight 93 kg 93.4 kg 











General appearance: PRESENT: other - Support tubes and lines in place.


Head exam: PRESENT: other - Facial edema settling


Respiratory exam: PRESENT: other - Occasional rhonchi bilaterally; new right 

subclavian line in position


GI/Abdominal exam: PRESENT: other - Soft, almost concave.  Ostomy putting out 

copious amounts of small bowel contents.  All drains with serous sanguinous 

fluid





Results


Laboratory Results: 


                                        





                                 02/15/19 04:28 





                                 02/15/19 04:28 





                                        











  02/14/19 02/14/19 02/15/19





  12:20 15:03 04:28


 


WBC   


 


RBC   


 


Hgb   


 


Hct   


 


MCV   


 


MCH   


 


MCHC   


 


RDW   


 


Plt Count   


 


Seg Neutrophils %   


 


Lymphocytes %   


 


Monocytes %   


 


Eosinophils %   


 


Basophils %   


 


Absolute Neutrophils   


 


Absolute Lymphocytes   


 


Absolute Monocytes   


 


Absolute Eosinophils   


 


Absolute Basophils   


 


Carbonic Acid    1.09


 


HCO3/H2CO3 Ratio    20:1


 


ABG pH    7.41


 


ABG pCO2    36.2


 


ABG pO2    116.2 H


 


ABG HCO3    22.5


 


ABG O2 Saturation    98.3 H


 


ABG Base Excess    -1.9


 


FiO2    30%


 


Sodium  138.6  


 


Potassium  3.7  


 


Chloride  105  


 


Carbon Dioxide  24  


 


Anion Gap  10  


 


BUN  26 H  


 


Creatinine  1.25  


 


Est GFR ( Amer)  53 L  


 


Est GFR (Non-Af Amer)  44 L  


 


Glucose  196 H  


 


Calcium  7.3 L  


 


Phosphorus   


 


Magnesium   


 


Total Bilirubin   


 


AST   


 


ALT   


 


Alkaline Phosphatase   


 


Total Protein   


 


Albumin   


 


Prealbumin   


 


Urine Color   YELLOW 


 


Urine Appearance   CLOUDY 


 


Urine pH   5.0 


 


Ur Specific Gravity   1.015 


 


Urine Protein   100 H 


 


Urine Glucose (UA)   NEGATIVE 


 


Urine Ketones   NEGATIVE 


 


Urine Blood   LARGE H 


 


Urine Nitrite   NEGATIVE 


 


Ur Leukocyte Esterase   TRACE H 


 


Urine RBC (Auto)   24 














  02/15/19 02/15/19





  04:28 04:28


 


WBC  22.8 H 


 


RBC  2.23 L 


 


Hgb  7.0 L 


 


Hct  20.3 L 


 


MCV  91 


 


MCH  31.2 


 


MCHC  34.3 


 


RDW  16.6 H 


 


Plt Count  35 L 


 


Seg Neutrophils %  Not Reportable 


 


Lymphocytes %  Not Reportable 


 


Monocytes %  Not Reportable 


 


Eosinophils %  Not Reportable 


 


Basophils %  Not Reportable 


 


Absolute Neutrophils  Not Reportable 


 


Absolute Lymphocytes  Not Reportable 


 


Absolute Monocytes  Not Reportable 


 


Absolute Eosinophils  Not Reportable 


 


Absolute Basophils  Not Reportable 


 


Carbonic Acid  


 


HCO3/H2CO3 Ratio  


 


ABG pH  


 


ABG pCO2  


 


ABG pO2  


 


ABG HCO3  


 


ABG O2 Saturation  


 


ABG Base Excess  


 


FiO2  


 


Sodium   138.3


 


Potassium   3.8


 


Chloride   106


 


Carbon Dioxide   23


 


Anion Gap   9


 


BUN   33 H


 


Creatinine   1.44 H


 


Est GFR ( Amer)   45 L


 


Est GFR (Non-Af Amer)   37 L


 


Glucose   190 H


 


Calcium   7.3 L


 


Phosphorus   2.1 L


 


Magnesium   1.9


 


Total Bilirubin   3.2 H


 


AST   159 H


 


ALT   793 H


 


Alkaline Phosphatase   178 H


 


Total Protein   3.3 L


 


Albumin   1.9 L


 


Prealbumin   5.7 L


 


Urine Color  


 


Urine Appearance  


 


Urine pH  


 


Ur Specific Gravity  


 


Urine Protein  


 


Urine Glucose (UA)  


 


Urine Ketones  


 


Urine Blood  


 


Urine Nitrite  


 


Ur Leukocyte Esterase  


 


Urine RBC (Auto)  








                                        





02/11/19 18:30   Blood   Blood Culture - Final


                            Clostridium Sp.not Perfringens





                                        











  02/13/19 02/13/19





  06:04 06:04


 


Creatine Kinase  136 H 


 


CK-MB (CK-2)   3.43


 


Troponin I   0.033











Impressions: 


                                        





Abdomen/Pelvis CT  02/06/19 00:00


IMPRESSION:  Diverticulitis of the descending colon and sigmoid colon.  No 

definitive abscess or perforation.


Pigtail catheter in the lower abdomen.


 








Abdomen Ultrasound  02/07/19 00:00


IMPRESSION:


 


No acute sonographic abnormality.


 








Catheter Patency X-Ray  02/08/19 00:00


IMPRESSION:  SMALL RESIDUAL ABSCESS CAVITY.  FISTULOUS COMMUNICATION WITH THE 

DISTAL DESCENDING COLON WITH COLONIC FINDINGS AS DESCRIBED ABOVE.


 








Fluoroscopy  02/08/19 00:00


IMPRESSION:  SMALL RESIDUAL ABSCESS CAVITY.  FISTULOUS COMMUNICATION WITH THE 

DISTAL DESCENDING COLON WITH COLONIC FINDINGS AS DESCRIBED ABOVE.


 








Chest X-Ray  02/14/19 00:00


IMPRESSION:  Satisfactory position of support apparatus.  No pneumothorax.


 














Assessment & Plan





- Diagnosis


(1) Sepsis


Is this a current diagnosis for this admission?: Yes   


Plan: 


Impression: Patient is now 4 days post exploratory laparotomy, total colectomy, 

permanent ileostomy, Kavya's procedure, intraperitoneal drain placement with 

postoperative sepsis stabilizing; hemodynamically maintaining her own stability 

without pressors; now getting dialyzed for acute kidney injury; stable respir

atory and GI function.





Recommendations:





1.  Patient growing Clostridium,species not specified; spoke with microbiology; 

unable to get a specific  [facility]  readout; suspect Clostridium sensitive to current

antibiotics;





2.  Continue tube feeds, TPN





3.  Patient has satisfactory central line right subclavian position and 

trialysis catheter in left groin working satisfactorily;





4.  Continue Wound VAC in place





5. Moniter neuro  status





6. Spoke with family








(2) Ulcerative colitis


Qualifiers: 


   Ulcerative colitis location: unspecified ulcerative colitis location   

Digestive disease complication type: with rectal bleeding   Qualified Code(s): 

K51.911 - Ulcerative colitis, unspecified with rectal bleeding   


Is this a current diagnosis for this admission?: Yes   





(3) Acute kidney failure with tubular necrosis


Is this a current diagnosis for this admission?: Yes   





(4) Sepsis associated hypotension


Is this a current diagnosis for this admission?: Yes   





(5) Abscess of abdominal cavity


Is this a current diagnosis for this admission?: Yes   





(6) Hypoalbuminemia


Is this a current diagnosis for this admission?: Yes

## 2019-02-15 NOTE — PDOC PROGRESS REPORT
Subjective


Progress Note for:: 02/15/19


Subjective:: 





I saw the patient during initiation of dialysis early morning today.  Patient 

remains to be intubated and sedated without any vasopressors.  Son Elvis was at 

bedside and  is agreeable with the plan of hemodialysis today.  Patient is at 

least hemodynamically stable.


Reason For Visit: 


DIVERTICULITIS,SIGMOID AND DESCENDING COLON,INTRA-








Physical Exam


Vital Signs: 


                                        











Temp Pulse Resp BP Pulse Ox


 


 98.1 F   102 H  11 L  157/98 H  99 


 


 02/15/19 17:24  02/15/19 16:27  02/15/19 17:24  02/15/19 17:24  02/15/19 17:24








                                 Intake & Output











 02/14/19 02/15/19 02/16/19





 06:59 06:59 06:59


 


Intake Total 1431 1587 1570


 


Output Total 0816 928 5883


 


Balance -37 659 -1525


 


Weight 93 kg 93.4 kg 








Vitals during dialysis: Blood pressure 142/80, heart rate of 101, respiratory r

ate of 15, temperature of 36.4, blood flow rate of 250 mL/min and dialysate flow

rate of 500 mL/min.


Exam: 





General appearance: PRESENT: Intubated and sedated


Head exam: PRESENT: atraumatic, normocephalic; face is swollen with ET tube in 

place


Eye exam: PRESENT: Eyes are closed


Neck exam: ABSENT: JVD


Respiratory exam: PRESENT: Diminished breath sounds.  ABSENT: crackles, rales, 

rhonchi, unlabored, wheezes


Cardiovascular exam: PRESENT: Regular rate rhythm -+S1, +S2.  ABSENT: diastolic 

murmur, systolic murmur


GI/Abdominal exam: PRESENT: Very hypoactive bowel sounds, soft.  Right lower 

quadrant ileostomy with 2 DAY drains ABSENT: guarding, mass, tenderness


Extremities exam: Grade 3 bilateral lower extremity edema, upper extremity edema

and overall anasarca


Neurological exam: Sedated.


Skin exam: PRESENT: dry, warm,





Results


Laboratory Results: 


                                        





                                 02/15/19 04:28 





                                 02/15/19 04:28 





                                        











  02/15/19 02/15/19 02/15/19





  04:28 04:28 04:28


 


WBC   22.8 H 


 


RBC   2.23 L 


 


Hgb   7.0 L 


 


Hct   20.3 L 


 


MCV   91 


 


MCH   31.2 


 


MCHC   34.3 


 


RDW   16.6 H 


 


Plt Count   35 L 


 


Seg Neutrophils %   Not Reportable 


 


Lymphocytes %   Not Reportable 


 


Monocytes %   Not Reportable 


 


Eosinophils %   Not Reportable 


 


Basophils %   Not Reportable 


 


Absolute Neutrophils   Not Reportable 


 


Absolute Lymphocytes   Not Reportable 


 


Absolute Monocytes   Not Reportable 


 


Absolute Eosinophils   Not Reportable 


 


Absolute Basophils   Not Reportable 


 


Carbonic Acid  1.09  


 


HCO3/H2CO3 Ratio  20:1  


 


ABG pH  7.41  


 


ABG pCO2  36.2  


 


ABG pO2  116.2 H  


 


ABG HCO3  22.5  


 


ABG O2 Saturation  98.3 H  


 


ABG Base Excess  -1.9  


 


FiO2  30%  


 


Sodium    138.3


 


Potassium    3.8


 


Chloride    106


 


Carbon Dioxide    23


 


Anion Gap    9


 


BUN    33 H


 


Creatinine    1.44 H


 


Est GFR ( Amer)    45 L


 


Est GFR (Non-Af Amer)    37 L


 


Glucose    190 H


 


Calcium    7.3 L


 


Phosphorus    2.1 L


 


Magnesium    1.9


 


Total Bilirubin    3.2 H


 


AST    159 H


 


ALT    793 H


 


Alkaline Phosphatase    178 H


 


Total Protein    3.3 L


 


Albumin    1.9 L


 


Prealbumin    5.7 L


 


Blood Type   


 


Antibody Screen   














  02/15/19





  10:33


 


WBC 


 


RBC 


 


Hgb 


 


Hct 


 


MCV 


 


MCH 


 


MCHC 


 


RDW 


 


Plt Count 


 


Seg Neutrophils % 


 


Lymphocytes % 


 


Monocytes % 


 


Eosinophils % 


 


Basophils % 


 


Absolute Neutrophils 


 


Absolute Lymphocytes 


 


Absolute Monocytes 


 


Absolute Eosinophils 


 


Absolute Basophils 


 


Carbonic Acid 


 


HCO3/H2CO3 Ratio 


 


ABG pH 


 


ABG pCO2 


 


ABG pO2 


 


ABG HCO3 


 


ABG O2 Saturation 


 


ABG Base Excess 


 


FiO2 


 


Sodium 


 


Potassium 


 


Chloride 


 


Carbon Dioxide 


 


Anion Gap 


 


BUN 


 


Creatinine 


 


Est GFR ( Amer) 


 


Est GFR (Non-Af Amer) 


 


Glucose 


 


Calcium 


 


Phosphorus 


 


Magnesium 


 


Total Bilirubin 


 


AST 


 


ALT 


 


Alkaline Phosphatase 


 


Total Protein 


 


Albumin 


 


Prealbumin 


 


Blood Type  O POSITIVE


 


Antibody Screen  NEGATIVE








                                        





02/11/19 18:30   Blood   Blood Culture - Final


                            Clostridium Sp.not Perfringens





                                        











  02/13/19 02/13/19





  06:04 06:04


 


Creatine Kinase  136 H 


 


CK-MB (CK-2)   3.43


 


Troponin I   0.033











Impressions: 


                                        





Abdomen/Pelvis CT  02/06/19 00:00


IMPRESSION:  Diverticulitis of the descending colon and sigmoid colon.  No 

definitive abscess or perforation.


Pigtail catheter in the lower abdomen.


 








Abdomen Ultrasound  02/07/19 00:00


IMPRESSION:


 


No acute sonographic abnormality.


 








Catheter Patency X-Ray  02/08/19 00:00


IMPRESSION:  SMALL RESIDUAL ABSCESS CAVITY.  FISTULOUS COMMUNICATION WITH THE 

DISTAL DESCENDING COLON WITH COLONIC FINDINGS AS DESCRIBED ABOVE.


 








Fluoroscopy  02/08/19 00:00


IMPRESSION:  SMALL RESIDUAL ABSCESS CAVITY.  FISTULOUS COMMUNICATION WITH THE 

DISTAL DESCENDING COLON WITH COLONIC FINDINGS AS DESCRIBED ABOVE.


 








Chest X-Ray  02/14/19 00:00


IMPRESSION:  Satisfactory position of support apparatus.  No pneumothorax.


 














Assessment & Plan





- Diagnosis


(1) Acute kidney failure with tubular necrosis


Is this a current diagnosis for this admission?: Yes   


Plan: 


Patient remains to be anuric with urine output from yesterday of only 48 mL 

despite a large dose of Lasix with albumin.


We did dialysis today for 3 hours, using the patient's left femoral trialysis 

catheter, with 3 potassium bath, blood flow rate of 250 mL per minute, dialysate

flow rate of 500 mL per minute, ultrafiltration 2-3 L as tolerated, no heparin 

and no Procrit.  Patient's ultrafiltration at the end of treatment was 2900 mL. 

Patient tolerated the procedure without any need for vasopressors.  I did give 

him 25 g of albumin prior to hemodialysis treatment.


Patient will most likely need further hemodialysis treatment if the family would

like to continue being aggressive.  Monitor kidney function over the weekend.  

Plan to do dialysis on Monday.








(2) Septic shock


Is this a current diagnosis for this admission?: Yes   


Plan: 


Currently off pressors and hemodynamically stable.








(3) Anasarca


Is this a current diagnosis for this admission?: Yes   





(4) Hypoalbuminemia


Is this a current diagnosis for this admission?: Yes   





(5) Hypophosphatemia


Is this a current diagnosis for this admission?: Yes   


Plan: 


Replace phosphorus.








(6) Malnutrition


Is this a current diagnosis for this admission?: Yes   


Plan: 


Continue TPN








(7) Colonic fistula


Is this a current diagnosis for this admission?: Yes   





(8) Diverticulitis large intestine


Qualifiers: 


   Diverticulitis bleeding: without bleeding   Diverticulitis complication: with

perforation and abscess   Qualified Code(s): K57.20 - Diverticulitis of large 

intestine with perforation and abscess without bleeding   


Is this a current diagnosis for this admission?: Yes   





(9) Abscess of abdominal cavity


Is this a current diagnosis for this admission?: Yes   





(10) Anemia


Qualifiers: 


   Anemia type: other cause 


Is this a current diagnosis for this admission?: Yes   


Plan: 


Patient to receive 2 units of packed RBC today.








- Time


Time with patient: 15-25 minutes

## 2019-02-15 NOTE — PDOC PROGRESS REPORT
Subjective


Progress Note for:: 02/15/19


Subjective:: 





Patient remains sedated and intubated.  Hemodialysis was started this morning.  

They got about 2900 mL's off, which was all the dialysis nurse thought she was 

going to be able to tolerate at that time.  Still no urine output to speak of.  

Hemodynamically, her blood pressures have been good off of pressors and her 

vital signs have all been stable.


Reason For Visit: 


DIVERTICULITIS,SIGMOID AND DESCENDING COLON,INTRA-








Physical Exam


Vital Signs: 


                                        











Temp Pulse Resp BP Pulse Ox


 


 98.1 F   102 H  11 L  157/98 H  99 


 


 02/15/19 17:24  02/15/19 16:27  02/15/19 17:24  02/15/19 17:24  02/15/19 17:24








                                 Intake & Output











 02/14/19 02/15/19 02/16/19





 06:59 06:59 06:59


 


Intake Total 1431 1587 1570


 


Output Total 1250 832 7674


 


Balance -37 659 -1525


 


Weight 93 kg 93.4 kg 








General appearance: PRESENT: No acute distress, other - Sedated, intubated


Respiratory exam: PRESENT: crackles - Bilateral, symmetrical, unlabored.  

ABSENT: prolonged expiratory phase, rhonchi, tachypnea, wheezes


Cardiovascular exam: PRESENT: +S1, +S2, tachycardia


Vascular exam: ABSENT: normal capillary refill - Capillary refill was 6 seconds


GI/Abdominal exam: PRESENT: diminished bowel sounds, distended, soft, other - 

Midline abdominal decision was covered with a bulky dressing, she had 3 DAY 

drains.  ABSENT: rebound, tenderness


Extremities exam: PRESENT: pedal edema, other - She has 3+ or greater 

generalized pitting edema in her upper and lower extremities as well as diffuse 

anasarca.  She has a right IJ central line and a left groin trialysis catheter. 

ABSENT: clubbing


Musculoskeletal exam: ABSENT: deformity, tenderness


Neurological exam: PRESENT: other - Sedated, intubated


Skin exam: PRESENT: jaundice, mottled.  ABSENT: warm





Results


Laboratory Results: 


                                        





                                 02/15/19 04:28 





                                 02/15/19 04:28 





                                        











  02/15/19 02/15/19 02/15/19





  04:28 04:28 04:28


 


WBC   22.8 H 


 


RBC   2.23 L 


 


Hgb   7.0 L 


 


Hct   20.3 L 


 


MCV   91 


 


MCH   31.2 


 


MCHC   34.3 


 


RDW   16.6 H 


 


Plt Count   35 L 


 


Seg Neutrophils %   Not Reportable 


 


Lymphocytes %   Not Reportable 


 


Monocytes %   Not Reportable 


 


Eosinophils %   Not Reportable 


 


Basophils %   Not Reportable 


 


Absolute Neutrophils   Not Reportable 


 


Absolute Lymphocytes   Not Reportable 


 


Absolute Monocytes   Not Reportable 


 


Absolute Eosinophils   Not Reportable 


 


Absolute Basophils   Not Reportable 


 


Carbonic Acid  1.09  


 


HCO3/H2CO3 Ratio  20:1  


 


ABG pH  7.41  


 


ABG pCO2  36.2  


 


ABG pO2  116.2 H  


 


ABG HCO3  22.5  


 


ABG O2 Saturation  98.3 H  


 


ABG Base Excess  -1.9  


 


FiO2  30%  


 


Sodium    138.3


 


Potassium    3.8


 


Chloride    106


 


Carbon Dioxide    23


 


Anion Gap    9


 


BUN    33 H


 


Creatinine    1.44 H


 


Est GFR ( Amer)    45 L


 


Est GFR (Non-Af Amer)    37 L


 


Glucose    190 H


 


Calcium    7.3 L


 


Phosphorus    2.1 L


 


Magnesium    1.9


 


Total Bilirubin    3.2 H


 


AST    159 H


 


ALT    793 H


 


Alkaline Phosphatase    178 H


 


Total Protein    3.3 L


 


Albumin    1.9 L


 


Prealbumin    5.7 L


 


Blood Type   


 


Antibody Screen   














  02/15/19





  10:33


 


WBC 


 


RBC 


 


Hgb 


 


Hct 


 


MCV 


 


MCH 


 


MCHC 


 


RDW 


 


Plt Count 


 


Seg Neutrophils % 


 


Lymphocytes % 


 


Monocytes % 


 


Eosinophils % 


 


Basophils % 


 


Absolute Neutrophils 


 


Absolute Lymphocytes 


 


Absolute Monocytes 


 


Absolute Eosinophils 


 


Absolute Basophils 


 


Carbonic Acid 


 


HCO3/H2CO3 Ratio 


 


ABG pH 


 


ABG pCO2 


 


ABG pO2 


 


ABG HCO3 


 


ABG O2 Saturation 


 


ABG Base Excess 


 


FiO2 


 


Sodium 


 


Potassium 


 


Chloride 


 


Carbon Dioxide 


 


Anion Gap 


 


BUN 


 


Creatinine 


 


Est GFR ( Amer) 


 


Est GFR (Non-Af Amer) 


 


Glucose 


 


Calcium 


 


Phosphorus 


 


Magnesium 


 


Total Bilirubin 


 


AST 


 


ALT 


 


Alkaline Phosphatase 


 


Total Protein 


 


Albumin 


 


Prealbumin 


 


Blood Type  O POSITIVE


 


Antibody Screen  NEGATIVE








                                        





02/11/19 18:30   Blood   Blood Culture - Final


                            Clostridium Sp.not Perfringens





                                        











  02/13/19 02/13/19





  06:04 06:04


 


Creatine Kinase  136 H 


 


CK-MB (CK-2)   3.43


 


Troponin I   0.033











Impressions: 


                                        





Abdomen/Pelvis CT  02/06/19 00:00


IMPRESSION:  Diverticulitis of the descending colon and sigmoid colon.  No 

definitive abscess or perforation.


Pigtail catheter in the lower abdomen.


 








Abdomen Ultrasound  02/07/19 00:00


IMPRESSION:


 


No acute sonographic abnormality.


 








Catheter Patency X-Ray  02/08/19 00:00


IMPRESSION:  SMALL RESIDUAL ABSCESS CAVITY.  FISTULOUS COMMUNICATION WITH THE 

DISTAL DESCENDING COLON WITH COLONIC FINDINGS AS DESCRIBED ABOVE.


 








Fluoroscopy  02/08/19 00:00


IMPRESSION:  SMALL RESIDUAL ABSCESS CAVITY.  FISTULOUS COMMUNICATION WITH THE 

DISTAL DESCENDING COLON WITH COLONIC FINDINGS AS DESCRIBED ABOVE.


 








Chest X-Ray  02/14/19 00:00


IMPRESSION:  Satisfactory position of support apparatus.  No pneumothorax.


 














Assessment & Plan





- Diagnosis


(1) Septic shock


Is this a current diagnosis for this admission?: Yes   


Plan: 


She has evidence of multisystem organ failure, but there are some signs of 

improvement.  Her creatinine has improved and she has had improvement in her 

blood pressure.  She is on broad-spectrum antibiotic coverage for likely 

organisms.  Liver enzymes and bilirubin continue to trend down.  She is off the 

Cardizem drip and her heart rate is holding stable.  She got a couple more units

of blood 2 days ago.  Fluid from the DAY drains was negative for creatinine.  

Felt to be unlikely that she has a ureteral injury that is draining urine into 

the belly.  She is getting dialysis and is being followed by nephrology.








(2) Colonic fistula


Is this a current diagnosis for this admission?: Yes   


Plan: 


Management per surgery








- Time


Time Spent with patient: 25-34 minutes

## 2019-02-16 LAB
ABSOLUTE LYMPHOCYTES# (MANUAL): 2.2 10^3/UL (ref 0.5–4.7)
ABSOLUTE MONOCYTES # (MANUAL): 1.9 10^3/UL (ref 0.1–1.4)
ABSOLUTE NEUTROPHILS# (MANUAL): 23.4 10^3/UL (ref 1.7–8.2)
ADD MANUAL DIFF: YES
ALBUMIN SERPL-MCNC: 2.1 G/DL (ref 3.5–5)
ALP SERPL-CCNC: 266 U/L (ref 38–126)
ALT SERPL-CCNC: 527 U/L (ref 9–52)
ANION GAP SERPL CALC-SCNC: 8 MMOL/L (ref 5–19)
ANISOCYTOSIS BLD QL SMEAR: (no result)
APTT BLD: 32 SEC (ref 23.5–35.8)
ARTERIAL BLOOD FIO2: (no result)
ARTERIAL BLOOD H2CO3: 1.14 MMOL/L (ref 1.05–1.35)
ARTERIAL BLOOD HCO3: 25 MMOL/L (ref 20–24)
ARTERIAL BLOOD PCO2: 38 MMHG (ref 35–45)
ARTERIAL BLOOD PH: 7.44 (ref 7.35–7.45)
ARTERIAL BLOOD PO2: 92.9 MMHG (ref 80–100)
ARTERIAL BLOOD TOTAL CO2: 26.2 MMOL/L (ref 21–25)
AST SERPL-CCNC: 94 U/L (ref 14–36)
BASE EXCESS BLDA CALC-SCNC: 0.9 MMOL/L
BASOPHILS NFR BLD MANUAL: 0 % (ref 0–2)
BILIRUB DIRECT SERPL-MCNC: 2.5 MG/DL (ref 0–0.4)
BILIRUB SERPL-MCNC: 3.4 MG/DL (ref 0.2–1.3)
BUN SERPL-MCNC: 34 MG/DL (ref 7–20)
CALCIUM: 7.4 MG/DL (ref 8.4–10.2)
CHLORIDE SERPL-SCNC: 105 MMOL/L (ref 98–107)
CO2 SERPL-SCNC: 25 MMOL/L (ref 22–30)
D DIMER PPP FEU-MCNC: 3.4 UG/ML (ref 0–0.5)
EOSINOPHIL NFR BLD MANUAL: 0 % (ref 0–6)
ERYTHROCYTE [DISTWIDTH] IN BLOOD BY AUTOMATED COUNT: 16.7 % (ref 11.5–14)
ERYTHROCYTE [DISTWIDTH] IN BLOOD BY AUTOMATED COUNT: 17.3 % (ref 11.5–14)
FIBRINOGEN PPP-MCNC: 209 MG/DL (ref 209–497)
GLUCOSE SERPL-MCNC: 187 MG/DL (ref 75–110)
HCT VFR BLD CALC: 25.1 % (ref 36–47)
HCT VFR BLD CALC: 29.9 % (ref 36–47)
HEPATITIS B SURFACE AB QUANT: 4.2 MIU/ML
HEPATITIS C VIRUS ANTIBODY: <0.1 S/CO RATIO (ref 0–0.9)
HGB BLD-MCNC: 10.3 G/DL (ref 12–15.5)
HGB BLD-MCNC: 8.3 G/DL (ref 12–15.5)
INR PPP: 1.34
MCH RBC QN AUTO: 29.8 PG (ref 27–33.4)
MCH RBC QN AUTO: 30.2 PG (ref 27–33.4)
MCHC RBC AUTO-ENTMCNC: 33.3 G/DL (ref 32–36)
MCHC RBC AUTO-ENTMCNC: 34.3 G/DL (ref 32–36)
MCV RBC AUTO: 88 FL (ref 80–97)
MCV RBC AUTO: 90 FL (ref 80–97)
MONOCYTES % (MANUAL): 7 % (ref 3–13)
PLATELET # BLD: 40 10^3/UL (ref 150–450)
PLATELET # BLD: 45 10^3/UL (ref 150–450)
PLATELET COMMENT: (no result)
POLYCHROMASIA BLD QL SMEAR: (no result)
POTASSIUM SERPL-SCNC: 4 MMOL/L (ref 3.6–5)
PROT SERPL-MCNC: 3.4 G/DL (ref 6.3–8.2)
PROTHROMBIN TIME: 17.3 SEC (ref 11.4–15.4)
RBC # BLD AUTO: 2.8 10^6/UL (ref 3.72–5.28)
RBC # BLD AUTO: 3.4 10^6/UL (ref 3.72–5.28)
SAO2 % BLDA: 97.4 % (ref 94–98)
SEGMENTED NEUTROPHILS % (MAN): 85 % (ref 42–78)
SODIUM SERPL-SCNC: 138.2 MMOL/L (ref 137–145)
TOTAL CELLS COUNTED BLD: 100
VARIANT LYMPHS NFR BLD MANUAL: 8 % (ref 13–45)
WBC # BLD AUTO: 25 10^3/UL (ref 4–10.5)
WBC # BLD AUTO: 27.5 10^3/UL (ref 4–10.5)

## 2019-02-16 RX ADMIN — PIPERACILLIN AND TAZOBACTAM SCH GM: 2; .25 INJECTION, POWDER, LYOPHILIZED, FOR SOLUTION INTRAVENOUS; PARENTERAL at 17:41

## 2019-02-16 RX ADMIN — INSULIN HUMAN PRN UNIT: 100 INJECTION, SOLUTION PARENTERAL at 06:12

## 2019-02-16 RX ADMIN — IPRATROPIUM BROMIDE AND ALBUTEROL SULFATE SCH ML: 2.5; .5 SOLUTION RESPIRATORY (INHALATION) at 20:36

## 2019-02-16 RX ADMIN — MORPHINE SULFATE PRN MG: 10 INJECTION INTRAMUSCULAR; INTRAVENOUS; SUBCUTANEOUS at 05:11

## 2019-02-16 RX ADMIN — INSULIN HUMAN PRN UNIT: 100 INJECTION, SOLUTION PARENTERAL at 00:10

## 2019-02-16 RX ADMIN — METRONIDAZOLE SCH MLS/HR: 500 INJECTION, SOLUTION INTRAVENOUS at 05:08

## 2019-02-16 RX ADMIN — HYDROCORTISONE SODIUM SUCCINATE SCH MG: 100 INJECTION, POWDER, FOR SOLUTION INTRAMUSCULAR; INTRAVENOUS at 12:10

## 2019-02-16 RX ADMIN — PANTOPRAZOLE SODIUM SCH MG: 40 INJECTION, POWDER, FOR SOLUTION INTRAVENOUS at 21:19

## 2019-02-16 RX ADMIN — Medication SCH: at 05:14

## 2019-02-16 RX ADMIN — SUCRALFATE SCH: 1 SUSPENSION ORAL at 13:41

## 2019-02-16 RX ADMIN — IPRATROPIUM BROMIDE AND ALBUTEROL SULFATE SCH ML: 2.5; .5 SOLUTION RESPIRATORY (INHALATION) at 08:19

## 2019-02-16 RX ADMIN — INSULIN HUMAN PRN UNIT: 100 INJECTION, SOLUTION PARENTERAL at 18:30

## 2019-02-16 RX ADMIN — ENOXAPARIN SODIUM SCH: 40 INJECTION SUBCUTANEOUS at 13:27

## 2019-02-16 RX ADMIN — HYDROCORTISONE SODIUM SUCCINATE SCH MG: 100 INJECTION, POWDER, FOR SOLUTION INTRAMUSCULAR; INTRAVENOUS at 17:41

## 2019-02-16 RX ADMIN — SUCRALFATE SCH GM: 1 SUSPENSION ORAL at 17:41

## 2019-02-16 RX ADMIN — METRONIDAZOLE SCH MLS/HR: 500 INJECTION, SOLUTION INTRAVENOUS at 12:08

## 2019-02-16 RX ADMIN — METRONIDAZOLE SCH MLS/HR: 500 INJECTION, SOLUTION INTRAVENOUS at 17:42

## 2019-02-16 RX ADMIN — IPRATROPIUM BROMIDE AND ALBUTEROL SULFATE SCH ML: 2.5; .5 SOLUTION RESPIRATORY (INHALATION) at 14:02

## 2019-02-16 RX ADMIN — Medication SCH UNIT: at 05:10

## 2019-02-16 RX ADMIN — PIPERACILLIN AND TAZOBACTAM SCH GM: 2; .25 INJECTION, POWDER, LYOPHILIZED, FOR SOLUTION INTRAVENOUS; PARENTERAL at 12:09

## 2019-02-16 RX ADMIN — IPRATROPIUM BROMIDE AND ALBUTEROL SULFATE SCH ML: 2.5; .5 SOLUTION RESPIRATORY (INHALATION) at 02:23

## 2019-02-16 RX ADMIN — HYDROCORTISONE SODIUM SUCCINATE SCH MG: 100 INJECTION, POWDER, FOR SOLUTION INTRAMUSCULAR; INTRAVENOUS at 01:07

## 2019-02-16 RX ADMIN — PIPERACILLIN AND TAZOBACTAM SCH: 2; .25 INJECTION, POWDER, LYOPHILIZED, FOR SOLUTION INTRAVENOUS; PARENTERAL at 13:41

## 2019-02-16 RX ADMIN — Medication SCH: at 17:34

## 2019-02-16 RX ADMIN — Medication SCH UNIT: at 21:18

## 2019-02-16 RX ADMIN — SUCRALFATE SCH GM: 1 SUSPENSION ORAL at 12:10

## 2019-02-16 RX ADMIN — LEUCINE, PHENYLALANINE, LYSINE, METHIONINE, ISOLEUCINE, VALINE, HISTIDINE, THREONINE, TRYPTOPHAN, ALANINE, GLYCINE, ARGININE, PROLINE, SERINE, TYROSINE, DEXTROSE PRN MLS/HR: 365; 280; 290; 200; 300; 290; 240; 210; 90; 1035; 515; 575; 340; 250; 20; 25 INJECTION INTRAVENOUS at 13:33

## 2019-02-16 RX ADMIN — PANTOPRAZOLE SODIUM SCH MG: 40 INJECTION, POWDER, FOR SOLUTION INTRAVENOUS at 12:09

## 2019-02-16 NOTE — PDOC PROGRESS REPORT
Subjective


Progress Note for:: 02/16/19


Subjective:: 





Intubated, sedated, not responsive


Reason For Visit: 


DIVERTICULITIS,SIGMOID AND DESCENDING COLON,INTRA-








Physical Exam


Vital Signs: 


                                        











Temp Pulse Resp BP Pulse Ox


 


 97.0 F   95   18   163/102 H  100 


 


 02/16/19 08:01  02/16/19 08:19  02/16/19 08:19  02/16/19 08:00  02/16/19 08:19








                                 Intake & Output











 02/15/19 02/16/19 02/17/19





 06:59 06:59 06:59


 


Intake Total 1587 2587 100


 


Output Total 928 3882 200


 


Balance 659 -1295 -100


 


Weight 93.4 kg 91.5 kg 











General appearance: PRESENT: obese


Respiratory exam: PRESENT: clear to auscultation umu


Cardiovascular exam: PRESENT: RRR


GI/Abdominal exam: PRESENT: soft, other - 1) no bowel sounds 2) End ilestomy 

presents with mucosal ishemia, but viable inside; digested and fresh blood noted

from ostomy lumen, finger exam shows a patent ostomy; 3) Drains with serous 

fluid


Skin exam: PRESENT: other - abdominal wound:  slightly dusky but granulating, 

fascia intact, no odor, stool contamitated





Results


Laboratory Results: 


                                        





                                 02/16/19 04:40 





                                 02/16/19 04:40 





                                        











  02/15/19 02/15/19 02/16/19





  10:33 21:25 04:40


 


WBC   26.4 H 


 


RBC   3.40 L 


 


Hgb   10.3 L D 


 


Hct   29.9 L 


 


MCV   88 


 


MCH   30.3 


 


MCHC   34.4 


 


RDW   16.6 H 


 


Plt Count   38 L 


 


Seg Neutrophils %   


 


Lymphocytes %   


 


Monocytes %   


 


Eosinophils %   


 


Basophils %   


 


Absolute Neutrophils   


 


Absolute Lymphocytes   


 


Absolute Monocytes   


 


Absolute Eosinophils   


 


Absolute Basophils   


 


Carbonic Acid    1.14


 


HCO3/H2CO3 Ratio    21:1


 


ABG pH    7.44


 


ABG pCO2    38.0


 


ABG pO2    92.9


 


ABG HCO3    25.0 H


 


ABG O2 Saturation    97.4


 


ABG Base Excess    0.9


 


FiO2    30%


 


Sodium   


 


Potassium   


 


Chloride   


 


Carbon Dioxide   


 


Anion Gap   


 


BUN   


 


Creatinine   


 


Est GFR ( Amer)   


 


Est GFR (Non-Af Amer)   


 


Glucose   


 


Calcium   


 


Magnesium   


 


Total Bilirubin   


 


AST   


 


ALT   


 


Alkaline Phosphatase   


 


Total Protein   


 


Albumin   


 


Blood Type  O POSITIVE  


 


Antibody Screen  NEGATIVE  














  02/16/19 02/16/19





  04:40 04:40


 


WBC  27.5 H 


 


RBC  3.40 L 


 


Hgb  10.3 L 


 


Hct  29.9 L 


 


MCV  88 


 


MCH  30.2 


 


MCHC  34.3 


 


RDW  16.7 H 


 


Plt Count  40 L 


 


Seg Neutrophils %  Not Reportable 


 


Lymphocytes %  Not Reportable 


 


Monocytes %  Not Reportable 


 


Eosinophils %  Not Reportable 


 


Basophils %  Not Reportable 


 


Absolute Neutrophils  Not Reportable 


 


Absolute Lymphocytes  Not Reportable 


 


Absolute Monocytes  Not Reportable 


 


Absolute Eosinophils  Not Reportable 


 


Absolute Basophils  Not Reportable 


 


Carbonic Acid  


 


HCO3/H2CO3 Ratio  


 


ABG pH  


 


ABG pCO2  


 


ABG pO2  


 


ABG HCO3  


 


ABG O2 Saturation  


 


ABG Base Excess  


 


FiO2  


 


Sodium   138.2


 


Potassium   4.0


 


Chloride   105


 


Carbon Dioxide   25


 


Anion Gap   8


 


BUN   34 H


 


Creatinine   1.25


 


Est GFR ( Amer)   53 L


 


Est GFR (Non-Af Amer)   44 L


 


Glucose   187 H


 


Calcium   7.4 L


 


Magnesium   1.9


 


Total Bilirubin   3.4 H


 


AST   94 H


 


ALT   527 H


 


Alkaline Phosphatase   266 H


 


Total Protein   3.4 L


 


Albumin   2.1 L


 


Blood Type  


 


Antibody Screen  








                                        











  02/13/19 02/13/19





  06:04 06:04


 


Creatine Kinase  136 H 


 


CK-MB (CK-2)   3.43


 


Troponin I   0.033











Impressions: 


                                        





Abdomen/Pelvis CT  02/06/19 00:00


IMPRESSION:  Diverticulitis of the descending colon and sigmoid colon.  No 

definitive abscess or perforation.


Pigtail catheter in the lower abdomen.


 








Abdomen Ultrasound  02/07/19 00:00


IMPRESSION:


 


No acute sonographic abnormality.


 








Catheter Patency X-Ray  02/08/19 00:00


IMPRESSION:  SMALL RESIDUAL ABSCESS CAVITY.  FISTULOUS COMMUNICATION WITH THE 

DISTAL DESCENDING COLON WITH COLONIC FINDINGS AS DESCRIBED ABOVE.


 








Fluoroscopy  02/08/19 00:00


IMPRESSION:  SMALL RESIDUAL ABSCESS CAVITY.  FISTULOUS COMMUNICATION WITH THE 

DISTAL DESCENDING COLON WITH COLONIC FINDINGS AS DESCRIBED ABOVE.


 








Chest X-Ray  02/14/19 00:00


IMPRESSION:  Satisfactory position of support apparatus.  No pneumothorax.


 














Assessment & Plan





- Diagnosis


(1) Colonic fistula


Is this a current diagnosis for this admission?: Yes   





(2) Diverticulitis large intestine


Qualifiers: 


   Diverticulitis bleeding: without bleeding   Diverticulitis complication: with

perforation and abscess   Qualified Code(s): K57.20 - Diverticulitis of large 

intestine with perforation and abscess without bleeding   


Is this a current diagnosis for this admission?: Yes   





(3) Abscess of abdominal cavity


Is this a current diagnosis for this admission?: Yes   





- Plan Summary


Plan Summary: 





A/





POD #5 after lapatoromy, total colectomy and end ileostomy


Respiratory:  on ventilator, tolerated


CV:  stable, off pressors


GI:  stools mixed with blood from ileostomy bag; ostomy has some external 

mucosal ischemia, but is viable on exam; blood loss probably caused by a 

combination of thrombocytopenia and small bowel mucosal ischemia from the 

prolonged use of pressors


: still elevated BUN with improved creatinine after dialysis; anuric


FEN:on tube feeding via NGT, not well tolerated despite low rate (10 mL/hr) 


Skin:  abdominal wound somewhat granulating, no odor; right groin a. line site 

ischemic


Blood still leukocytosis (27k)








P/





remove abdominal drain #3 with lowest output (135 mL/24 hrs)


Remove A. Line 


Stop Rocephin and start Zosyn 2.25 gr q6 to improve gram negative coverage


Continue Flagyl


Hold off TF, resume in 4 hrs at lower rate (5 mL/hr)


Panculture today


Continue TPN


Start Protonix 40 mg IV q12


Carafate 1 gr qid via NGT

## 2019-02-16 NOTE — RADIOLOGY REPORT (SQ)
EXAM DESCRIPTION:  CHEST SINGLE VIEW



COMPLETED DATE/TIME:  2/16/2019 4:55 pm



REASON FOR STUDY:  Bilateral lower Lobe Collapse, RT LAT DECUB, ETT.NGT,CL, MIKE, TRIALYSIS C



COMPARISON:  CT chest 2/16/2016.  Chest x-ray 2/14/2019.



EXAM PARAMETERS:  NUMBER OF VIEWS: One view.

TECHNIQUE: Single decubitus radiographic view of the chest acquired.

RADIATION DOSE: NA

LIMITATIONS: None.



FINDINGS:  LUNGS AND PLEURA: There are small bilateral pleural effusions.  There is consolidation/ate
lectasis at the left lower lobe.  There is diffuse airspace opacity throughout the right lung.  No ob
vious pneumothorax.

MEDIASTINUM AND HILAR STRUCTURES: Not well evaluated.

HEART AND VASCULAR STRUCTURES: The heart does not appear enlarged.  There is mild vascular congestion
.

BONES: No acute findings.

HARDWARE: An endotracheal tube and an enteric tube are partially visualized, not well evaluated on th
is exam.



IMPRESSION:  Small bilateral pleural effusions.  Consolidation/ atelectasis at the left lower lobe.  
Diffuse airspace opacity throughout the right lung, may be secondary to pulmonary edema and/or pneumo
guillermina.  Mild vascular congestion.



TECHNICAL DOCUMENTATION:  JOB ID:  [id number]

[id number]

 2011 [employer]- All Rights Reserved



Reading location - IP/workstation name: [email or web address]

## 2019-02-16 NOTE — RADIOLOGY REPORT (SQ)
EXAM DESCRIPTION:  CT CHEST WITHOUT; CT ABD/PELVIS ORAL ONLY



COMPLETED DATE/TIME:  2/16/2019 2:51 pm; 2/16/2019 2:50 pm



REASON FOR STUDY:  Abcess, Bowel Ischemia



COMPARISON:  CT abdomen pelvis 2/6/2019, 1/21/2019



TECHNIQUE:  CT scan of the chest performed without intravenous contrast using helical scanning techni
que.  Images reviewed with lung, soft tissue and bone windows. Reconstructed coronal and sagittal MPR
 images reviewed.  All images stored on PACS.

CT scan of the abdomen and pelvis performed without intravenous contrast and withoral contrast using 
helical scanning technique with dynamic intravenous contrast injection.  Images reviewed with lung, s
oft tissue and bone windows.  Reconstructed coronal and sagittal MPR images reviewed.  All images sto
red on PACS.

All CT scanners at this facility use dose modulation, iterative reconstruction, and/or weight based d
osing when appropriate to reduce radiation dose to as low as reasonably achievable (ALARA).

[facility]: Dose Right  [facility]: CareDose    [facility]: Dose Right    [facility]: Teradose 4D    [facility]: Smart Technologies



RADIATION DOSE:  CT Rad equipment meets quality standard of care and radiation dose reduction techniq
ues were employed. CTDIvol: 15.7 mGy. DLP: 1079 mGy-cm. mGy.



LIMITATIONS:  No technical limitations.



FINDINGS:  CHEST:

AXILLAE: No adenopathy.

CHEST WALL: No masses.  No subcutaneous air.

LUNGS AND PLEURA: Bilateral lower lobe collapse/consolidation likely atelectasis.  Pneumonia could no
t be excluded.  Small bilateral pleural effusions layer dependently in the right and left chest.  No 
pneumothorax.

THYROID: No masses or significant asymmetry.

HILAR AND MEDIASTINAL STRUCTURES: No identified masses or abnormal nodes.

AORTA AND GREAT VESSELS: No aneurysm.

HEART: No pericardial effusion.

HARDWARE AND LIFELINES: Endotracheal tube, nasogastric tube, right subclavian central line in good po
sitioning

BONES: No significant finding.

OTHER: No other significant finding.

ABDOMEN AND PELVIS:

LIVER: Normal size. No masses.  No dilated ducts.

SPLEEN: Normal size.  No focal lesions.

PANCREAS: No masses.  No significant calcifications.  No adjacent inflammation or peripancreatic flui
d collections.  Pancreatic duct not dilated.

GALLBLADDER: Surgically absent

ADRENAL GLANDS: No significant masses or asymmetry.

RIGHT KIDNEY AND URETER: No solid masses. Assessment limited by lack of IV contrast.   No significant
 calcifications.   No hydronephrosis or hydroureter.

LEFT KIDNEY AND URETER: No solid masses. Assessment limited by lack of IV contrast.   No significant 
calcifications.   No hydronephrosis or hydroureter.

AORTA AND VESSELS: No aneurysm.

RETROPERITONEUM: No retroperitoneal adenopathy, hemorrhage or masses.

APPENDIX: Surgically absent

LARGE AND SMALL BOWEL: Post subtotal colectomy.  Right lower quadrant ileostomy.  Oral contrast is se
en throughout the stomach proximal mid and distal small bowel without evidence of obstruction.  Small
 amount of postoperative free intraperitoneal air.  Right and left pericolic gutter/pelvis Bronson-Pr
att drains are present.  No findings worrisome for intra-abdominal or pelvic abscess

ABDOMINAL WALL: Healing anterior incision with wound VAC.  Right lower quadrant ileostomy

PERITONEAL CAVITY: Postoperative free air.  No significant free pelvic or abdominal fluid.

PELVIS: Bladder decompressed by a Whiteside catheter

BONES: No significant or acute findings.

OTHER: Third-spacing of fluid along the abdominal wall



IMPRESSION:  Bilateral lower lobe collapse and consolidation with small pleural effusions

Small amount of postoperative free intraperitoneal air.  No intra-abdominal or pelvic masses or adeno
dandy.  No abscess.  Post subtotal colectomy with right lower quadrant ileostomy.



TECHNICAL DOCUMENTATION:  JOB ID:  [id number]

Quality ID # 436: Final reports with documentation of one or more dose reduction techniques (e.g., Au
tomated exposure control, adjustment of the mA and/or kV according to patient size, use of iterative 
reconstruction technique)

 2011 [employer]- All Rights Reserved



Reading location - IP/workstation name: [email or web address]

## 2019-02-16 NOTE — PDOC PROGRESS REPORT
Subjective


Progress Note for:: 02/16/19


Subjective:: 





No adverse events overnight.  She remains sedated and intubated.  Sedation has 

been lightened, and she has aroused some but she is still not awake.  White 

blood cell count remains elevated, but she is on hydrocortisone.  She remains 

afebrile.  She still not put out any urine.  She had 2900 mL's off with dialysis

yesterday.  Blood pressure was a little bit low today and we had to give her a 1

L bolus, to which her pressure responded.  She got a couple units of blood 

yesterday.  Platelets remain low but stable, not dropping.


Reason For Visit: 


DIVERTICULITIS,SIGMOID AND DESCENDING COLON,INTRA-








Physical Exam


Vital Signs: 


                                        











Temp Pulse Resp BP Pulse Ox


 


 97.9 F   93   8 L  125/75   100 


 


 02/16/19 15:26  02/16/19 10:00  02/16/19 15:26  02/16/19 15:26  02/16/19 15:26








                                 Intake & Output











 02/15/19 02/16/19 02/17/19





 06:59 06:59 06:59


 


Intake Total 1587 2587 1200


 


Output Total 928 3882 313


 


Balance 659 -1295 887


 


Weight 93.4 kg 91.5 kg 








General appearance: PRESENT: No acute distress, other - Sedated, intubated


Respiratory exam: PRESENT: crackles - Bilateral, symmetrical, unlabored.  

ABSENT: prolonged expiratory phase, rhonchi, tachypnea, wheezes


Cardiovascular exam: PRESENT: +S1, +S2, tachycardia


Vascular exam: ABSENT: normal capillary refill - Capillary refill was 6 seconds


GI/Abdominal exam: PRESENT: diminished bowel sounds, distended, soft, other - 

Midline abdominal decision was covered with a bulky dressing, she had 3 DAY 

drains.  ABSENT: rebound, tenderness


Extremities exam: PRESENT: pedal edema, other - She has 3+ or greater 

generalized pitting edema in her upper and lower extremities as well as diffuse 

anasarca.  She has a right IJ central line and a left groin trialysis catheter. 

ABSENT: clubbing


Musculoskeletal exam: ABSENT: deformity, tenderness


Neurological exam: PRESENT: other - Sedated, intubated


Skin exam: PRESENT: jaundice, mottled.  ABSENT: warm





Results


Laboratory Results: 


                                        





                                 02/16/19 14:00 





                                 02/16/19 04:40 





                                        











  02/15/19 02/15/19 02/16/19





  10:33 21:25 04:40


 


WBC   26.4 H 


 


RBC   3.40 L 


 


Hgb   10.3 L D 


 


Hct   29.9 L 


 


MCV   88 


 


MCH   30.3 


 


MCHC   34.4 


 


RDW   16.6 H 


 


Plt Count   38 L 


 


Seg Neutrophils %   


 


Lymphocytes %   


 


Monocytes %   


 


Eosinophils %   


 


Basophils %   


 


Absolute Neutrophils   


 


Absolute Lymphocytes   


 


Absolute Monocytes   


 


Absolute Eosinophils   


 


Absolute Basophils   


 


Carbonic Acid    1.14


 


HCO3/H2CO3 Ratio    21:1


 


ABG pH    7.44


 


ABG pCO2    38.0


 


ABG pO2    92.9


 


ABG HCO3    25.0 H


 


ABG O2 Saturation    97.4


 


ABG Base Excess    0.9


 


FiO2    30%


 


Sodium   


 


Potassium   


 


Chloride   


 


Carbon Dioxide   


 


Anion Gap   


 


BUN   


 


Creatinine   


 


Est GFR ( Amer)   


 


Est GFR (Non-Af Amer)   


 


Glucose   


 


Calcium   


 


Magnesium   


 


Total Bilirubin   


 


AST   


 


ALT   


 


Alkaline Phosphatase   


 


Total Protein   


 


Albumin   


 


Blood Type  O POSITIVE  


 


Antibody Screen  NEGATIVE  














  02/16/19 02/16/19 02/16/19





  04:40 04:40 14:00


 


WBC  27.5 H   25.0 H


 


RBC  3.40 L   2.80 L


 


Hgb  10.3 L   8.3 L


 


Hct  29.9 L   25.1 L


 


MCV  88   90


 


MCH  30.2   29.8


 


MCHC  34.3   33.3


 


RDW  16.7 H   17.3 H


 


Plt Count  40 L   45 L


 


Seg Neutrophils %  Not Reportable  


 


Lymphocytes %  Not Reportable  


 


Monocytes %  Not Reportable  


 


Eosinophils %  Not Reportable  


 


Basophils %  Not Reportable  


 


Absolute Neutrophils  Not Reportable  


 


Absolute Lymphocytes  Not Reportable  


 


Absolute Monocytes  Not Reportable  


 


Absolute Eosinophils  Not Reportable  


 


Absolute Basophils  Not Reportable  


 


Carbonic Acid   


 


HCO3/H2CO3 Ratio   


 


ABG pH   


 


ABG pCO2   


 


ABG pO2   


 


ABG HCO3   


 


ABG O2 Saturation   


 


ABG Base Excess   


 


FiO2   


 


Sodium   138.2 


 


Potassium   4.0 


 


Chloride   105 


 


Carbon Dioxide   25 


 


Anion Gap   8 


 


BUN   34 H 


 


Creatinine   1.25 


 


Est GFR ( Amer)   53 L 


 


Est GFR (Non-Af Amer)   44 L 


 


Glucose   187 H 


 


Calcium   7.4 L 


 


Magnesium   1.9 


 


Total Bilirubin   3.4 H 


 


AST   94 H 


 


ALT   527 H 


 


Alkaline Phosphatase   266 H 


 


Total Protein   3.4 L 


 


Albumin   2.1 L 


 


Blood Type   


 


Antibody Screen   








                                        





02/11/19 14:28   Blood   Blood Culture - Final


                            Clostridium Sp.not Perfringens





                                        











  02/13/19 02/13/19





  06:04 06:04


 


Creatine Kinase  136 H 


 


CK-MB (CK-2)   3.43


 


Troponin I   0.033











Impressions: 


                                        





Abdomen Ultrasound  02/07/19 00:00


IMPRESSION:


 


No acute sonographic abnormality.


 








Catheter Patency X-Ray  02/08/19 00:00


IMPRESSION:  SMALL RESIDUAL ABSCESS CAVITY.  FISTULOUS COMMUNICATION WITH THE 

DISTAL DESCENDING COLON WITH COLONIC FINDINGS AS DESCRIBED ABOVE.


 








Fluoroscopy  02/08/19 00:00


IMPRESSION:  SMALL RESIDUAL ABSCESS CAVITY.  FISTULOUS COMMUNICATION WITH THE 

DISTAL DESCENDING COLON WITH COLONIC FINDINGS AS DESCRIBED ABOVE.


 








Chest X-Ray  02/14/19 00:00


IMPRESSION:  Satisfactory position of support apparatus.  No pneumothorax.


 








Abdomen/Pelvis CT  02/16/19 10:44


IMPRESSION:  Bilateral lower lobe collapse and consolidation with small pleural 

effusions


Small amount of postoperative free intraperitoneal air.  No intra-abdominal or 

pelvic masses or adenopathy.  No abscess.  Post subtotal colectomy with right 

lower quadrant ileostomy.


 








Chest CT  02/16/19 10:44


IMPRESSION:  Bilateral lower lobe collapse and consolidation with small pleural 

effusions


Small amount of postoperative free intraperitoneal air.  No intra-abdominal or 

pelvic masses or adenopathy.  No abscess.  Post subtotal colectomy with right 

lower quadrant ileostomy.


 














Assessment & Plan





- Diagnosis


(1) Septic shock


Is this a current diagnosis for this admission?: Yes   


Plan: 


She has evidence of multisystem organ failure, but there are some signs of 

improvement.  Her creatinine has improved and she has had improvement in her 

blood pressure.  She is on broad-spectrum antibiotic coverage for likely 

organisms.  Liver enzymes and bilirubin continue to trend down.  She is off the 

Cardizem drip and her heart rate is holding stable.  She got a couple more units

of blood yesterday.  Fluid from the DAY drains was negative for creatinine.  Felt

to be unlikely that she has a ureteral injury that is draining urine into the 

belly.  She is getting dialysis and is being followed by nephrology.





She had a CT of the chest abdomen and pelvis, which showed some pleural 

effusions, which are to be expected from her volume overload, but the 

possibility for pneumonia could not be ruled out.  At this point, she has good 

gram-negative coverage with Zosyn, and good anaerobic coverage with Flagyl.  Low

suspicion for the need for antifungals.  Therefore, if any further coverage 

would be needed, it would be gram-positive coverage.  There is nothing from her 

cultures that would indicate gram-positive organism.  A lot of what were seen in

her chest could be from fluid overload, but if we decide to treat for gram 

positives, there are multiple considerations here.  Ideally, we would use 

daptomycin, but daptomycin is not indicated for pneumonia.  We could use 

vancomycin, but given her renal issues, which could be made even worse by the 

fact that she is on Zosyn, this is not a completely attractive option.  The 

other option would be Zyvox, but with her platelets, and some of the oozing of b

lood she has had, this option is also not without risk.  We will talk to surgery

to discuss whether or not we should cover for gram positives, and also which 

option would do the most good and the least harm.








(2) Colonic fistula


Is this a current diagnosis for this admission?: Yes   


Plan: 


Management per surgery








- Time


Time Spent with patient: 35 or more minutes

## 2019-02-17 LAB
ABSOLUTE LYMPHOCYTES# (MANUAL): 4.2 10^3/UL (ref 0.5–4.7)
ABSOLUTE LYMPHOCYTES# (MANUAL): 4.2 10^3/UL (ref 0.5–4.7)
ABSOLUTE MONOCYTES # (MANUAL): 1.7 10^3/UL (ref 0.1–1.4)
ABSOLUTE MONOCYTES # (MANUAL): 2.6 10^3/UL (ref 0.1–1.4)
ABSOLUTE NEUTROPHILS# (MANUAL): 22.2 10^3/UL (ref 1.7–8.2)
ABSOLUTE NEUTROPHILS# (MANUAL): 25.4 10^3/UL (ref 1.7–8.2)
ADD MANUAL DIFF: YES
ADD MANUAL DIFF: YES
ALBUMIN SERPL-MCNC: 2 G/DL (ref 3.5–5)
ALP SERPL-CCNC: 337 U/L (ref 38–126)
ALT SERPL-CCNC: 302 U/L (ref 9–52)
ANION GAP SERPL CALC-SCNC: 9 MMOL/L (ref 5–19)
ANISOCYTOSIS BLD QL SMEAR: (no result)
ANISOCYTOSIS BLD QL SMEAR: (no result)
APTT BLD: 35.6 SEC (ref 23.5–35.8)
ARTERIAL BLOOD FIO2: (no result)
ARTERIAL BLOOD H2CO3: 1.04 MMOL/L (ref 1.05–1.35)
ARTERIAL BLOOD HCO3: 21.2 MMOL/L (ref 20–24)
ARTERIAL BLOOD PCO2: 34.4 MMHG (ref 35–45)
ARTERIAL BLOOD PH: 7.41 (ref 7.35–7.45)
ARTERIAL BLOOD PO2: 102.3 MMHG (ref 80–100)
ARTERIAL BLOOD TOTAL CO2: 22.3 MMOL/L (ref 21–25)
AST SERPL-CCNC: 51 U/L (ref 14–36)
BASE EXCESS BLDA CALC-SCNC: -2.9 MMOL/L
BASOPHILS NFR BLD MANUAL: 0 % (ref 0–2)
BASOPHILS NFR BLD MANUAL: 0 % (ref 0–2)
BILIRUB DIRECT SERPL-MCNC: 2.8 MG/DL (ref 0–0.4)
BILIRUB SERPL-MCNC: 3.4 MG/DL (ref 0.2–1.3)
BUN SERPL-MCNC: 47 MG/DL (ref 7–20)
BURR CELLS BLD QL SMEAR: SLIGHT
CALCIUM: 7.3 MG/DL (ref 8.4–10.2)
CHLORIDE SERPL-SCNC: 104 MMOL/L (ref 98–107)
CO2 SERPL-SCNC: 23 MMOL/L (ref 22–30)
EOSINOPHIL NFR BLD MANUAL: 0 % (ref 0–6)
EOSINOPHIL NFR BLD MANUAL: 0 % (ref 0–6)
ERYTHROCYTE [DISTWIDTH] IN BLOOD BY AUTOMATED COUNT: 16.4 % (ref 11.5–14)
ERYTHROCYTE [DISTWIDTH] IN BLOOD BY AUTOMATED COUNT: 17.5 % (ref 11.5–14)
ERYTHROCYTE [DISTWIDTH] IN BLOOD BY AUTOMATED COUNT: 17.5 % (ref 11.5–14)
GLUCOSE SERPL-MCNC: 207 MG/DL (ref 75–110)
HCT VFR BLD CALC: 25.3 % (ref 36–47)
HCT VFR BLD CALC: 26 % (ref 36–47)
HCT VFR BLD CALC: 26.9 % (ref 36–47)
HGB BLD-MCNC: 8.4 G/DL (ref 12–15.5)
HGB BLD-MCNC: 8.8 G/DL (ref 12–15.5)
HGB BLD-MCNC: 9.1 G/DL (ref 12–15.5)
INR PPP: 1.34
MCH RBC QN AUTO: 30.3 PG (ref 27–33.4)
MCH RBC QN AUTO: 30.4 PG (ref 27–33.4)
MCH RBC QN AUTO: 30.5 PG (ref 27–33.4)
MCHC RBC AUTO-ENTMCNC: 33.4 G/DL (ref 32–36)
MCHC RBC AUTO-ENTMCNC: 33.7 G/DL (ref 32–36)
MCHC RBC AUTO-ENTMCNC: 33.9 G/DL (ref 32–36)
MCV RBC AUTO: 90 FL (ref 80–97)
MCV RBC AUTO: 91 FL (ref 80–97)
MCV RBC AUTO: 91 FL (ref 80–97)
MONOCYTES % (MANUAL): 6 % (ref 3–13)
MONOCYTES % (MANUAL): 8 % (ref 3–13)
NRBC BLD AUTO-RTO: 3 /100 WBC
NRBC BLD AUTO-RTO: 6 /100 WBC
OVALOCYTES BLD QL SMEAR: (no result)
PLATELET # BLD: 85 10^3/UL (ref 150–450)
PLATELET # BLD: 91 10^3/UL (ref 150–450)
PLATELET # BLD: 92 10^3/UL (ref 150–450)
PLATELET COMMENT: (no result)
PLATELET COMMENT: (no result)
POIKILOCYTOSIS BLD QL SMEAR: (no result)
POLYCHROMASIA BLD QL SMEAR: (no result)
POTASSIUM SERPL-SCNC: 4.5 MMOL/L (ref 3.6–5)
PROT SERPL-MCNC: 3.6 G/DL (ref 6.3–8.2)
PROTHROMBIN TIME: 17.2 SEC (ref 11.4–15.4)
RBC # BLD AUTO: 2.79 10^6/UL (ref 3.72–5.28)
RBC # BLD AUTO: 2.87 10^6/UL (ref 3.72–5.28)
RBC # BLD AUTO: 3 10^6/UL (ref 3.72–5.28)
SAO2 % BLDA: 97.8 % (ref 94–98)
SCHISTOCYTES BLD QL SMEAR: (no result)
SEGMENTED NEUTROPHILS % (MAN): 79 % (ref 42–78)
SEGMENTED NEUTROPHILS % (MAN): 79 % (ref 42–78)
SODIUM SERPL-SCNC: 136.1 MMOL/L (ref 137–145)
TOTAL CELLS COUNTED BLD: 100
TOTAL CELLS COUNTED BLD: 100
TOXIC GRANULES BLD QL SMEAR: (no result)
VARIANT LYMPHS NFR BLD MANUAL: 13 % (ref 13–45)
VARIANT LYMPHS NFR BLD MANUAL: 15 % (ref 13–45)
WBC # BLD AUTO: 28.1 10^3/UL (ref 4–10.5)
WBC # BLD AUTO: 32.2 10^3/UL (ref 4–10.5)
WBC # BLD AUTO: 32.9 10^3/UL (ref 4–10.5)

## 2019-02-17 PROCEDURE — 0DBU0ZZ EXCISION OF OMENTUM, OPEN APPROACH: ICD-10-PCS | Performed by: SURGERY

## 2019-02-17 PROCEDURE — 0JB80ZZ EXCISION OF ABDOMEN SUBCUTANEOUS TISSUE AND FASCIA, OPEN APPROACH: ICD-10-PCS | Performed by: SURGERY

## 2019-02-17 PROCEDURE — 0DSB0ZZ REPOSITION ILEUM, OPEN APPROACH: ICD-10-PCS | Performed by: SURGERY

## 2019-02-17 RX ADMIN — IPRATROPIUM BROMIDE AND ALBUTEROL SULFATE SCH ML: 2.5; .5 SOLUTION RESPIRATORY (INHALATION) at 07:53

## 2019-02-17 RX ADMIN — METRONIDAZOLE SCH MLS/HR: 500 INJECTION, SOLUTION INTRAVENOUS at 11:16

## 2019-02-17 RX ADMIN — IPRATROPIUM BROMIDE AND ALBUTEROL SULFATE SCH ML: 2.5; .5 SOLUTION RESPIRATORY (INHALATION) at 14:48

## 2019-02-17 RX ADMIN — SUCRALFATE SCH: 1 SUSPENSION ORAL at 20:04

## 2019-02-17 RX ADMIN — ENOXAPARIN SODIUM SCH: 40 INJECTION SUBCUTANEOUS at 11:02

## 2019-02-17 RX ADMIN — HYDROCORTISONE SODIUM SUCCINATE SCH MG: 100 INJECTION, POWDER, FOR SOLUTION INTRAMUSCULAR; INTRAVENOUS at 11:07

## 2019-02-17 RX ADMIN — IPRATROPIUM BROMIDE AND ALBUTEROL SULFATE SCH ML: 2.5; .5 SOLUTION RESPIRATORY (INHALATION) at 03:10

## 2019-02-17 RX ADMIN — Medication SCH: at 15:11

## 2019-02-17 RX ADMIN — METRONIDAZOLE SCH MLS/HR: 500 INJECTION, SOLUTION INTRAVENOUS at 23:30

## 2019-02-17 RX ADMIN — Medication SCH UNIT: at 05:45

## 2019-02-17 RX ADMIN — PIPERACILLIN AND TAZOBACTAM SCH GM: 2; .25 INJECTION, POWDER, LYOPHILIZED, FOR SOLUTION INTRAVENOUS; PARENTERAL at 05:45

## 2019-02-17 RX ADMIN — LEUCINE, PHENYLALANINE, LYSINE, METHIONINE, ISOLEUCINE, VALINE, HISTIDINE, THREONINE, TRYPTOPHAN, ALANINE, GLYCINE, ARGININE, PROLINE, SERINE, TYROSINE, DEXTROSE PRN MLS/HR: 365; 280; 290; 200; 300; 290; 240; 210; 90; 1035; 515; 575; 340; 250; 20; 25 INJECTION INTRAVENOUS at 11:05

## 2019-02-17 RX ADMIN — METRONIDAZOLE SCH MLS/HR: 500 INJECTION, SOLUTION INTRAVENOUS at 00:20

## 2019-02-17 RX ADMIN — METRONIDAZOLE SCH MLS/HR: 500 INJECTION, SOLUTION INTRAVENOUS at 18:41

## 2019-02-17 RX ADMIN — PANTOPRAZOLE SODIUM SCH MG: 40 INJECTION, POWDER, FOR SOLUTION INTRAVENOUS at 11:05

## 2019-02-17 RX ADMIN — HYDROCORTISONE SODIUM SUCCINATE SCH MG: 100 INJECTION, POWDER, FOR SOLUTION INTRAMUSCULAR; INTRAVENOUS at 18:41

## 2019-02-17 RX ADMIN — PIPERACILLIN AND TAZOBACTAM SCH MLS/HR: 2; .25 INJECTION, POWDER, LYOPHILIZED, FOR SOLUTION INTRAVENOUS; PARENTERAL at 21:28

## 2019-02-17 RX ADMIN — MORPHINE SULFATE PRN MG: 10 INJECTION INTRAMUSCULAR; INTRAVENOUS; SUBCUTANEOUS at 03:02

## 2019-02-17 RX ADMIN — INSULIN HUMAN PRN UNIT: 100 INJECTION, SOLUTION PARENTERAL at 00:45

## 2019-02-17 RX ADMIN — INSULIN HUMAN PRN UNIT: 100 INJECTION, SOLUTION PARENTERAL at 23:30

## 2019-02-17 RX ADMIN — IPRATROPIUM BROMIDE AND ALBUTEROL SULFATE SCH ML: 2.5; .5 SOLUTION RESPIRATORY (INHALATION) at 20:17

## 2019-02-17 RX ADMIN — Medication SCH UNIT: at 21:27

## 2019-02-17 RX ADMIN — Medication SCH: at 21:28

## 2019-02-17 RX ADMIN — Medication SCH: at 02:02

## 2019-02-17 RX ADMIN — PANTOPRAZOLE SODIUM SCH MG: 40 INJECTION, POWDER, FOR SOLUTION INTRAVENOUS at 21:27

## 2019-02-17 RX ADMIN — METRONIDAZOLE SCH MLS/HR: 500 INJECTION, SOLUTION INTRAVENOUS at 05:44

## 2019-02-17 RX ADMIN — PIPERACILLIN AND TAZOBACTAM SCH GM: 2; .25 INJECTION, POWDER, LYOPHILIZED, FOR SOLUTION INTRAVENOUS; PARENTERAL at 00:19

## 2019-02-17 RX ADMIN — SUCRALFATE SCH: 1 SUSPENSION ORAL at 12:06

## 2019-02-17 RX ADMIN — Medication SCH: at 05:46

## 2019-02-17 RX ADMIN — ACETYLCYSTEINE SCH MG: 200 INHALANT RESPIRATORY (INHALATION) at 20:20

## 2019-02-17 RX ADMIN — SUCRALFATE SCH GM: 1 SUSPENSION ORAL at 00:24

## 2019-02-17 RX ADMIN — HYDROCORTISONE SODIUM SUCCINATE SCH MG: 100 INJECTION, POWDER, FOR SOLUTION INTRAMUSCULAR; INTRAVENOUS at 02:05

## 2019-02-17 RX ADMIN — ACETYLCYSTEINE SCH: 200 INHALANT RESPIRATORY (INHALATION) at 11:43

## 2019-02-17 RX ADMIN — SUCRALFATE SCH GM: 1 SUSPENSION ORAL at 18:47

## 2019-02-17 RX ADMIN — INSULIN HUMAN PRN UNIT: 100 INJECTION, SOLUTION PARENTERAL at 18:41

## 2019-02-17 RX ADMIN — SUCRALFATE SCH GM: 1 SUSPENSION ORAL at 23:29

## 2019-02-17 RX ADMIN — INSULIN HUMAN PRN UNIT: 100 INJECTION, SOLUTION PARENTERAL at 06:29

## 2019-02-17 NOTE — PDOC PROGRESS REPORT
Subjective


Progress Note for:: 02/17/19


Subjective:: 





Intubated sedated


Reason For Visit: 


DIVERTICULITIS,SIGMOID AND DESCENDING COLON,INTRA-








Physical Exam


Vital Signs: 


                                        











Temp Pulse Resp BP Pulse Ox


 


 97.9 F   105 H  8 L  112/60   99 


 


 02/17/19 11:18  02/17/19 11:18  02/17/19 11:18  02/17/19 11:18  02/17/19 11:18








                                 Intake & Output











 02/16/19 02/17/19 02/18/19





 06:59 06:59 06:59


 


Intake Total 2587 2996 1262


 


Output Total 3882 2658 55


 


Balance -5696 222 9479


 


Weight 91.5 kg 93 kg 











General appearance: PRESENT: morbidly obese.  ABSENT: no acute distress, 

cooperative, disheveled


Head exam: PRESENT: atraumatic, normocephalic


Eye exam: PRESENT: conjunctiva pale.  ABSENT: EOMI, nystagmus


Mouth exam: PRESENT: dry mucosa, neck supple, other - ET tube


Neck exam: ABSENT: carotid bruit, full ROM, JVD, lymphadenopathy, meningismus, 

tenderness, thyromegaly, tracheal deviation, tracheostomy, other


Respiratory exam: PRESENT: decreased breath sounds, prolonged expiratory phas, 

rales, rhonchi, unlabored.  ABSENT: retraction


Cardiovascular exam: PRESENT: irregular rhythm, tachycardia


Pulses: PRESENT: normal radial pulses


GI/Abdominal exam: PRESENT: other - Status post surgery and multiple drains 

wound VAC


Gentrourinary exam: PRESENT: indwelling catheter


Extremities exam: PRESENT: +1 edema.  ABSENT: clubbing, joint swelling


Musculoskeletal exam: ABSENT: ambulatory, deformity, dislocation


Neurological exam: ABSENT: awake


Skin exam: PRESENT: dry, warm





Results


Laboratory Results: 


                                        





                                 02/17/19 05:55 





                                 02/17/19 05:55 





                                        











  02/15/19 02/16/19 02/17/19





  10:33 14:00 02:30


 


WBC   25.0 H  32.9 H*


 


RBC   2.80 L  3.00 L


 


Hgb   8.3 L  9.1 L


 


Hct   25.1 L  26.9 L


 


MCV   90  90


 


MCH   29.8  30.4


 


MCHC   33.3  33.9


 


RDW   17.3 H  17.5 H


 


Plt Count   45 L  85 L


 


Seg Neutrophils %   


 


Lymphocytes %   


 


Monocytes %   


 


Eosinophils %   


 


Basophils %   


 


Absolute Neutrophils   


 


Absolute Lymphocytes   


 


Absolute Monocytes   


 


Absolute Eosinophils   


 


Absolute Basophils   


 


Carbonic Acid   


 


HCO3/H2CO3 Ratio   


 


ABG pH   


 


ABG pCO2   


 


ABG pO2   


 


ABG HCO3   


 


ABG O2 Saturation   


 


ABG Base Excess   


 


FiO2   


 


Sodium   


 


Potassium   


 


Chloride   


 


Carbon Dioxide   


 


Anion Gap   


 


BUN   


 


Creatinine   


 


Est GFR ( Amer)   


 


Est GFR (Non-Af Amer)   


 


Glucose   


 


Calcium   


 


Magnesium   


 


Total Bilirubin   


 


AST   


 


ALT   


 


Alkaline Phosphatase   


 


Total Protein   


 


Albumin   


 


Blood Type  O POSITIVE  


 


Antibody Screen  NEGATIVE  














  02/17/19 02/17/19 02/17/19





  04:07 05:55 05:55


 


WBC   32.2 H* 


 


RBC   2.87 L 


 


Hgb   8.8 L 


 


Hct   26.0 L 


 


MCV   91 


 


MCH   30.5 


 


MCHC   33.7 


 


RDW   17.5 H 


 


Plt Count   91 L 


 


Seg Neutrophils %   Not Reportable 


 


Lymphocytes %   Not Reportable 


 


Monocytes %   Not Reportable 


 


Eosinophils %   Not Reportable 


 


Basophils %   Not Reportable 


 


Absolute Neutrophils   Not Reportable 


 


Absolute Lymphocytes   Not Reportable 


 


Absolute Monocytes   Not Reportable 


 


Absolute Eosinophils   Not Reportable 


 


Absolute Basophils   Not Reportable 


 


Carbonic Acid  1.04 L  


 


HCO3/H2CO3 Ratio  20:1  


 


ABG pH  7.41  


 


ABG pCO2  34.4 L  


 


ABG pO2  102.3 H  


 


ABG HCO3  21.2  


 


ABG O2 Saturation  97.8  


 


ABG Base Excess  -2.9  


 


FiO2  30%  


 


Sodium    136.1 L


 


Potassium    4.5


 


Chloride    104


 


Carbon Dioxide    23


 


Anion Gap    9


 


BUN    47 H


 


Creatinine    1.51 H


 


Est GFR ( Amer)    43 L


 


Est GFR (Non-Af Amer)    35 L


 


Glucose    207 H


 


Calcium    7.3 L


 


Magnesium    1.8


 


Total Bilirubin    3.4 H


 


AST    51 H


 


ALT    302 H


 


Alkaline Phosphatase    337 H


 


Total Protein    3.6 L


 


Albumin    2.0 L


 


Blood Type   


 


Antibody Screen   








                                        





02/11/19 14:28   Blood   Blood Culture - Final


                            Clostridium Sp.not Perfringens





                                        











  02/13/19 02/13/19





  06:04 06:04


 


Creatine Kinase  136 H 


 


CK-MB (CK-2)   3.43


 


Troponin I   0.033











Impressions: 


                                        





Abdomen Ultrasound  02/07/19 00:00


IMPRESSION:


 


No acute sonographic abnormality.


 








Catheter Patency X-Ray  02/08/19 00:00


IMPRESSION:  SMALL RESIDUAL ABSCESS CAVITY.  FISTULOUS COMMUNICATION WITH THE 

DISTAL DESCENDING COLON WITH COLONIC FINDINGS AS DESCRIBED ABOVE.


 








Fluoroscopy  02/08/19 00:00


IMPRESSION:  SMALL RESIDUAL ABSCESS CAVITY.  FISTULOUS COMMUNICATION WITH THE 

DISTAL DESCENDING COLON WITH COLONIC FINDINGS AS DESCRIBED ABOVE.


 








Abdomen/Pelvis CT  02/16/19 10:44


IMPRESSION:  Bilateral lower lobe collapse and consolidation with small pleural 

effusions


Small amount of postoperative free intraperitoneal air.  No intra-abdominal or 

pelvic masses or adenopathy.  No abscess.  Post subtotal colectomy with right 

lower quadrant ileostomy.


 








Chest CT  02/16/19 10:44


IMPRESSION:  Bilateral lower lobe collapse and consolidation with small pleural 

effusions


Small amount of postoperative free intraperitoneal air.  No intra-abdominal or 

pelvic masses or adenopathy.  No abscess.  Post subtotal colectomy with right 

lower quadrant ileostomy.


 








Chest X-Ray  02/17/19 06:00


IMPRESSION:


 


No significant change.


 














Assessment & Plan





- Diagnosis


(1) Acute kidney failure with tubular necrosis


Is this a current diagnosis for this admission?: Yes   


Plan: 


hemodialysis








(2) Septic shock


Is this a current diagnosis for this admission?: Yes   


Plan: 


                              Labs- All tests 24 hr











  02/11/19 02/11/19 02/11/19





  13:10 13:32 21:45


 


WBC   20.0 H  32.4 H*


 


Hgb   10.1 L  9.2 L


 


Plt Count   344  279


 


ABG pH  7.17 L*  


 


ABG pCO2  48.2 H  


 


ABG pO2  150.8 H  


 


FiO2  40%  














  02/11/19 02/12/19 02/12/19





  21:53 03:54 03:54


 


WBC   23.6 H 


 


Hgb   8.8 L 


 


Plt Count   157 


 


ABG pH  7.10 L*   7.39


 


ABG pCO2  33.0 L   28.1 L


 


ABG pO2  481.2 H   149.5 H


 


FiO2  100%   40%














  02/12/19 02/12/19 02/12/19





  10:40 20:25 23:12


 


WBC   


 


Hgb   


 


Plt Count   


 


ABG pH  7.59 H  7.64 H*  7.47 H


 


ABG pCO2  22.7 L  22.6 L  32.3 L


 


ABG pO2  84.6  108.8 H  113.4 H


 


FiO2  30%  30%  30%














  02/12/19 02/13/19 02/13/19





  23:46 04:40 04:40


 


WBC  22.4 H  24.0 H 


 


Hgb  5.6 L D  7.8 L D 


 


Plt Count  79 L  69 L 


 


ABG pH    7.46 H


 


ABG pCO2    35.4


 


ABG pO2    98.8


 


FiO2    30%














  02/13/19





  14:10


 


WBC 


 


Hgb 


 


Plt Count 


 


ABG pH  7.45


 


ABG pCO2  34.3 L


 


ABG pO2  116.9 H


 


FiO2  30%








                                                                                











 02/11/19 18:30 Blood Culture - Preliminary





 Blood      Gram Positive Nito


 


 02/07/19 01:40 Gram Stain - Final





 Abdomen - Abscess Wound Culture - Final





      Escherichia Coli





      Enterococcus Faecalis(Group D)





      Prevotella Species





      Skin Monik


 


 02/06/19 16:00 Blood Culture - Final





 Blood      Clostridium Sp.not Perfringens





compensatory respiratory alkalosis








(3) Abdominal abscess


Is this a current diagnosis for this admission?: Yes   


Plan: 


Per surgery


                                                                                











 02/11/19 18:30 Blood Culture - Final





 Blood      Clostridium Sp.not Perfringens


 


 02/11/19 14:28 Blood Culture - Final





 Blood      Clostridium Sp.not Perfringens


 


 02/07/19 01:40 Gram Stain - Final





 Abdomen - Abscess Wound Culture - Final





      Escherichia Coli





      Enterococcus Faecalis(Group D)





      Prevotella Species





      Skin Monik


 


 02/06/19 16:00 Blood Culture - Final





 Blood      Clostridium Sp.not Perfringens














(4) Thrombocytopenia


Is this a current diagnosis for this admission?: Yes   


Plan: 


                              Labs- All tests 24 hr











  02/16/19 02/16/19 02/17/19





  04:40 14:00 02:30


 


Plt Count  40 L  45 L  85 L














  02/17/19





  05:55


 


Plt Count  91 L

















- Time


Total Critical Time (Minutes): 50

## 2019-02-17 NOTE — OPERATIVE REPORT
Nonrecallable Operative Report


DATE OF SURGERY: 02/17/19


PREOPERATIVE DIAGNOSIS: S/p total colectomy and end-ileostomy.  sepsis.  

leukocytosis.  Acute renal failure with anuria.  Dialysis status.  Respiratory 

failure


POSTOPERATIVE DIAGNOSIS: same, nectoic omentum, ischemic end ileostomy, ischemic

surgical wound skin and fascia


OPERATION: exploratory laparotomy, omentectomy, sharp debridment of abdominal 

surgical wound (skin/fat/fascia), revision of ileostomy


SURGEON: STEF CIFUENTES


ANESTHESIA: GA


TISSUE REMOVED OR ALTERED: omentum, end ileostomy , skin/fat/fascia of abdominal

wound


COMPLICATIONS: 





none


ESTIMATED BLOOD LOSS: < 50 mL


INTRAOPERATIVE FINDINGS: necrotic omentum, ischemic end-ileostomy, necrotic ski

n. fat and fascia from abdominal laprotomy wound


PROCEDURE: 





see dictation

## 2019-02-17 NOTE — PDOC PROGRESS REPORT
Subjective


Progress Note for:: 02/17/19


Subjective:: 





Intubated, sedated, not responsive


Reason For Visit: 


DIVERTICULITIS,SIGMOID AND DESCENDING COLON,INTRA-








Physical Exam


Vital Signs: 


                                        











Temp Pulse Resp BP Pulse Ox


 


 99.3 F   108 H  8 L  112/73   99 


 


 02/17/19 06:00  02/17/19 03:10  02/17/19 06:00  02/17/19 05:46  02/17/19 06:00








                                 Intake & Output











 02/16/19 02/17/19 02/18/19





 06:59 06:59 06:59


 


Intake Total 2587 2896 


 


Output Total 3882 2658 


 


Balance -1295 238 


 


Weight 91.5 kg 93 kg 











General appearance: PRESENT: obese, other - not responsive


Respiratory exam: PRESENT: clear to auscultation umu


Cardiovascular exam: PRESENT: RRR


GI/Abdominal exam: PRESENT: hypoactive bowel sounds, soft, other - 1) midline 

wound covered with WoundVax 2) Juan drains (2) with serous fluid 3) Ileostomy 

with duskiness of mucosa, clotted blood, patent on digitalization, no output


Extremities exam: PRESENT: +2 edema - all extremities


Skin exam: PRESENT: warm





Results


Laboratory Results: 


                                        





                                 02/17/19 05:55 





                                        











  02/15/19 02/16/19 02/17/19





  10:33 14:00 02:30


 


WBC   25.0 H  32.9 H*


 


RBC   2.80 L  3.00 L


 


Hgb   8.3 L  9.1 L


 


Hct   25.1 L  26.9 L


 


MCV   90  90


 


MCH   29.8  30.4


 


MCHC   33.3  33.9


 


RDW   17.3 H  17.5 H


 


Plt Count   45 L  85 L


 


Seg Neutrophils %   


 


Lymphocytes %   


 


Monocytes %   


 


Eosinophils %   


 


Basophils %   


 


Absolute Neutrophils   


 


Absolute Lymphocytes   


 


Absolute Monocytes   


 


Absolute Eosinophils   


 


Absolute Basophils   


 


Carbonic Acid   


 


HCO3/H2CO3 Ratio   


 


ABG pH   


 


ABG pCO2   


 


ABG pO2   


 


ABG HCO3   


 


ABG O2 Saturation   


 


ABG Base Excess   


 


FiO2   


 


Sodium   


 


Potassium   


 


Chloride   


 


Carbon Dioxide   


 


Anion Gap   


 


BUN   


 


Creatinine   


 


Est GFR ( Amer)   


 


Est GFR (Non-Af Amer)   


 


Glucose   


 


Calcium   


 


Magnesium   


 


Total Bilirubin   


 


AST   


 


ALT   


 


Alkaline Phosphatase   


 


Total Protein   


 


Albumin   


 


Blood Type  O POSITIVE  


 


Antibody Screen  NEGATIVE  














  02/17/19 02/17/19 02/17/19





  04:07 05:55 05:55


 


WBC   


 


RBC   


 


Hgb   


 


Hct   


 


MCV   


 


MCH   


 


MCHC   


 


RDW   


 


Plt Count   


 


Seg Neutrophils %   Not Reportable 


 


Lymphocytes %   Not Reportable 


 


Monocytes %   Not Reportable 


 


Eosinophils %   Not Reportable 


 


Basophils %   Not Reportable 


 


Absolute Neutrophils   Not Reportable 


 


Absolute Lymphocytes   Not Reportable 


 


Absolute Monocytes   Not Reportable 


 


Absolute Eosinophils   Not Reportable 


 


Absolute Basophils   Not Reportable 


 


Carbonic Acid  1.04 L  


 


HCO3/H2CO3 Ratio  20:1  


 


ABG pH  7.41  


 


ABG pCO2  34.4 L  


 


ABG pO2  102.3 H  


 


ABG HCO3  21.2  


 


ABG O2 Saturation  97.8  


 


ABG Base Excess  -2.9  


 


FiO2  30%  


 


Sodium    136.1 L


 


Potassium    4.5


 


Chloride    104


 


Carbon Dioxide    23


 


Anion Gap    9


 


BUN    47 H


 


Creatinine    1.51 H


 


Est GFR ( Amer)    43 L


 


Est GFR (Non-Af Amer)    35 L


 


Glucose    207 H


 


Calcium    7.3 L


 


Magnesium    1.8


 


Total Bilirubin    3.4 H


 


AST    51 H


 


ALT    302 H


 


Alkaline Phosphatase    337 H


 


Total Protein    3.6 L


 


Albumin    2.0 L


 


Blood Type   


 


Antibody Screen   








                                        





02/11/19 14:28   Blood   Blood Culture - Final


                            Clostridium Sp.not Perfringens





                                        











  02/13/19 02/13/19





  06:04 06:04


 


Creatine Kinase  136 H 


 


CK-MB (CK-2)   3.43


 


Troponin I   0.033











Impressions: 


                                        





Abdomen Ultrasound  02/07/19 00:00


IMPRESSION:


 


No acute sonographic abnormality.


 








Catheter Patency X-Ray  02/08/19 00:00


IMPRESSION:  SMALL RESIDUAL ABSCESS CAVITY.  FISTULOUS COMMUNICATION WITH THE 

DISTAL DESCENDING COLON WITH COLONIC FINDINGS AS DESCRIBED ABOVE.


 








Fluoroscopy  02/08/19 00:00


IMPRESSION:  SMALL RESIDUAL ABSCESS CAVITY.  FISTULOUS COMMUNICATION WITH THE 

DISTAL DESCENDING COLON WITH COLONIC FINDINGS AS DESCRIBED ABOVE.


 








Abdomen/Pelvis CT  02/16/19 10:44


IMPRESSION:  Bilateral lower lobe collapse and consolidation with small pleural 

effusions


Small amount of postoperative free intraperitoneal air.  No intra-abdominal or 

pelvic masses or adenopathy.  No abscess.  Post subtotal colectomy with right 

lower quadrant ileostomy.


 








Chest CT  02/16/19 10:44


IMPRESSION:  Bilateral lower lobe collapse and consolidation with small pleural 

effusions


Small amount of postoperative free intraperitoneal air.  No intra-abdominal or 

pelvic masses or adenopathy.  No abscess.  Post subtotal colectomy with right 

lower quadrant ileostomy.


 














Assessment & Plan





- Diagnosis


(1) Colonic fistula


Is this a current diagnosis for this admission?: Yes   





(2) Diverticulitis large intestine


Qualifiers: 


   Diverticulitis bleeding: without bleeding   Diverticulitis complication: with

perforation and abscess   Qualified Code(s): K57.20 - Diverticulitis of large 

intestine with perforation and abscess without bleeding   


Is this a current diagnosis for this admission?: Yes   





(3) Abscess of abdominal cavity


Is this a current diagnosis for this admission?: Yes   





- Plan Summary


Plan Summary: 





A/





POD#6 after lapaortomy, subtotal colectomy and end-ileostomy for perforated 

colon 


Respiratory: intubated, acceptable ABG's this AM; CT scan Chest shows bilateral 

lower lobe collapse; Intensivist ivolved for possible bronchoscopy


CV:  stable, off pressors


: anurinc, dyalisis next week, increased BUN/Creatinine to 467/1.5


FEN: positive balance, stable lytes


GI:  no output from ileostomy, off tube feeding as she had high residuals 

yesterday, NGT output @ 1,000 mL; ileostomy dusky


Infectious:  on Zosyn (added yesterday) and Flagyl; patient pancultured 

yesterday, all cx negative so far, WBC elevated to 32.2 K from 27 K yesterday, 

no obvious source for leukocytosis identified; CT scan A/P shows minimal 

residual postop free air, no concerning fluid collection, distended stomach with

contrast flowing through most of small bowel, no concerning intraabdominal 

findings


Heme:  PLTS increased to 91K from 45K yesterday, H/H = 9/26.9, ovewerall 

slightly decreased 


Skin: multiple breakdowns in the lower abdominal skin and right groin








Plan:





Patient general conditions still not improving


Concerning elevation of WBC to 32K despite CT scan A/P benign findings


Ileostomy appears dusky with bloody output, necrotic exposed mucosa


Worsened kidney function, anuric





Plan second look laparotomy today following administration of 1 platelet 

pheresis unit to rule out small bowel ischemia





Procedure, risks, benefits, complications explained to the patient's son, his 

questions were answered, possibility of partial or complete small bowel necrosis

discussed with him, including the possibility that life support should be 

withdrawn should the small bowel be completely necrotic, versus the possibility 

that only portion of the small bowel or the ileostomy are compromised and that 

these can be easily resected and repaired.


Plan to give 1 pheresis platelet unit before surgery


T&C x 3 units of blood on hold

## 2019-02-17 NOTE — PDOC PROGRESS REPORT
Subjective


Progress Note for:: 02/17/19


Subjective:: 





No adverse events overnight.  Patient remains sedated and intubated.  Still no 

urine output.  Vital signs been stable.  White blood cell count has gone up.  

Surgery is planning on taking her back to the OR today for exploratory 

laparotomy to look for any devitalized tissue.


Reason For Visit: 


DIVERTICULITIS,SIGMOID AND DESCENDING COLON,INTRA-








Physical Exam


Vital Signs: 


                                        











Temp Pulse Resp BP Pulse Ox


 


 96.6 F L  102 H  9 L  128/69 H  98 


 


 02/17/19 16:13  02/17/19 16:00  02/17/19 16:13  02/17/19 16:13  02/17/19 16:13








                                 Intake & Output











 02/16/19 02/17/19 02/18/19





 06:59 06:59 06:59


 


Intake Total 2587 2996 7832


 


Output Total 3882 2658 5865


 


Balance -5130 776 5933


 


Weight 91.5 kg 93 kg 








General appearance: PRESENT: No acute distress, other - Sedated, intubated


Respiratory exam: PRESENT: crackles - Bilateral, symmetrical, unlabored.  

ABSENT: prolonged expiratory phase, rhonchi, tachypnea, wheezes


Cardiovascular exam: PRESENT: +S1, +S2, tachycardia


Vascular exam: ABSENT: normal capillary refill - Capillary refill was 6 seconds


GI/Abdominal exam: PRESENT: diminished bowel sounds, distended, soft, other - 

Midline abdominal decision was covered with a bulky dressing, she had 3 DAY 

drains.  ABSENT: rebound, tenderness


Extremities exam: PRESENT: pedal edema, other - She has 3+ or greater 

generalized pitting edema in her upper and lower extremities as well as diffuse 

anasarca.  She has a right IJ central line and a left groin trialysis catheter. 

ABSENT: clubbing


Musculoskeletal exam: ABSENT: deformity, tenderness


Neurological exam: PRESENT: other - Sedated, intubated


Skin exam: PRESENT: jaundice, mottled.  ABSENT: warm





Results


Laboratory Results: 


                                        





                                 02/17/19 05:55 





                                 02/17/19 05:55 





                                        











  02/15/19 02/17/19 02/17/19





  10:33 02:30 04:07


 


WBC   32.9 H* 


 


RBC   3.00 L 


 


Hgb   9.1 L 


 


Hct   26.9 L 


 


MCV   90 


 


MCH   30.4 


 


MCHC   33.9 


 


RDW   17.5 H 


 


Plt Count   85 L 


 


Seg Neutrophils %   


 


Lymphocytes %   


 


Monocytes %   


 


Eosinophils %   


 


Basophils %   


 


Absolute Neutrophils   


 


Absolute Lymphocytes   


 


Absolute Monocytes   


 


Absolute Eosinophils   


 


Absolute Basophils   


 


Carbonic Acid    1.04 L


 


HCO3/H2CO3 Ratio    20:1


 


ABG pH    7.41


 


ABG pCO2    34.4 L


 


ABG pO2    102.3 H


 


ABG HCO3    21.2


 


ABG O2 Saturation    97.8


 


ABG Base Excess    -2.9


 


FiO2    30%


 


Sodium   


 


Potassium   


 


Chloride   


 


Carbon Dioxide   


 


Anion Gap   


 


BUN   


 


Creatinine   


 


Est GFR ( Amer)   


 


Est GFR (Non-Af Amer)   


 


Glucose   


 


Calcium   


 


Magnesium   


 


Total Bilirubin   


 


AST   


 


ALT   


 


Alkaline Phosphatase   


 


Total Protein   


 


Albumin   


 


Blood Type  O POSITIVE  


 


Antibody Screen  NEGATIVE  














  02/17/19 02/17/19





  05:55 05:55


 


WBC  32.2 H* 


 


RBC  2.87 L 


 


Hgb  8.8 L 


 


Hct  26.0 L 


 


MCV  91 


 


MCH  30.5 


 


MCHC  33.7 


 


RDW  17.5 H 


 


Plt Count  91 L 


 


Seg Neutrophils %  Not Reportable 


 


Lymphocytes %  Not Reportable 


 


Monocytes %  Not Reportable 


 


Eosinophils %  Not Reportable 


 


Basophils %  Not Reportable 


 


Absolute Neutrophils  Not Reportable 


 


Absolute Lymphocytes  Not Reportable 


 


Absolute Monocytes  Not Reportable 


 


Absolute Eosinophils  Not Reportable 


 


Absolute Basophils  Not Reportable 


 


Carbonic Acid  


 


HCO3/H2CO3 Ratio  


 


ABG pH  


 


ABG pCO2  


 


ABG pO2  


 


ABG HCO3  


 


ABG O2 Saturation  


 


ABG Base Excess  


 


FiO2  


 


Sodium   136.1 L


 


Potassium   4.5


 


Chloride   104


 


Carbon Dioxide   23


 


Anion Gap   9


 


BUN   47 H


 


Creatinine   1.51 H


 


Est GFR ( Amer)   43 L


 


Est GFR (Non-Af Amer)   35 L


 


Glucose   207 H


 


Calcium   7.3 L


 


Magnesium   1.8


 


Total Bilirubin   3.4 H


 


AST   51 H


 


ALT   302 H


 


Alkaline Phosphatase   337 H


 


Total Protein   3.6 L


 


Albumin   2.0 L


 


Blood Type  


 


Antibody Screen  








                                        





02/11/19 14:28   Blood   Blood Culture - Final


                            Clostridium Sp.not Perfringens





                                        











  02/13/19 02/13/19





  06:04 06:04


 


Creatine Kinase  136 H 


 


CK-MB (CK-2)   3.43


 


Troponin I   0.033











Impressions: 


                                        





Abdomen Ultrasound  02/07/19 00:00


IMPRESSION:


 


No acute sonographic abnormality.


 








Catheter Patency X-Ray  02/08/19 00:00


IMPRESSION:  SMALL RESIDUAL ABSCESS CAVITY.  FISTULOUS COMMUNICATION WITH THE 

DISTAL DESCENDING COLON WITH COLONIC FINDINGS AS DESCRIBED ABOVE.


 








Fluoroscopy  02/08/19 00:00


IMPRESSION:  SMALL RESIDUAL ABSCESS CAVITY.  FISTULOUS COMMUNICATION WITH THE 

DISTAL DESCENDING COLON WITH COLONIC FINDINGS AS DESCRIBED ABOVE.


 








Abdomen/Pelvis CT  02/16/19 10:44


IMPRESSION:  Bilateral lower lobe collapse and consolidation with small pleural 

effusions


Small amount of postoperative free intraperitoneal air.  No intra-abdominal or 

pelvic masses or adenopathy.  No abscess.  Post subtotal colectomy with right 

lower quadrant ileostomy.


 








Chest CT  02/16/19 10:44


IMPRESSION:  Bilateral lower lobe collapse and consolidation with small pleural 

effusions


Small amount of postoperative free intraperitoneal air.  No intra-abdominal or 

pelvic masses or adenopathy.  No abscess.  Post subtotal colectomy with right 

lower quadrant ileostomy.


 








Chest X-Ray  02/17/19 06:00


IMPRESSION:


 


No significant change.


 














Assessment & Plan





- Diagnosis


(1) Septic shock


Is this a current diagnosis for this admission?: Yes   


Plan: 


She has evidence of multisystem organ failure, but there are some signs of 

improvement.  Her creatinine has improved and she has had improvement in her 

blood pressure.  She is on broad-spectrum antibiotic coverage for likely 

organisms.  Liver enzymes and bilirubin continue to trend down.  She is off the 

Cardizem drip and her heart rate is holding stable.  She got a couple more units

of blood yesterday.  Fluid from the DAY drains was negative for creatinine.  Felt

to be unlikely that she has a ureteral injury that is draining urine into the 

belly.  She is getting dialysis and is being followed by nephrology.





She had a CT of the chest abdomen and pelvis, which showed some pleural 

effusions, which are to be expected from her volume overload, but the 

possibility for pneumonia could not be ruled out.  At this point, she has good 

gram-negative coverage with Zosyn, and good anaerobic coverage with Flagyl.  Low

suspicion for the need for antifungals.  Therefore, if any further coverage 

would be needed, it would be gram-positive coverage.  There is nothing from her 

cultures that would indicate gram-positive organism.  A lot of what were seen in

her chest could be from fluid overload, but if we decide to treat for gram 

positives, there are multiple considerations here.  Ideally, we would use da

ptomycin, but daptomycin is not indicated for pneumonia.  We could use 

vancomycin, but given her renal issues, which could be made even worse by the 

fact that she is on Zosyn, this is not a completely attractive option.  The 

other option would be Zyvox, but with her platelets, and some of the oozing of 

blood she has had, this option is also not without risk.  We will hold off for 

now pending the outcome of her surgical procedure today.








(2) Colonic fistula


Is this a current diagnosis for this admission?: Yes   


Plan: 


Management per surgery.  She has a history of ulcerative colitis with a history 

of multiple complications as a result.  Surgery is planning on taking her back 

to the OR today for exploratory laparotomy as noted above.








- Time


Time Spent with patient: 25-34 minutes

## 2019-02-17 NOTE — PDOC PROGRESS REPORT
Subjective


Progress Note for:: 02/15/19


Subjective:: 





Intubated sedated


Reason For Visit: 


DIVERTICULITIS,SIGMOID AND DESCENDING COLON,INTRA-








Physical Exam


Vital Signs: 


                                        











Temp Pulse Resp BP Pulse Ox


 


 97.0 F   93   11 L  113/85   99 


 


 02/16/19 10:00  02/16/19 10:00  02/16/19 10:00  02/16/19 10:00  02/16/19 12:10








                                 Intake & Output











 02/15/19 02/16/19 02/17/19





 06:59 06:59 06:59


 


Intake Total 1587 2587 100


 


Output Total 928 3882 313


 


Balance 659 -1295 -213


 


Weight 93.4 kg 91.5 kg 











General appearance: PRESENT: no acute distress, disheveled, morbidly obese.  

ABSENT: cooperative


Head exam: PRESENT: atraumatic, normocephalic


Eye exam: PRESENT: conjunctiva pale.  ABSENT: EOMI


Mouth exam: PRESENT: dry mucosa, neck supple, tongue midline, other - ET tube


Neck exam: ABSENT: carotid bruit, full ROM, JVD, lymphadenopathy, meningismus, 

tenderness, thyromegaly, tracheal deviation, tracheostomy, other


Respiratory exam: PRESENT: decreased breath sounds, prolonged expiratory phas, 

rales, rhonchi, unlabored.  ABSENT: retraction, stridor


Cardiovascular exam: PRESENT: irregular rhythm, tachycardia


Pulses: PRESENT: normal radial pulses


GI/Abdominal exam: PRESENT: other - Status post surgery and multiple drains 

wound VAC


Gentrourinary exam: PRESENT: indwelling catheter


Extremities exam: PRESENT: +1 edema.  ABSENT: clubbing, joint swelling


Musculoskeletal exam: ABSENT: ambulatory, deformity, dislocation


Neurological exam: ABSENT: awake


Skin exam: PRESENT: dry, warm





Results


Laboratory Results: 


                                        





                                 02/16/19 04:40 





                                 02/16/19 04:40 





                                        











  02/15/19 02/15/19 02/16/19





  10:33 21:25 04:40


 


WBC   26.4 H 


 


RBC   3.40 L 


 


Hgb   10.3 L D 


 


Hct   29.9 L 


 


MCV   88 


 


MCH   30.3 


 


MCHC   34.4 


 


RDW   16.6 H 


 


Plt Count   38 L 


 


Seg Neutrophils %   


 


Lymphocytes %   


 


Monocytes %   


 


Eosinophils %   


 


Basophils %   


 


Absolute Neutrophils   


 


Absolute Lymphocytes   


 


Absolute Monocytes   


 


Absolute Eosinophils   


 


Absolute Basophils   


 


Carbonic Acid    1.14


 


HCO3/H2CO3 Ratio    21:1


 


ABG pH    7.44


 


ABG pCO2    38.0


 


ABG pO2    92.9


 


ABG HCO3    25.0 H


 


ABG O2 Saturation    97.4


 


ABG Base Excess    0.9


 


FiO2    30%


 


Sodium   


 


Potassium   


 


Chloride   


 


Carbon Dioxide   


 


Anion Gap   


 


BUN   


 


Creatinine   


 


Est GFR ( Amer)   


 


Est GFR (Non-Af Amer)   


 


Glucose   


 


Calcium   


 


Magnesium   


 


Total Bilirubin   


 


AST   


 


ALT   


 


Alkaline Phosphatase   


 


Total Protein   


 


Albumin   


 


Blood Type  O POSITIVE  


 


Antibody Screen  NEGATIVE  














  02/16/19 02/16/19





  04:40 04:40


 


WBC  27.5 H 


 


RBC  3.40 L 


 


Hgb  10.3 L 


 


Hct  29.9 L 


 


MCV  88 


 


MCH  30.2 


 


MCHC  34.3 


 


RDW  16.7 H 


 


Plt Count  40 L 


 


Seg Neutrophils %  Not Reportable 


 


Lymphocytes %  Not Reportable 


 


Monocytes %  Not Reportable 


 


Eosinophils %  Not Reportable 


 


Basophils %  Not Reportable 


 


Absolute Neutrophils  Not Reportable 


 


Absolute Lymphocytes  Not Reportable 


 


Absolute Monocytes  Not Reportable 


 


Absolute Eosinophils  Not Reportable 


 


Absolute Basophils  Not Reportable 


 


Carbonic Acid  


 


HCO3/H2CO3 Ratio  


 


ABG pH  


 


ABG pCO2  


 


ABG pO2  


 


ABG HCO3  


 


ABG O2 Saturation  


 


ABG Base Excess  


 


FiO2  


 


Sodium   138.2


 


Potassium   4.0


 


Chloride   105


 


Carbon Dioxide   25


 


Anion Gap   8


 


BUN   34 H


 


Creatinine   1.25


 


Est GFR ( Amer)   53 L


 


Est GFR (Non-Af Amer)   44 L


 


Glucose   187 H


 


Calcium   7.4 L


 


Magnesium   1.9


 


Total Bilirubin   3.4 H


 


AST   94 H


 


ALT   527 H


 


Alkaline Phosphatase   266 H


 


Total Protein   3.4 L


 


Albumin   2.1 L


 


Blood Type  


 


Antibody Screen  








                                        











  02/13/19 02/13/19





  06:04 06:04


 


Creatine Kinase  136 H 


 


CK-MB (CK-2)   3.43


 


Troponin I   0.033











Impressions: 


                                        





Abdomen/Pelvis CT  02/06/19 00:00


IMPRESSION:  Diverticulitis of the descending colon and sigmoid colon.  No 

definitive abscess or perforation.


Pigtail catheter in the lower abdomen.


 








Abdomen Ultrasound  02/07/19 00:00


IMPRESSION:


 


No acute sonographic abnormality.


 








Catheter Patency X-Ray  02/08/19 00:00


IMPRESSION:  SMALL RESIDUAL ABSCESS CAVITY.  FISTULOUS COMMUNICATION WITH THE 

DISTAL DESCENDING COLON WITH COLONIC FINDINGS AS DESCRIBED ABOVE.


 








Fluoroscopy  02/08/19 00:00


IMPRESSION:  SMALL RESIDUAL ABSCESS CAVITY.  FISTULOUS COMMUNICATION WITH THE 

DISTAL DESCENDING COLON WITH COLONIC FINDINGS AS DESCRIBED ABOVE.


 








Chest X-Ray  02/14/19 00:00


IMPRESSION:  Satisfactory position of support apparatus.  No pneumothorax.


 














Assessment & Plan





- Diagnosis


(1) Acute kidney failure with tubular necrosis


Is this a current diagnosis for this admission?: Yes   


Plan: 


hemodialysis








(2) Septic shock


Is this a current diagnosis for this admission?: Yes   


Plan: 


                              Labs- All tests 24 hr











  02/11/19 02/11/19 02/11/19





  13:10 13:32 21:45


 


WBC   20.0 H  32.4 H*


 


Hgb   10.1 L  9.2 L


 


Plt Count   344  279


 


ABG pH  7.17 L*  


 


ABG pCO2  48.2 H  


 


ABG pO2  150.8 H  


 


FiO2  40%  














  02/11/19 02/12/19 02/12/19





  21:53 03:54 03:54


 


WBC   23.6 H 


 


Hgb   8.8 L 


 


Plt Count   157 


 


ABG pH  7.10 L*   7.39


 


ABG pCO2  33.0 L   28.1 L


 


ABG pO2  481.2 H   149.5 H


 


FiO2  100%   40%














  02/12/19 02/12/19 02/12/19





  10:40 20:25 23:12


 


WBC   


 


Hgb   


 


Plt Count   


 


ABG pH  7.59 H  7.64 H*  7.47 H


 


ABG pCO2  22.7 L  22.6 L  32.3 L


 


ABG pO2  84.6  108.8 H  113.4 H


 


FiO2  30%  30%  30%














  02/12/19 02/13/19 02/13/19





  23:46 04:40 04:40


 


WBC  22.4 H  24.0 H 


 


Hgb  5.6 L D  7.8 L D 


 


Plt Count  79 L  69 L 


 


ABG pH    7.46 H


 


ABG pCO2    35.4


 


ABG pO2    98.8


 


FiO2    30%














  02/13/19





  14:10


 


WBC 


 


Hgb 


 


Plt Count 


 


ABG pH  7.45


 


ABG pCO2  34.3 L


 


ABG pO2  116.9 H


 


FiO2  30%








                                                                                











 02/11/19 18:30 Blood Culture - Preliminary





 Blood      Gram Positive Nito


 


 02/07/19 01:40 Gram Stain - Final





 Abdomen - Abscess Wound Culture - Final





      Escherichia Coli





      Enterococcus Faecalis(Group D)





      Prevotella Species





      Skin Monik


 


 02/06/19 16:00 Blood Culture - Final





 Blood      Clostridium Sp.not Perfringens





compensatory respiratory alkalosis








(3) Abdominal abscess


Is this a current diagnosis for this admission?: Yes   


Plan: 


Per surgery








- Time


Total Critical Time (Minutes): 50

## 2019-02-17 NOTE — RADIOLOGY REPORT (SQ)
EXAM DESCRIPTION:

XR CHEST 1 VIEW



COMPLETED DATE/TME:  02/17/2019 06:00



CLINICAL HISTORY:

58 years Female, sepsis



COMPARISON:

One day prior. 2/14/18.



NUMBER OF VIEWS/TECHNIQUE:

Bilateral lower thoracic opacity/effusion.  Adequate appearing

endotracheal tube.  Likely adequate appearing enteric tube

partially obscured. Adequate appearing right subclavian central

line.   



FINDINGS:



Clear lungs of adequate volume, and normal cardiac silhouette. 

Normal cardiac silhouette size.    No pneumothorax. Stable bony

thorax.



IMPRESSION:



No significant change.

## 2019-02-17 NOTE — PDOC PROGRESS REPORT
Subjective


Progress Note for:: 02/16/19


Subjective:: 





Intubated sedated


Reason For Visit: 


DIVERTICULITIS,SIGMOID AND DESCENDING COLON,INTRA-








Physical Exam


Vital Signs: 


                                        











Temp Pulse Resp BP Pulse Ox


 


 97.0 F   93   11 L  113/85   99 


 


 02/16/19 10:00  02/16/19 10:00  02/16/19 10:00  02/16/19 10:00  02/16/19 12:10








                                 Intake & Output











 02/15/19 02/16/19 02/17/19





 06:59 06:59 06:59


 


Intake Total 1587 2587 100


 


Output Total 928 3882 313


 


Balance 659 -1295 -213


 


Weight 93.4 kg 91.5 kg 











General appearance: PRESENT: no acute distress, disheveled, morbidly obese


Head exam: PRESENT: atraumatic, normocephalic


Eye exam: PRESENT: conjunctiva pale.  ABSENT: nystagmus


Mouth exam: PRESENT: dry mucosa, neck supple, tongue midline, other


Neck exam: ABSENT: carotid bruit, JVD, lymphadenopathy, thyromegaly, tracheal 

deviation, tracheostomy


Respiratory exam: PRESENT: decreased breath sounds, prolonged expiratory phas, 

rales, rhonchi, unlabored.  ABSENT: retraction, stridor


Cardiovascular exam: PRESENT: irregular rhythm


Pulses: PRESENT: normal radial pulses


GI/Abdominal exam: PRESENT: other - Status post surgery and multiple drains 

wound VAC


Gentrourinary exam: PRESENT: indwelling catheter


Extremities exam: PRESENT: +1 edema.  ABSENT: clubbing, joint swelling


Musculoskeletal exam: ABSENT: ambulatory, deformity, dislocation


Neurological exam: ABSENT: awake


Skin exam: PRESENT: dry, warm





Results


Laboratory Results: 


                                        





                                 02/16/19 04:40 





                                 02/16/19 04:40 





                                        











  02/15/19 02/15/19 02/16/19





  10:33 21:25 04:40


 


WBC   26.4 H 


 


RBC   3.40 L 


 


Hgb   10.3 L D 


 


Hct   29.9 L 


 


MCV   88 


 


MCH   30.3 


 


MCHC   34.4 


 


RDW   16.6 H 


 


Plt Count   38 L 


 


Seg Neutrophils %   


 


Lymphocytes %   


 


Monocytes %   


 


Eosinophils %   


 


Basophils %   


 


Absolute Neutrophils   


 


Absolute Lymphocytes   


 


Absolute Monocytes   


 


Absolute Eosinophils   


 


Absolute Basophils   


 


Carbonic Acid    1.14


 


HCO3/H2CO3 Ratio    21:1


 


ABG pH    7.44


 


ABG pCO2    38.0


 


ABG pO2    92.9


 


ABG HCO3    25.0 H


 


ABG O2 Saturation    97.4


 


ABG Base Excess    0.9


 


FiO2    30%


 


Sodium   


 


Potassium   


 


Chloride   


 


Carbon Dioxide   


 


Anion Gap   


 


BUN   


 


Creatinine   


 


Est GFR ( Amer)   


 


Est GFR (Non-Af Amer)   


 


Glucose   


 


Calcium   


 


Magnesium   


 


Total Bilirubin   


 


AST   


 


ALT   


 


Alkaline Phosphatase   


 


Total Protein   


 


Albumin   


 


Blood Type  O POSITIVE  


 


Antibody Screen  NEGATIVE  














  02/16/19 02/16/19





  04:40 04:40


 


WBC  27.5 H 


 


RBC  3.40 L 


 


Hgb  10.3 L 


 


Hct  29.9 L 


 


MCV  88 


 


MCH  30.2 


 


MCHC  34.3 


 


RDW  16.7 H 


 


Plt Count  40 L 


 


Seg Neutrophils %  Not Reportable 


 


Lymphocytes %  Not Reportable 


 


Monocytes %  Not Reportable 


 


Eosinophils %  Not Reportable 


 


Basophils %  Not Reportable 


 


Absolute Neutrophils  Not Reportable 


 


Absolute Lymphocytes  Not Reportable 


 


Absolute Monocytes  Not Reportable 


 


Absolute Eosinophils  Not Reportable 


 


Absolute Basophils  Not Reportable 


 


Carbonic Acid  


 


HCO3/H2CO3 Ratio  


 


ABG pH  


 


ABG pCO2  


 


ABG pO2  


 


ABG HCO3  


 


ABG O2 Saturation  


 


ABG Base Excess  


 


FiO2  


 


Sodium   138.2


 


Potassium   4.0


 


Chloride   105


 


Carbon Dioxide   25


 


Anion Gap   8


 


BUN   34 H


 


Creatinine   1.25


 


Est GFR ( Amer)   53 L


 


Est GFR (Non-Af Amer)   44 L


 


Glucose   187 H


 


Calcium   7.4 L


 


Magnesium   1.9


 


Total Bilirubin   3.4 H


 


AST   94 H


 


ALT   527 H


 


Alkaline Phosphatase   266 H


 


Total Protein   3.4 L


 


Albumin   2.1 L


 


Blood Type  


 


Antibody Screen  








                                        











  02/13/19 02/13/19





  06:04 06:04


 


Creatine Kinase  136 H 


 


CK-MB (CK-2)   3.43


 


Troponin I   0.033











Impressions: 


                                        





Abdomen/Pelvis CT  02/06/19 00:00


IMPRESSION:  Diverticulitis of the descending colon and sigmoid colon.  No 

definitive abscess or perforation.


Pigtail catheter in the lower abdomen.


 








Abdomen Ultrasound  02/07/19 00:00


IMPRESSION:


 


No acute sonographic abnormality.


 








Catheter Patency X-Ray  02/08/19 00:00


IMPRESSION:  SMALL RESIDUAL ABSCESS CAVITY.  FISTULOUS COMMUNICATION WITH THE 

DISTAL DESCENDING COLON WITH COLONIC FINDINGS AS DESCRIBED ABOVE.


 








Fluoroscopy  02/08/19 00:00


IMPRESSION:  SMALL RESIDUAL ABSCESS CAVITY.  FISTULOUS COMMUNICATION WITH THE 

DISTAL DESCENDING COLON WITH COLONIC FINDINGS AS DESCRIBED ABOVE.


 








Chest X-Ray  02/14/19 00:00


IMPRESSION:  Satisfactory position of support apparatus.  No pneumothorax.


 














Assessment & Plan





- Diagnosis


(1) Acute kidney failure with tubular necrosis


Is this a current diagnosis for this admission?: Yes   


Plan: 


hemodialysis








(2) Septic shock


Is this a current diagnosis for this admission?: Yes   


Plan: 


                              Labs- All tests 24 hr











  02/11/19 02/11/19 02/11/19





  13:10 13:32 21:45


 


WBC   20.0 H  32.4 H*


 


Hgb   10.1 L  9.2 L


 


Plt Count   344  279


 


ABG pH  7.17 L*  


 


ABG pCO2  48.2 H  


 


ABG pO2  150.8 H  


 


FiO2  40%  














  02/11/19 02/12/19 02/12/19





  21:53 03:54 03:54


 


WBC   23.6 H 


 


Hgb   8.8 L 


 


Plt Count   157 


 


ABG pH  7.10 L*   7.39


 


ABG pCO2  33.0 L   28.1 L


 


ABG pO2  481.2 H   149.5 H


 


FiO2  100%   40%














  02/12/19 02/12/19 02/12/19





  10:40 20:25 23:12


 


WBC   


 


Hgb   


 


Plt Count   


 


ABG pH  7.59 H  7.64 H*  7.47 H


 


ABG pCO2  22.7 L  22.6 L  32.3 L


 


ABG pO2  84.6  108.8 H  113.4 H


 


FiO2  30%  30%  30%














  02/12/19 02/13/19 02/13/19





  23:46 04:40 04:40


 


WBC  22.4 H  24.0 H 


 


Hgb  5.6 L D  7.8 L D 


 


Plt Count  79 L  69 L 


 


ABG pH    7.46 H


 


ABG pCO2    35.4


 


ABG pO2    98.8


 


FiO2    30%














  02/13/19





  14:10


 


WBC 


 


Hgb 


 


Plt Count 


 


ABG pH  7.45


 


ABG pCO2  34.3 L


 


ABG pO2  116.9 H


 


FiO2  30%








                                                                                











 02/11/19 18:30 Blood Culture - Preliminary





 Blood      Gram Positive Nito


 


 02/07/19 01:40 Gram Stain - Final





 Abdomen - Abscess Wound Culture - Final





      Escherichia Coli





      Enterococcus Faecalis(Group D)





      Prevotella Species





      Skin Monik


 


 02/06/19 16:00 Blood Culture - Final





 Blood      Clostridium Sp.not Perfringens





compensatory respiratory alkalosis








(3) Abdominal abscess


Is this a current diagnosis for this admission?: Yes   


Plan: 


Per surgery


                                                                                











 02/11/19 18:30 Blood Culture - Final





 Blood      Clostridium Sp.not Perfringens


 


 02/11/19 14:28 Blood Culture - Final





 Blood      Clostridium Sp.not Perfringens


 


 02/07/19 01:40 Gram Stain - Final





 Abdomen - Abscess Wound Culture - Final





      Escherichia Coli





      Enterococcus Faecalis(Group D)





      Prevotella Species





      Skin Monik


 


 02/06/19 16:00 Blood Culture - Final





 Blood      Clostridium Sp.not Perfringens














(4) Thrombocytosis


Is this a current diagnosis for this admission?: Yes   


Plan: 


                              Labs- All tests 24 hr











  02/11/19 02/12/19 02/12/19





  21:45 03:54 23:46


 


Plt Count  279  157  79 L














  02/13/19 02/13/19 02/14/19





  04:40 16:45 04:16


 


Plt Count  69 L  58 L  54 L














  02/15/19 02/15/19





  04:28 21:25


 


Plt Count  35 L  38 L

















- Time


Total Critical Time (Minutes): 50

## 2019-02-17 NOTE — PDOC PROGRESS REPORT
Subjective


Progress Note for:: 02/14/19


Subjective:: 





Intubated sedated


Reason For Visit: 


DIVERTICULITIS,SIGMOID AND DESCENDING COLON,INTRA-








Physical Exam


Vital Signs: 


                                        











Temp Pulse Resp BP Pulse Ox


 


 95.7 F L  88   9 L  131/79 H  99 


 


 02/14/19 10:00  02/14/19 10:00  02/14/19 10:00  02/14/19 10:00  02/14/19 10:00








                                 Intake & Output











 02/13/19 02/14/19 02/15/19





 06:59 06:59 06:59


 


Intake Total 6651 1331 


 


Output Total 1301 1468 185


 


Balance 5350 -137 -185


 


Weight 92.3 kg 93 kg 











General appearance: PRESENT: no acute distress, disheveled, morbidly obese.  

ABSENT: cooperative


Head exam: PRESENT: atraumatic, normocephalic


Eye exam: PRESENT: conjunctiva pale.  ABSENT: EOMI, nystagmus


Mouth exam: PRESENT: dry mucosa, neck supple, tongue midline, other - ET tube


Neck exam: ABSENT: carotid bruit, JVD, lymphadenopathy, thyromegaly, tracheal 

deviation, tracheostomy


Respiratory exam: PRESENT: decreased breath sounds, prolonged expiratory phas, 

rales, rhonchi, unlabored


Cardiovascular exam: PRESENT: irregular rhythm, tachycardia


Pulses: PRESENT: normal radial pulses


GI/Abdominal exam: PRESENT: other - Status post surgery and multiple drains 

wound VAC


Gentrourinary exam: PRESENT: indwelling catheter


Extremities exam: PRESENT: pedal edema.  ABSENT: calf tenderness, clubbing, 

joint swelling


Musculoskeletal exam: ABSENT: ambulatory, deformity, dislocation


Neurological exam: ABSENT: awake


Skin exam: PRESENT: dry, warm





Results


Laboratory Results: 


                                        





                                 02/14/19 04:16 





                                 02/14/19 04:16 





                                        











  02/13/19 02/13/19 02/13/19





  14:10 16:45 19:00


 


WBC   26.7 H 


 


RBC   2.90 L 


 


Hgb   9.0 L 


 


Hct   26.2 L 


 


MCV   90 


 


MCH   31.0 


 


MCHC   34.4 


 


RDW   15.7 H 


 


Plt Count   58 L 


 


Seg Neutrophils %   


 


Lymphocytes %   


 


Monocytes %   


 


Eosinophils %   


 


Basophils %   


 


Absolute Neutrophils   


 


Absolute Lymphocytes   


 


Absolute Monocytes   


 


Absolute Eosinophils   


 


Absolute Basophils   


 


Carbonic Acid  1.03 L  


 


HCO3/H2CO3 Ratio  22:1  


 


ABG pH  7.45  


 


ABG pCO2  34.3 L  


 


ABG pO2  116.9 H  


 


ABG HCO3  23.1  


 


ABG O2 Saturation  98.4 H  


 


ABG Base Excess  -0.6  


 


FiO2  30%  


 


Sodium    138.6


 


Potassium    3.7


 


Chloride    106


 


Carbon Dioxide    25


 


Anion Gap    8


 


BUN    18


 


Creatinine    0.94


 


Est GFR ( Amer)    > 60


 


Est GFR (Non-Af Amer)    > 60


 


Glucose    126 H


 


Lactic Acid   


 


Calcium    7.1 L


 


Phosphorus   


 


Magnesium   


 


Total Bilirubin   


 


AST   


 


ALT   


 


Alkaline Phosphatase   


 


Total Protein   


 


Albumin   


 


Prealbumin   














  02/14/19 02/14/19 02/14/19





  04:16 04:16 04:16


 


WBC   25.5 H 


 


RBC   2.80 L 


 


Hgb   8.7 L 


 


Hct   25.1 L 


 


MCV   90 


 


MCH   31.0 


 


MCHC   34.5 


 


RDW   15.9 H 


 


Plt Count   54 L 


 


Seg Neutrophils %   Not Reportable 


 


Lymphocytes %   Not Reportable 


 


Monocytes %   Not Reportable 


 


Eosinophils %   Not Reportable 


 


Basophils %   Not Reportable 


 


Absolute Neutrophils   Not Reportable 


 


Absolute Lymphocytes   Not Reportable 


 


Absolute Monocytes   Not Reportable 


 


Absolute Eosinophils   Not Reportable 


 


Absolute Basophils   Not Reportable 


 


Carbonic Acid  1.06  


 


HCO3/H2CO3 Ratio  21:1  


 


ABG pH  7.44  


 


ABG pCO2  35.2  


 


ABG pO2  113.2 H  


 


ABG HCO3  23.3  


 


ABG O2 Saturation  98.3 H  


 


ABG Base Excess  -0.6  


 


FiO2  30%  


 


Sodium    138.4


 


Potassium    3.6


 


Chloride    106


 


Carbon Dioxide    25


 


Anion Gap    7


 


BUN    22 H


 


Creatinine    1.19


 


Est GFR ( Amer)    56 L


 


Est GFR (Non-Af Amer)    47 L


 


Glucose    181 H


 


Lactic Acid   


 


Calcium    7.3 L


 


Phosphorus    2.5


 


Magnesium    1.9


 


Total Bilirubin    4.2 H


 


AST    619 H


 


ALT    1685 H


 


Alkaline Phosphatase    184 H


 


Total Protein    3.4 L


 


Albumin    2.0 L


 


Prealbumin    4.9 L














  02/14/19





  04:16


 


WBC 


 


RBC 


 


Hgb 


 


Hct 


 


MCV 


 


MCH 


 


MCHC 


 


RDW 


 


Plt Count 


 


Seg Neutrophils % 


 


Lymphocytes % 


 


Monocytes % 


 


Eosinophils % 


 


Basophils % 


 


Absolute Neutrophils 


 


Absolute Lymphocytes 


 


Absolute Monocytes 


 


Absolute Eosinophils 


 


Absolute Basophils 


 


Carbonic Acid 


 


HCO3/H2CO3 Ratio 


 


ABG pH 


 


ABG pCO2 


 


ABG pO2 


 


ABG HCO3 


 


ABG O2 Saturation 


 


ABG Base Excess 


 


FiO2 


 


Sodium 


 


Potassium 


 


Chloride 


 


Carbon Dioxide 


 


Anion Gap 


 


BUN 


 


Creatinine 


 


Est GFR ( Amer) 


 


Est GFR (Non-Af Amer) 


 


Glucose 


 


Lactic Acid  1.8


 


Calcium 


 


Phosphorus 


 


Magnesium 


 


Total Bilirubin 


 


AST 


 


ALT 


 


Alkaline Phosphatase 


 


Total Protein 


 


Albumin 


 


Prealbumin 








                                        











  02/13/19 02/13/19





  06:04 06:04


 


Creatine Kinase  136 H 


 


CK-MB (CK-2)   3.43


 


Troponin I   0.033











Impressions: 


                                        





Abdomen/Pelvis CT  02/06/19 00:00


IMPRESSION:  Diverticulitis of the descending colon and sigmoid colon.  No 

definitive abscess or perforation.


Pigtail catheter in the lower abdomen.


 








Abdomen Ultrasound  02/07/19 00:00


IMPRESSION:


 


No acute sonographic abnormality.


 








Catheter Patency X-Ray  02/08/19 00:00


IMPRESSION:  SMALL RESIDUAL ABSCESS CAVITY.  FISTULOUS COMMUNICATION WITH THE 

DISTAL DESCENDING COLON WITH COLONIC FINDINGS AS DESCRIBED ABOVE.


 








Fluoroscopy  02/08/19 00:00


IMPRESSION:  SMALL RESIDUAL ABSCESS CAVITY.  FISTULOUS COMMUNICATION WITH THE 

DISTAL DESCENDING COLON WITH COLONIC FINDINGS AS DESCRIBED ABOVE.


 








Chest X-Ray  02/13/19 06:00


IMPRESSION:


 


No significant change.


 














Assessment & Plan





- Diagnosis


(1) Acute kidney failure with tubular necrosis


Is this a current diagnosis for this admission?: Yes   


Plan: 


Moderate amount of time patient was hypertensive subsequently she has been all 

but  anuric renal consult is pending








(2) Septic shock


Is this a current diagnosis for this admission?: Yes   


Plan: 


                              Labs- All tests 24 hr











  02/11/19 02/11/19 02/11/19





  13:10 13:32 21:45


 


WBC   20.0 H  32.4 H*


 


Hgb   10.1 L  9.2 L


 


Plt Count   344  279


 


ABG pH  7.17 L*  


 


ABG pCO2  48.2 H  


 


ABG pO2  150.8 H  


 


FiO2  40%  














  02/11/19 02/12/19 02/12/19





  21:53 03:54 03:54


 


WBC   23.6 H 


 


Hgb   8.8 L 


 


Plt Count   157 


 


ABG pH  7.10 L*   7.39


 


ABG pCO2  33.0 L   28.1 L


 


ABG pO2  481.2 H   149.5 H


 


FiO2  100%   40%














  02/12/19 02/12/19 02/12/19





  10:40 20:25 23:12


 


WBC   


 


Hgb   


 


Plt Count   


 


ABG pH  7.59 H  7.64 H*  7.47 H


 


ABG pCO2  22.7 L  22.6 L  32.3 L


 


ABG pO2  84.6  108.8 H  113.4 H


 


FiO2  30%  30%  30%














  02/12/19 02/13/19 02/13/19





  23:46 04:40 04:40


 


WBC  22.4 H  24.0 H 


 


Hgb  5.6 L D  7.8 L D 


 


Plt Count  79 L  69 L 


 


ABG pH    7.46 H


 


ABG pCO2    35.4


 


ABG pO2    98.8


 


FiO2    30%














  02/13/19





  14:10


 


WBC 


 


Hgb 


 


Plt Count 


 


ABG pH  7.45


 


ABG pCO2  34.3 L


 


ABG pO2  116.9 H


 


FiO2  30%








                                                                                











 02/11/19 18:30 Blood Culture - Preliminary





 Blood      Gram Positive Nito


 


 02/07/19 01:40 Gram Stain - Final





 Abdomen - Abscess Wound Culture - Final





      Escherichia Coli





      Enterococcus Faecalis(Group D)





      Prevotella Species





      Skin Monik


 


 02/06/19 16:00 Blood Culture - Final





 Blood      Clostridium Sp.not Perfringens





compensatory respiratory alkalosis








(3) Abdominal abscess


Is this a current diagnosis for this admission?: Yes   


Plan: 


Per surgery








(4) Abscess of abdominal cavity


Is this a current diagnosis for this admission?: Yes   


Plan: 


As per surgery








- Time


Total Critical Time (Minutes): 45

## 2019-02-18 LAB
ABSOLUTE LYMPHOCYTES# (MANUAL): 6 10^3/UL (ref 0.5–4.7)
ABSOLUTE MONOCYTES # (MANUAL): 0.6 10^3/UL (ref 0.1–1.4)
ABSOLUTE NEUTROPHILS# (MANUAL): 25 10^3/UL (ref 1.7–8.2)
ADD MANUAL DIFF: YES
ALBUMIN SERPL-MCNC: 1.9 G/DL (ref 3.5–5)
ALP SERPL-CCNC: 191 U/L (ref 38–126)
ALT SERPL-CCNC: 173 U/L (ref 9–52)
ANION GAP SERPL CALC-SCNC: 10 MMOL/L (ref 5–19)
ANISOCYTOSIS BLD QL SMEAR: (no result)
ARTERIAL BLOOD FIO2: (no result)
ARTERIAL BLOOD H2CO3: 1.29 MMOL/L (ref 1.05–1.35)
ARTERIAL BLOOD HCO3: 22.5 MMOL/L (ref 20–24)
ARTERIAL BLOOD PCO2: 42.8 MMHG (ref 35–45)
ARTERIAL BLOOD PH: 7.34 (ref 7.35–7.45)
ARTERIAL BLOOD PO2: 94 MMHG (ref 80–100)
ARTERIAL BLOOD TOTAL CO2: 23.8 MMOL/L (ref 21–25)
AST SERPL-CCNC: 39 U/L (ref 14–36)
BASE EXCESS BLDA CALC-SCNC: -3.1 MMOL/L
BASO STIPL BLD QL SMEAR: PRESENT
BASOPHILS NFR BLD MANUAL: 0 % (ref 0–2)
BILIRUB DIRECT SERPL-MCNC: 2.5 MG/DL (ref 0–0.4)
BILIRUB SERPL-MCNC: 2.9 MG/DL (ref 0.2–1.3)
BUN SERPL-MCNC: 56 MG/DL (ref 7–20)
BURR CELLS BLD QL SMEAR: (no result)
CALCIUM: 7.3 MG/DL (ref 8.4–10.2)
CHLORIDE SERPL-SCNC: 104 MMOL/L (ref 98–107)
CO2 SERPL-SCNC: 23 MMOL/L (ref 22–30)
EOSINOPHIL NFR BLD MANUAL: 0 % (ref 0–6)
ERYTHROCYTE [DISTWIDTH] IN BLOOD BY AUTOMATED COUNT: 16.8 % (ref 11.5–14)
GLUCOSE SERPL-MCNC: 169 MG/DL (ref 75–110)
HCT VFR BLD CALC: 23.7 % (ref 36–47)
HGB BLD-MCNC: 8 G/DL (ref 12–15.5)
INR PPP: 1.22
MCH RBC QN AUTO: 30.6 PG (ref 27–33.4)
MCHC RBC AUTO-ENTMCNC: 33.7 G/DL (ref 32–36)
MCV RBC AUTO: 91 FL (ref 80–97)
MONOCYTES % (MANUAL): 2 % (ref 3–13)
NRBC BLD AUTO-RTO: 3 /100 WBC
OVALOCYTES BLD QL SMEAR: (no result)
PATH REV BLD -IMP: (no result)
PHOSPHATE SERPL-MCNC: 4 MG/DL (ref 2.5–4.5)
PLATELET # BLD: 100 10^3/UL (ref 150–450)
PLATELET COMMENT: ADEQUATE
POIKILOCYTOSIS BLD QL SMEAR: (no result)
POLYCHROMASIA BLD QL SMEAR: SLIGHT
POTASSIUM SERPL-SCNC: 5 MMOL/L (ref 3.6–5)
PREALB SERPL-MCNC: 14.6 MG/DL (ref 17.6–36)
PROT SERPL-MCNC: 3.6 G/DL (ref 6.3–8.2)
PROTHROMBIN TIME: 16 SEC (ref 11.4–15.4)
RBC # BLD AUTO: 2.61 10^6/UL (ref 3.72–5.28)
SAO2 % BLDA: 96.8 % (ref 94–98)
SCHISTOCYTES BLD QL SMEAR: (no result)
SEGMENTED NEUTROPHILS % (MAN): 79 % (ref 42–78)
SODIUM SERPL-SCNC: 136.7 MMOL/L (ref 137–145)
TOTAL CELLS COUNTED BLD: 100
TOXIC GRANULES BLD QL SMEAR: (no result)
VARIANT LYMPHS NFR BLD MANUAL: 19 % (ref 13–45)
WBC # BLD AUTO: 31.7 10^3/UL (ref 4–10.5)
WBC TOXIC VACUOLES BLD QL SMEAR: PRESENT

## 2019-02-18 PROCEDURE — 5A1D70Z PERFORMANCE OF URINARY FILTRATION, INTERMITTENT, LESS THAN 6 HOURS PER DAY: ICD-10-PCS | Performed by: SURGERY

## 2019-02-18 RX ADMIN — METRONIDAZOLE SCH MLS/HR: 500 INJECTION, SOLUTION INTRAVENOUS at 13:35

## 2019-02-18 RX ADMIN — SUCRALFATE SCH GM: 1 SUSPENSION ORAL at 17:26

## 2019-02-18 RX ADMIN — METRONIDAZOLE SCH MLS/HR: 500 INJECTION, SOLUTION INTRAVENOUS at 07:04

## 2019-02-18 RX ADMIN — HYDROCORTISONE SODIUM SUCCINATE SCH MG: 100 INJECTION, POWDER, FOR SOLUTION INTRAMUSCULAR; INTRAVENOUS at 10:35

## 2019-02-18 RX ADMIN — INSULIN HUMAN PRN UNIT: 100 INJECTION, SOLUTION PARENTERAL at 13:43

## 2019-02-18 RX ADMIN — PIPERACILLIN AND TAZOBACTAM SCH MLS/HR: 2; .25 INJECTION, POWDER, LYOPHILIZED, FOR SOLUTION INTRAVENOUS; PARENTERAL at 23:03

## 2019-02-18 RX ADMIN — Medication SCH: at 07:05

## 2019-02-18 RX ADMIN — PIPERACILLIN AND TAZOBACTAM SCH MLS/HR: 2; .25 INJECTION, POWDER, LYOPHILIZED, FOR SOLUTION INTRAVENOUS; PARENTERAL at 02:05

## 2019-02-18 RX ADMIN — ENOXAPARIN SODIUM SCH: 40 INJECTION SUBCUTANEOUS at 11:21

## 2019-02-18 RX ADMIN — SUCRALFATE SCH GM: 1 SUSPENSION ORAL at 13:35

## 2019-02-18 RX ADMIN — ACETYLCYSTEINE SCH MG: 200 INHALANT RESPIRATORY (INHALATION) at 08:21

## 2019-02-18 RX ADMIN — PANTOPRAZOLE SODIUM SCH MG: 40 INJECTION, POWDER, FOR SOLUTION INTRAVENOUS at 10:34

## 2019-02-18 RX ADMIN — Medication SCH: at 23:04

## 2019-02-18 RX ADMIN — IPRATROPIUM BROMIDE AND ALBUTEROL SULFATE SCH ML: 2.5; .5 SOLUTION RESPIRATORY (INHALATION) at 13:30

## 2019-02-18 RX ADMIN — Medication SCH: at 23:05

## 2019-02-18 RX ADMIN — LEUCINE, PHENYLALANINE, LYSINE, METHIONINE, ISOLEUCINE, VALINE, HISTIDINE, THREONINE, TRYPTOPHAN, ALANINE, GLYCINE, ARGININE, PROLINE, SERINE, TYROSINE, DEXTROSE PRN MLS/HR: 365; 280; 290; 200; 300; 290; 240; 210; 90; 1035; 515; 575; 340; 250; 20; 25 INJECTION INTRAVENOUS at 09:55

## 2019-02-18 RX ADMIN — INSULIN HUMAN PRN UNIT: 100 INJECTION, SOLUTION PARENTERAL at 18:58

## 2019-02-18 RX ADMIN — Medication SCH: at 16:24

## 2019-02-18 RX ADMIN — PANTOPRAZOLE SODIUM SCH MG: 40 INJECTION, POWDER, FOR SOLUTION INTRAVENOUS at 23:04

## 2019-02-18 RX ADMIN — LEUCINE, PHENYLALANINE, LYSINE, METHIONINE, ISOLEUCINE, VALINE, HISTIDINE, THREONINE, TRYPTOPHAN, ALANINE, GLYCINE, ARGININE, PROLINE, SERINE, TYROSINE, DEXTROSE PRN MLS/HR: 365; 280; 290; 200; 300; 290; 240; 210; 90; 1035; 515; 575; 340; 250; 20; 25 INJECTION INTRAVENOUS at 09:44

## 2019-02-18 RX ADMIN — PIPERACILLIN AND TAZOBACTAM SCH MLS/HR: 2; .25 INJECTION, POWDER, LYOPHILIZED, FOR SOLUTION INTRAVENOUS; PARENTERAL at 17:25

## 2019-02-18 RX ADMIN — Medication SCH: at 16:23

## 2019-02-18 RX ADMIN — PIPERACILLIN AND TAZOBACTAM SCH: 2; .25 INJECTION, POWDER, LYOPHILIZED, FOR SOLUTION INTRAVENOUS; PARENTERAL at 14:57

## 2019-02-18 RX ADMIN — IPRATROPIUM BROMIDE AND ALBUTEROL SULFATE SCH ML: 2.5; .5 SOLUTION RESPIRATORY (INHALATION) at 08:21

## 2019-02-18 RX ADMIN — SUCRALFATE SCH GM: 1 SUSPENSION ORAL at 07:03

## 2019-02-18 RX ADMIN — IPRATROPIUM BROMIDE AND ALBUTEROL SULFATE SCH ML: 2.5; .5 SOLUTION RESPIRATORY (INHALATION) at 20:53

## 2019-02-18 RX ADMIN — ACETYLCYSTEINE SCH MG: 200 INHALANT RESPIRATORY (INHALATION) at 20:54

## 2019-02-18 RX ADMIN — IPRATROPIUM BROMIDE AND ALBUTEROL SULFATE SCH ML: 2.5; .5 SOLUTION RESPIRATORY (INHALATION) at 02:46

## 2019-02-18 RX ADMIN — Medication SCH UNIT: at 07:04

## 2019-02-18 RX ADMIN — HYDROCORTISONE SODIUM SUCCINATE SCH MG: 100 INJECTION, POWDER, FOR SOLUTION INTRAMUSCULAR; INTRAVENOUS at 02:04

## 2019-02-18 RX ADMIN — HYDROCORTISONE SODIUM SUCCINATE SCH MG: 100 INJECTION, POWDER, FOR SOLUTION INTRAMUSCULAR; INTRAVENOUS at 17:26

## 2019-02-18 RX ADMIN — METRONIDAZOLE SCH MLS/HR: 500 INJECTION, SOLUTION INTRAVENOUS at 17:25

## 2019-02-18 RX ADMIN — NOREPINEPHRINE BITARTRATE PRN MLS/HR: 1 INJECTION, SOLUTION, CONCENTRATE INTRAVENOUS at 09:10

## 2019-02-18 NOTE — OPERATIVE REPORT E
Operative Report



NAME: JARVIS WELCH

MRN:  [id number]          : 1960 AGE:  58Y

DATE OF SURGERY: 2019                        ROOM: 608



PREOPERATIVE DIAGNOSES:

1.   NEED OF TRIALYSIS CATHETER FOR HEMODIALYSIS.

2.   REPLACEMENT OF LEFT SUBCLAVIAN CENTRAL VENOUS LINE.



POSTOPERATIVE DIAGNOSES:

1.   NEED OF TRIALYSIS CATHETER FOR HEMODIALYSIS.

2.   REPLACEMENT OF LEFT SUBCLAVIAN CENTRAL VENOUS LINE.



OPERATION:

1.   PLACEMENT OF LEFT GROIN FEMORAL VEIN TRIALYSIS CATHETER.

2.   REPLACEMENT OF RIGHT SUBCLAVIAN VEIN CENTRAL VENOUS LUMEN CATHETER OVER

     GUIDEWIRE.



SURGEON:

STEF CIFUENTES M.D.



ASSISTANT:

None.



ESTIMATED BLOOD LOSS:

Less than 10 mL.



COMPLICATIONS:

None.



ANESTHESIA:

10 mL of 1% lidocaine with epinephrine.



INDICATIONS/FINDINGS:

This 58-year-old female is postop day #3 following total colectomy with

Kavya pouch and ileostomy for ulcerative colitis with perforation and

abdominal abscess.  The patient is currently intubated and has developed

ATN, and is in need of placement of a dialysis catheter for hemodialysis. 

The nephrologist is recommending a trialysis catheter.  Initially, the

patient had a central venous line in the right subclavian vein which

requires to be replaced as it was inserted about 8 days ago.



PROCEDURE:

The procedure was done at the bedside in the ICU.  The patient was placed

in supine position.  The left groin was prepped and draped in usual

fashion.  Using ultrasound, the left common femoral vein was identified in

the groin crease.  The area was then prepped and draped in usual fashion. 

Infiltrated with lidocaine, and a 16 gauge needle easily used to cannulate

the left common femoral vein and good blood return was obtained.  The guidewire

was inserted through the needle into the femoral vein and the iliac vein. 

The needle was withdrawn.  The insertion point of the

catheter was enlarged with a #11 blade and tissue dilator which was removed and

a 30 cm long 20-Persian diameter trialysis catheter was inserted over the

guidewire into the common femoral vein and iliac vein.  The guidewire was

then removed.  Each port was then aspirated and flushed with normal saline

without difficulty.  The catheter was secured in place with sutures and

sterile dressings applied.  Following this, the right subclavian vein

central venous line catheter was replaced by prepping the catheter with

Betadine and laid on a sterile field made with cloth towels. 

Following this, a drape was placed on top of it, and the catheter was

placed through an opening on the drape itself.  The distal port was

then opened.  A guidewire was inserted through the distal port.  After

this, the catheter was withdrawn over the guidewire which

was then reinserted through the superior vena cava.  The new catheter was

inserted over the guidewire into the superior vena cava using the distal

port.  The guidewire was then removed.  Each port of the catheter was

aspirated and flushed with normal saline without difficulty.  The catheter

was secured to the skin with Nylon sutures, and sterile dressings were

applied.  The patient tolerated the procedure well.  A chest x-ray was

obtained to confirm position of the line.



DICTATING PHYSICIAN:  STEF CIFUENTES M.D.





[id number]                  DT: 2019    1712

PHY#: [id number]            DD: 2019    1705

ID:   [id number]           JOB#: [id number]       ACCT: [id number]



cc:STEF CIFUENTES M.D.

>







MTDD

## 2019-02-18 NOTE — PDOC PROGRESS REPORT
Subjective


Progress Note for:: 02/18/19


Subjective:: 





Intubated sedated


Reason For Visit: 


DIVERTICULITIS,SIGMOID AND DESCENDING COLON,INTRA-








Physical Exam


Vital Signs: 


                                        











Temp Pulse Resp BP Pulse Ox


 


 97.2 F   96   16   133/73 H  99 


 


 02/18/19 08:00  02/18/19 08:21  02/18/19 08:21  02/18/19 08:00  02/18/19 08:21








                                 Intake & Output











 02/17/19 02/18/19 02/19/19





 06:59 06:59 06:59


 


Intake Total 2996 8182 150


 


Output Total 2658 6130 10


 


Balance 338 2052 140


 


Weight 93 kg 94.3 kg 











General appearance: PRESENT: no acute distress, disheveled, morbidly obese.  

ABSENT: cooperative


Head exam: PRESENT: atraumatic, normocephalic


Eye exam: PRESENT: conjunctiva pale.  ABSENT: EOMI, nystagmus, scleral icterus


Mouth exam: PRESENT: dry mucosa, neck supple, tongue midline, other - ET tube


Neck exam: ABSENT: carotid bruit, JVD, lymphadenopathy, thyromegaly, tracheal 

deviation, tracheostomy


Respiratory exam: PRESENT: decreased breath sounds, prolonged expiratory phas, r

ales, rhonchi, unlabored.  ABSENT: retraction, stridor


Cardiovascular exam: PRESENT: RRR, +S1, +S2, tachycardia


Pulses: PRESENT: normal radial pulses


GI/Abdominal exam: PRESENT: other - status post surgery wound VAC drains


Gentrourinary exam: PRESENT: indwelling catheter


Extremities exam: PRESENT: pedal edema.  ABSENT: calf tenderness, clubbing, 

joint swelling


Musculoskeletal exam: ABSENT: deformity, dislocation


Neurological exam: ABSENT: awake


Skin exam: PRESENT: dry, warm, other - Diffuse subcutaneous ecchymosis





Results


Laboratory Results: 


                                        





                                 02/18/19 04:20 





                                 02/18/19 04:20 





                                        











  02/15/19 02/17/19 02/18/19





  10:33 19:20 04:20


 


WBC   28.1 H 


 


RBC   2.79 L 


 


Hgb   8.4 L 


 


Hct   25.3 L 


 


MCV   91 


 


MCH   30.3 


 


MCHC   33.4 


 


RDW   16.4 H 


 


Plt Count   92 L 


 


Seg Neutrophils %   Not Reportable 


 


Lymphocytes %   Not Reportable 


 


Monocytes %   Not Reportable 


 


Eosinophils %   Not Reportable 


 


Basophils %   Not Reportable 


 


Absolute Neutrophils   Not Reportable 


 


Absolute Lymphocytes   Not Reportable 


 


Absolute Monocytes   Not Reportable 


 


Absolute Eosinophils   Not Reportable 


 


Absolute Basophils   Not Reportable 


 


Carbonic Acid   


 


HCO3/H2CO3 Ratio   


 


ABG pH   


 


ABG pCO2   


 


ABG pO2   


 


ABG HCO3   


 


ABG O2 Saturation   


 


ABG Base Excess   


 


FiO2   


 


Sodium   


 


Potassium   


 


Chloride   


 


Carbon Dioxide   


 


Anion Gap   


 


BUN   


 


Creatinine   


 


Est GFR ( Amer)   


 


Est GFR (Non-Af Amer)   


 


Glucose   


 


Calcium   


 


Phosphorus   


 


Magnesium   


 


Total Bilirubin   


 


AST   


 


ALT   


 


Alkaline Phosphatase   


 


Ammonia    < 8.7 L


 


Total Protein   


 


Albumin   


 


Prealbumin   


 


Blood Type  O POSITIVE  


 


Antibody Screen  NEGATIVE  














  02/18/19 02/18/19 02/18/19





  04:20 04:20 04:20


 


WBC   31.7 H* 


 


RBC   2.61 L 


 


Hgb   8.0 L 


 


Hct   23.7 L 


 


MCV   91 


 


MCH   30.6 


 


MCHC   33.7 


 


RDW   16.8 H 


 


Plt Count   100 L 


 


Seg Neutrophils %   Not Reportable 


 


Lymphocytes %   Not Reportable 


 


Monocytes %   Not Reportable 


 


Eosinophils %   Not Reportable 


 


Basophils %   Not Reportable 


 


Absolute Neutrophils   Not Reportable 


 


Absolute Lymphocytes   Not Reportable 


 


Absolute Monocytes   Not Reportable 


 


Absolute Eosinophils   Not Reportable 


 


Absolute Basophils   Not Reportable 


 


Carbonic Acid  1.29  


 


HCO3/H2CO3 Ratio  17:1  


 


ABG pH  7.34 L  


 


ABG pCO2  42.8  


 


ABG pO2  94.0  


 


ABG HCO3  22.5  


 


ABG O2 Saturation  96.8  


 


ABG Base Excess  -3.1  


 


FiO2  28%  


 


Sodium    136.7 L


 


Potassium    5.0


 


Chloride    104


 


Carbon Dioxide    23


 


Anion Gap    10


 


BUN    56 H


 


Creatinine    1.87 H


 


Est GFR ( Amer)    33 L


 


Est GFR (Non-Af Amer)    28 L


 


Glucose    169 H


 


Calcium    7.3 L


 


Phosphorus    4.0


 


Magnesium    2.0


 


Total Bilirubin    2.9 H


 


AST    39 H


 


ALT    173 H


 


Alkaline Phosphatase    191 H


 


Ammonia   


 


Total Protein    3.6 L


 


Albumin    1.9 L


 


Prealbumin    14.6 L


 


Blood Type   


 


Antibody Screen   








                                        





02/16/19 11:40   Tracheal Aspirate   Gram Stain - Final





                                        











  02/13/19 02/13/19





  06:04 06:04


 


Creatine Kinase  136 H 


 


CK-MB (CK-2)   3.43


 


Troponin I   0.033











Impressions: 


                                        





Abdomen Ultrasound  02/07/19 00:00


IMPRESSION:


 


No acute sonographic abnormality.


 








Catheter Patency X-Ray  02/08/19 00:00


IMPRESSION:  SMALL RESIDUAL ABSCESS CAVITY.  FISTULOUS COMMUNICATION WITH THE 

DISTAL DESCENDING COLON WITH COLONIC FINDINGS AS DESCRIBED ABOVE.


 








Fluoroscopy  02/08/19 00:00


IMPRESSION:  SMALL RESIDUAL ABSCESS CAVITY.  FISTULOUS COMMUNICATION WITH THE 

DISTAL DESCENDING COLON WITH COLONIC FINDINGS AS DESCRIBED ABOVE.


 








Abdomen/Pelvis CT  02/16/19 10:44


IMPRESSION:  Bilateral lower lobe collapse and consolidation with small pleural 

effusions


Small amount of postoperative free intraperitoneal air.  No intra-abdominal or 

pelvic masses or adenopathy.  No abscess.  Post subtotal colectomy with right 

lower quadrant ileostomy.


 








Chest CT  02/16/19 10:44


IMPRESSION:  Bilateral lower lobe collapse and consolidation with small pleural 

effusions


Small amount of postoperative free intraperitoneal air.  No intra-abdominal or 

pelvic masses or adenopathy.  No abscess.  Post subtotal colectomy with right 

lower quadrant ileostomy.


 








Chest X-Ray  02/18/19 06:00


IMPRESSION:


 


No significant change.


 














Assessment & Plan





- Diagnosis


(1) Acute kidney failure with tubular necrosis


Is this a current diagnosis for this admission?: Yes   


Plan: 


hemodialysis








(2) Septic shock


Is this a current diagnosis for this admission?: Yes   


Plan: 


                              Labs- All tests 24 hr











  02/11/19 02/11/19 02/11/19





  13:10 13:32 21:45


 


WBC   20.0 H  32.4 H*


 


Hgb   10.1 L  9.2 L


 


Plt Count   344  279


 


ABG pH  7.17 L*  


 


ABG pCO2  48.2 H  


 


ABG pO2  150.8 H  


 


FiO2  40%  














  02/11/19 02/12/19 02/12/19





  21:53 03:54 03:54


 


WBC   23.6 H 


 


Hgb   8.8 L 


 


Plt Count   157 


 


ABG pH  7.10 L*   7.39


 


ABG pCO2  33.0 L   28.1 L


 


ABG pO2  481.2 H   149.5 H


 


FiO2  100%   40%














  02/12/19 02/12/19 02/12/19





  10:40 20:25 23:12


 


WBC   


 


Hgb   


 


Plt Count   


 


ABG pH  7.59 H  7.64 H*  7.47 H


 


ABG pCO2  22.7 L  22.6 L  32.3 L


 


ABG pO2  84.6  108.8 H  113.4 H


 


FiO2  30%  30%  30%














  02/12/19 02/13/19 02/13/19





  23:46 04:40 04:40


 


WBC  22.4 H  24.0 H 


 


Hgb  5.6 L D  7.8 L D 


 


Plt Count  79 L  69 L 


 


ABG pH    7.46 H


 


ABG pCO2    35.4


 


ABG pO2    98.8


 


FiO2    30%














  02/13/19





  14:10


 


WBC 


 


Hgb 


 


Plt Count 


 


ABG pH  7.45


 


ABG pCO2  34.3 L


 


ABG pO2  116.9 H


 


FiO2  30%








                                                                                











 02/11/19 18:30 Blood Culture - Preliminary





 Blood      Gram Positive Nito


 


 02/07/19 01:40 Gram Stain - Final





 Abdomen - Abscess Wound Culture - Final





      Escherichia Coli





      Enterococcus Faecalis(Group D)





      Prevotella Species





      Skin Monik


 


 02/06/19 16:00 Blood Culture - Final





 Blood      Clostridium Sp.not Perfringens





compensatory respiratory alkalosis








(3) Abdominal abscess


Is this a current diagnosis for this admission?: Yes   


Plan: 


Per surgery


                                                                                











 02/11/19 18:30 Blood Culture - Final





 Blood      Clostridium Sp.not Perfringens


 


 02/11/19 14:28 Blood Culture - Final





 Blood      Clostridium Sp.not Perfringens


 


 02/07/19 01:40 Gram Stain - Final





 Abdomen - Abscess Wound Culture - Final





      Escherichia Coli





      Enterococcus Faecalis(Group D)





      Prevotella Species





      Skin Monik


 


 02/06/19 16:00 Blood Culture - Final





 Blood      Clostridium Sp.not Perfringens














(4) Thrombocytopenia


Is this a current diagnosis for this admission?: Yes   


Plan: 


                              Labs- All tests 24 hr











  02/16/19 02/16/19 02/17/19





  04:40 14:00 02:30


 


Plt Count  40 L  45 L  85 L














  02/17/19





  05:55


 


Plt Count  91 L

















- Time


Total Critical Time (Minutes): 45

## 2019-02-18 NOTE — PDOC PROGRESS REPORT
Subjective


Progress Note for:: 02/18/19


Subjective:: 





2/18/2019-patient's overall condition is poor condition is critical. Remain 

intubated under sedation.  No urinary output.  She is getting dialysis today.  

Off sedation for several days.  Today is postop 7 after total colectomy.   

postop day 1 after exploratory laparotomy, omentectomy, abdominal wound 

debridement, revision of ileostomy.  A lot of drainage from the abdominal drain.

 Patient is edematous all over the body.  There is bruise along the right thigh.

 Family is aware of the poor and critical condition patient is in.  The surgeon 

requested for EEG, brain perfusion scan, apnea test today because patient does 

not show any spontaneous neurological activity and there is a concern about 

hypoxic anoxic brain injury,suffered  postoperatively, sepsis, hypotension on 

multiple pressors.


Reason For Visit: 


DIVERTICULITIS,SIGMOID AND DESCENDING COLON,INTRA-








Physical Exam


Vital Signs: 


                                        











Temp Pulse Resp BP Pulse Ox


 


 97.2 F   96   16   133/73 H  99 


 


 02/18/19 08:00  02/18/19 08:21  02/18/19 08:21  02/18/19 08:00  02/18/19 08:21








                                 Intake & Output











 02/17/19 02/18/19 02/19/19





 06:59 06:59 06:59


 


Intake Total 2996 8182 150


 


Output Total 2658 6130 10


 


Balance 338 2052 140


 


Weight 93 kg 94.3 kg 











General appearance: PRESENT: other - Patient is intubated under sedation 

responding to deep stimuli by moaning and slightly opening the eyes.


Head exam: PRESENT: atraumatic


Eye exam: PRESENT: PERRLA


Neck exam: ABSENT: carotid bruit, JVD, lymphadenopathy, thyromegaly


Respiratory exam: PRESENT: decreased breath sounds


Cardiovascular exam: PRESENT: tachycardia


GI/Abdominal exam: PRESENT: other - Creatinine is present at the surgical wound.

 Abdominal was edematous.  Bowel sounds are very very minimal.


Extremities exam: PRESENT: other - 2+ edema of the upper extremity and lower 

extremities and sacral dependent edema present.


Neurological exam: PRESENT: other - Patient still intubated under sedation 

unable to do neurological examination.


Psychiatric exam: PRESENT: other - Unable to psychiatric examination.





Results


Laboratory Results: 


                                        





                                 02/18/19 04:20 





                                 02/18/19 04:20 





                                        











  02/15/19 02/17/19 02/18/19





  10:33 19:20 04:20


 


WBC   28.1 H 


 


RBC   2.79 L 


 


Hgb   8.4 L 


 


Hct   25.3 L 


 


MCV   91 


 


MCH   30.3 


 


MCHC   33.4 


 


RDW   16.4 H 


 


Plt Count   92 L 


 


Seg Neutrophils %   Not Reportable 


 


Lymphocytes %   Not Reportable 


 


Monocytes %   Not Reportable 


 


Eosinophils %   Not Reportable 


 


Basophils %   Not Reportable 


 


Absolute Neutrophils   Not Reportable 


 


Absolute Lymphocytes   Not Reportable 


 


Absolute Monocytes   Not Reportable 


 


Absolute Eosinophils   Not Reportable 


 


Absolute Basophils   Not Reportable 


 


Carbonic Acid   


 


HCO3/H2CO3 Ratio   


 


ABG pH   


 


ABG pCO2   


 


ABG pO2   


 


ABG HCO3   


 


ABG O2 Saturation   


 


ABG Base Excess   


 


FiO2   


 


Sodium   


 


Potassium   


 


Chloride   


 


Carbon Dioxide   


 


Anion Gap   


 


BUN   


 


Creatinine   


 


Est GFR ( Amer)   


 


Est GFR (Non-Af Amer)   


 


Glucose   


 


Calcium   


 


Phosphorus   


 


Magnesium   


 


Total Bilirubin   


 


AST   


 


ALT   


 


Alkaline Phosphatase   


 


Ammonia    < 8.7 L


 


Total Protein   


 


Albumin   


 


Prealbumin   


 


Blood Type  O POSITIVE  


 


Antibody Screen  NEGATIVE  














  02/18/19 02/18/19 02/18/19





  04:20 04:20 04:20


 


WBC   31.7 H* 


 


RBC   2.61 L 


 


Hgb   8.0 L 


 


Hct   23.7 L 


 


MCV   91 


 


MCH   30.6 


 


MCHC   33.7 


 


RDW   16.8 H 


 


Plt Count   100 L 


 


Seg Neutrophils %   Not Reportable 


 


Lymphocytes %   Not Reportable 


 


Monocytes %   Not Reportable 


 


Eosinophils %   Not Reportable 


 


Basophils %   Not Reportable 


 


Absolute Neutrophils   Not Reportable 


 


Absolute Lymphocytes   Not Reportable 


 


Absolute Monocytes   Not Reportable 


 


Absolute Eosinophils   Not Reportable 


 


Absolute Basophils   Not Reportable 


 


Carbonic Acid  1.29  


 


HCO3/H2CO3 Ratio  17:1  


 


ABG pH  7.34 L  


 


ABG pCO2  42.8  


 


ABG pO2  94.0  


 


ABG HCO3  22.5  


 


ABG O2 Saturation  96.8  


 


ABG Base Excess  -3.1  


 


FiO2  28%  


 


Sodium    136.7 L


 


Potassium    5.0


 


Chloride    104


 


Carbon Dioxide    23


 


Anion Gap    10


 


BUN    56 H


 


Creatinine    1.87 H


 


Est GFR ( Amer)    33 L


 


Est GFR (Non-Af Amer)    28 L


 


Glucose    169 H


 


Calcium    7.3 L


 


Phosphorus    4.0


 


Magnesium    2.0


 


Total Bilirubin    2.9 H


 


AST    39 H


 


ALT    173 H


 


Alkaline Phosphatase    191 H


 


Ammonia   


 


Total Protein    3.6 L


 


Albumin    1.9 L


 


Prealbumin    14.6 L


 


Blood Type   


 


Antibody Screen   








                                        





02/16/19 11:40   Tracheal Aspirate   Gram Stain - Final





                                        











  02/13/19 02/13/19





  06:04 06:04


 


Creatine Kinase  136 H 


 


CK-MB (CK-2)   3.43


 


Troponin I   0.033











Impressions: 


                                        





Abdomen Ultrasound  02/07/19 00:00


IMPRESSION:


 


No acute sonographic abnormality.


 








Catheter Patency X-Ray  02/08/19 00:00


IMPRESSION:  SMALL RESIDUAL ABSCESS CAVITY.  FISTULOUS COMMUNICATION WITH THE 

DISTAL DESCENDING COLON WITH COLONIC FINDINGS AS DESCRIBED ABOVE.


 








Fluoroscopy  02/08/19 00:00


IMPRESSION:  SMALL RESIDUAL ABSCESS CAVITY.  FISTULOUS COMMUNICATION WITH THE 

DISTAL DESCENDING COLON WITH COLONIC FINDINGS AS DESCRIBED ABOVE.


 








Abdomen/Pelvis CT  02/16/19 10:44


IMPRESSION:  Bilateral lower lobe collapse and consolidation with small pleural 

effusions


Small amount of postoperative free intraperitoneal air.  No intra-abdominal or 

pelvic masses or adenopathy.  No abscess.  Post subtotal colectomy with right 

lower quadrant ileostomy.


 








Chest CT  02/16/19 10:44


IMPRESSION:  Bilateral lower lobe collapse and consolidation with small pleural 

effusions


Small amount of postoperative free intraperitoneal air.  No intra-abdominal or 

pelvic masses or adenopathy.  No abscess.  Post subtotal colectomy with right 

lower quadrant ileostomy.


 








Chest X-Ray  02/18/19 06:00


IMPRESSION:


 


No significant change.


 














Assessment & Plan





- Diagnosis


(1) Septic shock


Is this a current diagnosis for this admission?: Yes   


Plan: 


She has evidence of multisystem organ failure, but there are some signs of 

improvement.  Her creatinine has improved and she has had improvement in her 

blood pressure.  She is on broad-spectrum antibiotic coverage for likely org

anisms.  Liver enzymes and bilirubin continue to trend down.  She is off the 

Cardizem drip and her heart rate is holding stable.  She got a couple more units

of blood yesterday.  Fluid from the DAY drains was negative for creatinine.  Felt

to be unlikely that she has a ureteral injury that is draining urine into the 

belly.  She is getting dialysis and is being followed by nephrology.





She had a CT of the chest abdomen and pelvis, which showed some pleural 

effusions, which are to be expected from her volume overload, but the 

possibility for pneumonia could not be ruled out.  At this point, she has good 

gram-negative coverage with Zosyn, and good anaerobic coverage with Flagyl.  Low

suspicion for the need for antifungals.  Therefore, if any further coverage 

would be needed, it would be gram-positive coverage.  There is nothing from her 

cultures that would indicate gram-positive organism.  A lot of what were seen in

her chest could be from fluid overload, but if we decide to treat for gram 

positives, there are multiple considerations here.  Ideally, we would use 

daptomycin, but daptomycin is not indicated for pneumonia.  We could use 

vancomycin, but given her renal issues, which could be made even worse by the 

fact that she is on Zosyn, this is not a completely attractive option.  The 

other option would be Zyvox, but with her platelets, and some of the oozing of 

blood she has had, this option is also not without risk.  We will hold off for 

now pending the outcome of her surgical procedure today.








2/18/2019-overall prognosis is poor condition is critical patient is in septic 

shock.  The peritoneal abdominal tissue cultures are pending.  Chest x-ray shows

bilateral lower thoracic opacification/effusions.  The cultures from abdominal 

tissue on 216 shows gram-positive cocci in chains.  Cultures from 2/11/2019 

shows Clostridium.  Patient is presently on Flagyl and Zosyn.  T-max is 97.5.  

Patient is going for a brain scan today.  Plan is to continue the present 

management today.








(2) Acute kidney failure with tubular necrosis


Is this a current diagnosis for this admission?: Yes   


Plan: 


2/18/2019-patient developed acute kidney failure most likely secondary to septic

shock.  Patient had a dialysis on Friday she is going to have another session of

dialysis today.  Nephrology on board.  Overall prognosis is poor condition is 

critical.  There is no urinary output.








(3) Colonic fistula


Is this a current diagnosis for this admission?: Yes   


Plan: 


2/18/2019-patient has colonic fistula.  She has history of ulcerative colitis 

with multiple complications as a result result of it.  Patient went for 

exploratory laparotomy yesterday has a omentectomy, revision of ileostomy.  Plan

is as per surgical recommendations.








- Time


Time Spent with patient: 25-34 minutes


Medications reviewed and adjusted accordingly: Yes

## 2019-02-18 NOTE — OPERATIVE REPORT E
Operative Report



NAME: JARVIS WELCH

MRN:  [id number]          : 1960 AGE:  58Y

DATE OF SURGERY: 2019               ROOM: 608



PREOPERATIVE DIAGNOSES:

1.   STATUS POST TOTAL COLECTOMY WITH END ILEOSTOMY.

2.   SEPSIS.

3.   ACUTE RENAL FAILURE WITH ANURIA.

4.   RESPIRATORY FAILURE AND VENTILATOR DEPENDENCY.

5.   MULTIPLE CULTURES POSITIVE FOR Clostridia.

6.   LEUKOCYTOSIS, UNEXPLAINED.

7.   BILATERAL LOWER LOBE COLLAPSE.

8.   THE PATIENT ON HEMODIALYSIS.



POSTOPERATIVE DIAGNOSES:

1.   STATUS POST TOTAL COLECTOMY WITH END ILEOSTOMY.

2.   SEPSIS.

3.   ACUTE RENAL FAILURE WITH ANURIA.

4.   RESPIRATORY FAILURE AND VENTILATOR DEPENDENCY.

5.   MULTIPLE CULTURES POSITIVE FOR Clostridia.

6.   LEUKOCYTOSIS, UNEXPLAINED.

7.   BILATERAL LOWER LOBE COLLAPSE.

8.   THE PATIENT ON HEMODIALYSIS.



OPERATION:

1.   Exploratory laparotomy, omentectomy.

2.   Wide debridement of abdominal wall, cutaneous fat, and fascia, sharp, about


      25 cm in length x 1 cm in thickness.

3.   Revision of ileostomy.



SURGEON:

STEF CIFUENTES M.D.



ASSISTANT:

None.



BLEEDING:

Less than 50 mL.



ANESTHESIA:

General.



FLUIDS:

900 mL of crystalloid, 2 units of fresh frozen plasma, 1 unit of packed

red blood cells.



COMPLICATIONS:

None.



URINE OUTPUT:

None.



INDICATION AND FINDINGS:

This is a 58-year-old female 6 days postop following a total colectomy

with end ileostomy for a perforated sigmoid colon in a patient with

ulcerative colitis.  The patient was very sick in the immediate postop

period, requiring multiple pressors to keep her blood pressure in

acceptable level.  She has been intubated since the time of surgery. 

During the postop course, she has developed acute renal failure with

anuria, for which she is currently being dialyzed.  She has also developed

respiratory dependency and bilateral lower lobe collapse.  A CAT scan of

the abdomen and pelvis was done yesterday, revealing no fluid collection

in the abdominal cavity, very small amount of postoperative free air, no

abnormalities within the bowel.  A CT scan of the chest revealed bilateral

pleural effusion with lower lobe collapse.  Due to the climbing of the

white blood cell count, which was 27,000 yesterday and 32,000 today, ,

and no improvement of her general conditions, the decision was

made to take the patient to surgery to explore her abdomen and to also

address a possible ischemic end ileostomy.  Procedures, risks, benefits,

and complications were discussed with the patient's family, they understand

all the above, and they decided to proceed following their questions were 
answered.



DESCRIPTION OF PROCEDURE:

The procedure was done in the operating room.  The patient was transported

from the ICU, placed in a surgical bed.  She was already intubated.  The 2

Juan abdominal drains were removed.  A wound VAC was removed from the 

midline laparotomy incision.  The ileostomy

bag and flange were removed and the ileostomy was packed with a Ray-Tagged 
sponge,

which was sutured to the ileostomy with a figure-of-eight 2-0 silk suture.  

The abdominal wall was then prepped and draped in the usual

fashion.



The skin of the abdomen along the surgical incision was already open.  The

fascia was opened by dividing the PDS suture.  The peritoneal cavity was

entered.  Very small amount of ascites was noted.  The small bowel

appeared to be pale, but viable.  Examination of the remaining portion of

the abdomen revealed an ischemic omentum, which was divided almost up to

the level of the stomach, and a portion of the mesentery of the rectum was

found to be ischemic as well, and portions of the falciform ligament.  All

these were removed and sent to pathology as a single specimen.  Cultures

were obtained from the peritoneal fluid as well as from some tissue.  The

abdomen was then irrigated with about 6 L of normal saline, which was

completely aspirated, and throughout the case the appearance of the

intestine improved and a nice pink color was noted about up to 1 hour into

the procedure.  A full-thickness debridement of the 

skin of the surgery incision bilaterally together with fat and

fascia was performed with a knife first and then with Bovie so that

necrotic tissue and fat were removed.  This led to exposure of good subcutaneous

bleeding yellow fat.  This tissue was sent to pathology and some of this

tissue was sent with the culture as well.  The fascia was found to be

intact.  However, there were portions of the fat on both the right and

left side which appeared to be compromised, and those were excised with

Bovie.  After this was accomplished, the ileostomy was taken down.  The

tip was amputated and closed temporarily with an Allis clamp, and

delivered, again, through the old ileostomy site.  The terminal ileum was

then sutured with 2-0 Vycrol Lembert sutures to the  peritoneum of the anterior

abdominal wall to

prevent slippage of the end ileostomy tip, and good bleeding was obtained

from the end of the amputated end ileostomy.  The abdominal wall was then

closed with a running #1 looped PDS suture without difficulty.  The 

entire abdominal wall incision were covered with

sterile drapes.  The ileostomy was matured by suturing it to the edges of

the old ileostomy site, which was debrided because of necrosis of 

the medial aspect of the skin openin.  Due to the enlarged

ileostomy cutaneous opening, some of this was approximated with deep

inverted figure-of-eight interrupted 3-0 Vicryl sutures so to decrease the

size of the cutaneous opening.  The ileostomy was matured using 3-0 Vicryl

3-point sutures in a Claudia fashion, and good output of bilious material

was noted.  However, there was bleeding from the refreshed edges of the

ileostomy site, which was controlled by application of a piece of

Surgicel.  The refreshed abdominal wound was then covered with a wound VAC

sponge, large Tegaderm, and the end ileostomy flange and bag were then applied

on the ileostomy.  A small opening was made in the middle of the wound VAC

Tegaderm and a suction vent was placed to it and connected to the wound

VAC machine with a minus 125 mmHg negative pressure, with good seal. 

After this was accomplished, the drain which was previously placed into

the peritoneal cavity was secured to the skin with a 2-0 nylon suture

and connected to bulb suction.



The patient was then transferred to the ICU bed and to the ICU, and she

remained intubated in stable conditions.



DICTATING PHYSICIAN:  STEF CIFUENTES M.D.





[id number]                  DT: 2019    [id number]

PHY#: [id number]            DD: 2019    1522

ID:   [id number]           JOB#: [id number]       ACCT: [id number]



cc:STEF CIFUENTES M.D.

>







MTDD

## 2019-02-18 NOTE — RADIOLOGY REPORT (SQ)
EXAM DESCRIPTION:  CT HEAD WITHOUT



COMPLETED DATE/TIME:  2/18/2019 3:42 pm



REASON FOR STUDY:  r/o brain hypoxic injury



COMPARISON:  None.



TECHNIQUE:  Axial images acquired through the brain without intravenous contrast.  Images reviewed wi
th bone, brain and subdural windows.   Images stored on PACS.

All CT scanners at this facility use dose modulation, iterative reconstruction, and/or weight based d
osing when appropriate to reduce radiation dose to as low as reasonably achievable (ALARA).

[facility]: Dose Right  [facility]: CareDose    [facility]: Dose Right    [facility]: Teradose 4D    [facility]: Smart Technologies



RADIATION DOSE:  CT Rad equipment meets quality standard of care and radiation dose reduction techniq
ues were employed. CTDIvol: 48.6 mGy. DLP: 928 mGy-cm. mGy.



LIMITATIONS:  None.



FINDINGS:  VENTRICLES: Normal size and contour.

CEREBRUM: There is no evidence of diffuse cerebral edema.  Sulci and fissures are normal.  There is n
o hemorrhage.  There is no mass.  There is no midline shift. Few scattered areas of low density in th
e white matter most likely chronic small vessel ischemic changes.

CEREBELLUM: No masses.  No hemorrhage.  No alteration of density.  No evidence for acute infarction.

EXTRAAXIAL SPACES: No fluid collections.  No masses.

ORBITS AND GLOBE: No intra- or extraconal masses.  Normal contour of globe without masses.

CALVARIUM: No fracture.

PARANASAL SINUSES: No fluid or mucosal thickening.

SOFT TISSUES: Possible subcutaneous hematoma in the right temporal area.

OTHER: No other significant finding.



IMPRESSION:  Mild chronic microvascular ischemia.  No acute intracranial imaging findings.  Possible 
right subcutaneous hematoma.

EVIDENCE OF ACUTE STROKE: NO.



COMMENT:  Quality ID # [id number]: Final reports with documentation of one or more dose reduction techniques
 (e.g., Automated exposure control, adjustment of the mA and/or kV according to patient size, use of 
iterative reconstruction technique)



TECHNICAL DOCUMENTATION:  JOB ID:  [id number]

 2011 [employer]- All Rights Reserved



Reading location - IP/workstation name: [email or web address]

## 2019-02-18 NOTE — RADIOLOGY REPORT (SQ)
EXAM DESCRIPTION:

XR CHEST 1 VIEW



COMPLETED DATE/TME:  02/18/2019 06:00



CLINICAL HISTORY:

58 years Female, sepsis



COMPARISON:

One day prior.



NUMBER OF VIEWS/TECHNIQUE:

1/AP 



FINDINGS:



Bilateral lower thoracic opacity/effusion.  Adequate appearing

endotracheal tube.  Adequate appearing enteric tube with tip at

the left upper abdominal quadrant.   Adequate appearing right

subclavian central line.   Upper abdominal clips.  Normal cardiac

silhouette size.   No pneumothorax. Stable bony thorax.



IMPRESSION:



No significant change.

## 2019-02-18 NOTE — PDOC PROGRESS REPORT
Subjective


Progress Note for:: 02/18/19


Reason For Visit: 


Patient is in the ICU.  Chart review was done. Remains intubated and sedated.   

No urinary output.Patient was seen today while undergoing dialysis in the ICU 

today.  Off sedation for several days.  Today is postop 7 after total colectomy 

and postop day 1 after exploratory laparotomy, omentectomy, abdominal wound 

debridement, revision of ileostomy.  She looks quite moribund.  Lots of drain 

from her abdominal drains.  She is she is got generalized anasarca.  Vital signs

are tenuous with blood pressure dropping and she had to be begun on Levophed 

while undergoing dialysis.  Labs and medications were reviewed.








Physical Exam


Vital Signs: 


                                        











Temp Pulse Resp BP Pulse Ox


 


 97.2 F   113 H  14   97/72 L  100 


 


 02/18/19 08:00  02/18/19 10:00  02/18/19 10:00  02/18/19 10:00  02/18/19 10:00








                                 Intake & Output











 02/17/19 02/18/19 02/19/19





 06:59 06:59 06:59


 


Intake Total 2996 8182 1180


 


Output Total 2658 6130 1220


 


Balance 338 2052 -40


 


Weight 93 kg 94.3 kg 











Exam: 





Remains intubated and sedated.  Looks very moribund.


Respiratory exam: PRESENT: clear to auscultation umu, crackles, decreased breath

sounds


Cardiovascular exam: PRESENT: +S1, +S2


GI/Abdominal exam: PRESENT: soft.  ABSENT: mass - Ileostomy present.  She has 

got what looks like DAY drains., normal bowel sounds, tenderness


Neurological exam: PRESENT: altered


Skin exam: PRESENT: mottled.  ABSENT: cyanosis, rash





Results


Laboratory Results: 


                                        





                                 02/18/19 04:20 





                                 02/18/19 04:20 





                                        











  02/15/19 02/17/19 02/18/19





  10:33 19:20 04:20


 


WBC   28.1 H 


 


RBC   2.79 L 


 


Hgb   8.4 L 


 


Hct   25.3 L 


 


MCV   91 


 


MCH   30.3 


 


MCHC   33.4 


 


RDW   16.4 H 


 


Plt Count   92 L 


 


Seg Neutrophils %   Not Reportable 


 


Lymphocytes %   Not Reportable 


 


Monocytes %   Not Reportable 


 


Eosinophils %   Not Reportable 


 


Basophils %   Not Reportable 


 


Absolute Neutrophils   Not Reportable 


 


Absolute Lymphocytes   Not Reportable 


 


Absolute Monocytes   Not Reportable 


 


Absolute Eosinophils   Not Reportable 


 


Absolute Basophils   Not Reportable 


 


Carbonic Acid   


 


HCO3/H2CO3 Ratio   


 


ABG pH   


 


ABG pCO2   


 


ABG pO2   


 


ABG HCO3   


 


ABG O2 Saturation   


 


ABG Base Excess   


 


FiO2   


 


Sodium   


 


Potassium   


 


Chloride   


 


Carbon Dioxide   


 


Anion Gap   


 


BUN   


 


Creatinine   


 


Est GFR ( Amer)   


 


Est GFR (Non-Af Amer)   


 


Glucose   


 


Lactic Acid   


 


Calcium   


 


Phosphorus   


 


Magnesium   


 


Total Bilirubin   


 


AST   


 


ALT   


 


Alkaline Phosphatase   


 


Ammonia    < 8.7 L


 


Total Protein   


 


Albumin   


 


Prealbumin   


 


Blood Type  O POSITIVE  


 


Antibody Screen  NEGATIVE  














  02/18/19 02/18/19 02/18/19





  04:20 04:20 04:20


 


WBC   31.7 H* 


 


RBC   2.61 L 


 


Hgb   8.0 L 


 


Hct   23.7 L 


 


MCV   91 


 


MCH   30.6 


 


MCHC   33.7 


 


RDW   16.8 H 


 


Plt Count   100 L 


 


Seg Neutrophils %   Not Reportable 


 


Lymphocytes %   Not Reportable 


 


Monocytes %   Not Reportable 


 


Eosinophils %   Not Reportable 


 


Basophils %   Not Reportable 


 


Absolute Neutrophils   Not Reportable 


 


Absolute Lymphocytes   Not Reportable 


 


Absolute Monocytes   Not Reportable 


 


Absolute Eosinophils   Not Reportable 


 


Absolute Basophils   Not Reportable 


 


Carbonic Acid  1.29  


 


HCO3/H2CO3 Ratio  17:1  


 


ABG pH  7.34 L  


 


ABG pCO2  42.8  


 


ABG pO2  94.0  


 


ABG HCO3  22.5  


 


ABG O2 Saturation  96.8  


 


ABG Base Excess  -3.1  


 


FiO2  28%  


 


Sodium    136.7 L


 


Potassium    5.0


 


Chloride    104


 


Carbon Dioxide    23


 


Anion Gap    10


 


BUN    56 H


 


Creatinine    1.87 H


 


Est GFR ( Amer)    33 L


 


Est GFR (Non-Af Amer)    28 L


 


Glucose    169 H


 


Lactic Acid   


 


Calcium    7.3 L


 


Phosphorus    4.0


 


Magnesium    2.0


 


Total Bilirubin    2.9 H


 


AST    39 H


 


ALT    173 H


 


Alkaline Phosphatase    191 H


 


Ammonia   


 


Total Protein    3.6 L


 


Albumin    1.9 L


 


Prealbumin    14.6 L


 


Blood Type   


 


Antibody Screen   














  02/18/19





  12:25


 


WBC 


 


RBC 


 


Hgb 


 


Hct 


 


MCV 


 


MCH 


 


MCHC 


 


RDW 


 


Plt Count 


 


Seg Neutrophils % 


 


Lymphocytes % 


 


Monocytes % 


 


Eosinophils % 


 


Basophils % 


 


Absolute Neutrophils 


 


Absolute Lymphocytes 


 


Absolute Monocytes 


 


Absolute Eosinophils 


 


Absolute Basophils 


 


Carbonic Acid 


 


HCO3/H2CO3 Ratio 


 


ABG pH 


 


ABG pCO2 


 


ABG pO2 


 


ABG HCO3 


 


ABG O2 Saturation 


 


ABG Base Excess 


 


FiO2 


 


Sodium 


 


Potassium 


 


Chloride 


 


Carbon Dioxide 


 


Anion Gap 


 


BUN 


 


Creatinine 


 


Est GFR ( Amer) 


 


Est GFR (Non-Af Amer) 


 


Glucose 


 


Lactic Acid  1.8


 


Calcium 


 


Phosphorus 


 


Magnesium 


 


Total Bilirubin 


 


AST 


 


ALT 


 


Alkaline Phosphatase 


 


Ammonia 


 


Total Protein 


 


Albumin 


 


Prealbumin 


 


Blood Type 


 


Antibody Screen 








                                        





02/16/19 11:42   Abdomen - Incision Site   Gram Stain - Final


02/16/19 11:40   Tracheal Aspirate   Gram Stain - Final





                                        











  02/13/19 02/13/19





  06:04 06:04


 


Creatine Kinase  136 H 


 


CK-MB (CK-2)   3.43


 


Troponin I   0.033











Impressions: 


                                        





Abdomen Ultrasound  02/07/19 00:00


IMPRESSION:


 


No acute sonographic abnormality.


 








Catheter Patency X-Ray  02/08/19 00:00


IMPRESSION:  SMALL RESIDUAL ABSCESS CAVITY.  FISTULOUS COMMUNICATION WITH THE 

DISTAL DESCENDING COLON WITH COLONIC FINDINGS AS DESCRIBED ABOVE.


 








Fluoroscopy  02/08/19 00:00


IMPRESSION:  SMALL RESIDUAL ABSCESS CAVITY.  FISTULOUS COMMUNICATION WITH THE 

DISTAL DESCENDING COLON WITH COLONIC FINDINGS AS DESCRIBED ABOVE.


 








Abdomen/Pelvis CT  02/16/19 10:44


IMPRESSION:  Bilateral lower lobe collapse and consolidation with small pleural 

effusions


Small amount of postoperative free intraperitoneal air.  No intra-abdominal or 

pelvic masses or adenopathy.  No abscess.  Post subtotal colectomy with right 

lower quadrant ileostomy.


 








Chest CT  02/16/19 10:44


IMPRESSION:  Bilateral lower lobe collapse and consolidation with small pleural 

effusions


Small amount of postoperative free intraperitoneal air.  No intra-abdominal or 

pelvic masses or adenopathy.  No abscess.  Post subtotal colectomy with right 

lower quadrant ileostomy.


 








Chest X-Ray  02/18/19 06:00


IMPRESSION:


 


No significant change.


 














Assessment & Plan





- Diagnosis


(1) Acute kidney failure with tubular necrosis


Is this a current diagnosis for this admission?: Yes   


Plan: 


She is still anuric.She is dialysis dependent.  Is being supervised to ensure 

safe and smooth procedure.Fluid extraction has been difficult given her low 

blood pressure.  She was therefore initiated on Levophed.  Plan to remove 

approximately 1 L as tolerated.  Given  her overall comorbidities she is got a 

very poor prognosis.








(2) Diverticulitis large intestine


Qualifiers: 


   Diverticulitis bleeding: without bleeding   Diverticulitis complication: with

perforation and abscess   Qualified Code(s): K57.20 - Diverticulitis of large 

intestine with perforation and abscess without bleeding   


Is this a current diagnosis for this admission?: Yes   


Plan: 


Status post surgery.  Being managed by hospitalist and surgeons.








(3) Septic shock


Is this a current diagnosis for this admission?: Yes   


Plan: 


Initiating pressors again.  Overall poor prognosis.








(4) Abdominal abscess


Is this a current diagnosis for this admission?: Yes   


Plan: 


As per surgeons and hospitalist.

## 2019-02-18 NOTE — RADIOLOGY REPORT (SQ)
EXAM DESCRIPTION:  NM BRAIN STATIC WITH FLOW



COMPLETED DATE/TIME:  2/18/2019 4:55 pm



REASON FOR STUDY:  Evaluation for hypoxic brain injury



COMPARISON:  CT brain 2/18/2019



RADIONUCLIDE AND DOSE:  21.1 mCi of technetium 99 M DTPA



TECHNIQUE:  21.1 mCi of technetium 99 M DTPA was administered for evaluation of brain profusion.  Blo
od flow imaging, immediate post administration static images, 35 minutes delay static images were obt
ained.



LIMITATIONS:  None.



FINDINGS:  Absent cerebral perfusion.  There is activity over the facial soft tissues and neck.



IMPRESSION:  Absent cerebral hemisphere perfusion

 notified of results 1720 hours 2/18/2019



TECHNICAL DOCUMENTATION:  JOB ID:  [id number]

 2011 [employer]- All Rights Reserved



Reading location - IP/workstation name: [email or web address]

## 2019-02-19 VITALS — SYSTOLIC BLOOD PRESSURE: 121 MMHG | DIASTOLIC BLOOD PRESSURE: 70 MMHG

## 2019-02-19 LAB
ABSOLUTE LYMPHOCYTES# (MANUAL): 0.9 10^3/UL (ref 0.5–4.7)
ABSOLUTE MONOCYTES # (MANUAL): 2.7 10^3/UL (ref 0.1–1.4)
ABSOLUTE NEUTROPHILS# (MANUAL): 26.8 10^3/UL (ref 1.7–8.2)
ADD MANUAL DIFF: YES
ALBUMIN SERPL-MCNC: 1.6 G/DL (ref 3.5–5)
ALP SERPL-CCNC: 200 U/L (ref 38–126)
ALT SERPL-CCNC: 122 U/L (ref 9–52)
ANION GAP SERPL CALC-SCNC: 10 MMOL/L (ref 5–19)
ARTERIAL BLOOD FIO2: (no result)
ARTERIAL BLOOD H2CO3: 0.75 MMOL/L (ref 1.05–1.35)
ARTERIAL BLOOD HCO3: 21.2 MMOL/L (ref 20–24)
ARTERIAL BLOOD PCO2: 24.8 MMHG (ref 35–45)
ARTERIAL BLOOD PH: 7.55 (ref 7.35–7.45)
ARTERIAL BLOOD PO2: 83.3 MMHG (ref 80–100)
ARTERIAL BLOOD TOTAL CO2: 21.9 MMOL/L (ref 21–25)
AST SERPL-CCNC: 34 U/L (ref 14–36)
BASE EXCESS BLDA CALC-SCNC: -0.5 MMOL/L
BASOPHILS NFR BLD MANUAL: 0 % (ref 0–2)
BILIRUB DIRECT SERPL-MCNC: 2.3 MG/DL (ref 0–0.4)
BILIRUB SERPL-MCNC: 2.8 MG/DL (ref 0.2–1.3)
BUN SERPL-MCNC: 43 MG/DL (ref 7–20)
CALCIUM: 7.2 MG/DL (ref 8.4–10.2)
CHLORIDE SERPL-SCNC: 102 MMOL/L (ref 98–107)
CO2 SERPL-SCNC: 23 MMOL/L (ref 22–30)
EOSINOPHIL NFR BLD MANUAL: 0 % (ref 0–6)
ERYTHROCYTE [DISTWIDTH] IN BLOOD BY AUTOMATED COUNT: 15.8 % (ref 11.5–14)
GLUCOSE SERPL-MCNC: 226 MG/DL (ref 75–110)
HCT VFR BLD CALC: 25.9 % (ref 36–47)
HGB BLD-MCNC: 8.6 G/DL (ref 12–15.5)
MCH RBC QN AUTO: 30.2 PG (ref 27–33.4)
MCHC RBC AUTO-ENTMCNC: 33.3 G/DL (ref 32–36)
MCV RBC AUTO: 91 FL (ref 80–97)
MONOCYTES % (MANUAL): 9 % (ref 3–13)
NEUTS BAND NFR BLD MANUAL: 2 % (ref 3–5)
NRBC BLD AUTO-RTO: 5 /100 WBC
PLATELET # BLD: 91 10^3/UL (ref 150–450)
PLATELET COMMENT: ADEQUATE
PLATELET LARGE: PRESENT
POIKILOCYTOSIS BLD QL SMEAR: (no result)
POLYCHROMASIA BLD QL SMEAR: (no result)
POTASSIUM SERPL-SCNC: 3.8 MMOL/L (ref 3.6–5)
PROT SERPL-MCNC: 3.2 G/DL (ref 6.3–8.2)
RBC # BLD AUTO: 2.85 10^6/UL (ref 3.72–5.28)
SAO2 % BLDA: 97.5 % (ref 94–98)
SEGMENTED NEUTROPHILS % (MAN): 86 % (ref 42–78)
SODIUM SERPL-SCNC: 135 MMOL/L (ref 137–145)
STOMATOCYTES BLD QL SMEAR: (no result)
TOTAL CELLS COUNTED BLD: 100
TRIGL SERPL-MCNC: 151 MG/DL (ref ?–150)
VARIANT LYMPHS NFR BLD MANUAL: 3 % (ref 13–45)
WBC # BLD AUTO: 30.5 10^3/UL (ref 4–10.5)

## 2019-02-19 RX ADMIN — IPRATROPIUM BROMIDE AND ALBUTEROL SULFATE SCH ML: 2.5; .5 SOLUTION RESPIRATORY (INHALATION) at 07:52

## 2019-02-19 RX ADMIN — INSULIN HUMAN PRN UNIT: 100 INJECTION, SOLUTION PARENTERAL at 06:34

## 2019-02-19 RX ADMIN — Medication SCH: at 06:36

## 2019-02-19 RX ADMIN — PIPERACILLIN AND TAZOBACTAM SCH MLS/HR: 2; .25 INJECTION, POWDER, LYOPHILIZED, FOR SOLUTION INTRAVENOUS; PARENTERAL at 03:21

## 2019-02-19 RX ADMIN — MORPHINE SULFATE PRN MG: 10 INJECTION INTRAMUSCULAR; INTRAVENOUS; SUBCUTANEOUS at 09:42

## 2019-02-19 RX ADMIN — IPRATROPIUM BROMIDE AND ALBUTEROL SULFATE SCH ML: 2.5; .5 SOLUTION RESPIRATORY (INHALATION) at 02:23

## 2019-02-19 RX ADMIN — SUCRALFATE SCH: 1 SUSPENSION ORAL at 06:36

## 2019-02-19 RX ADMIN — HYDROCORTISONE SODIUM SUCCINATE SCH MG: 100 INJECTION, POWDER, FOR SOLUTION INTRAMUSCULAR; INTRAVENOUS at 01:18

## 2019-02-19 RX ADMIN — METRONIDAZOLE SCH MLS/HR: 500 INJECTION, SOLUTION INTRAVENOUS at 01:18

## 2019-02-19 RX ADMIN — SUCRALFATE SCH GM: 1 SUSPENSION ORAL at 01:18

## 2019-02-19 RX ADMIN — ACETYLCYSTEINE SCH MG: 200 INHALANT RESPIRATORY (INHALATION) at 07:51

## 2019-02-19 RX ADMIN — MORPHINE SULFATE PRN MG: 10 INJECTION INTRAMUSCULAR; INTRAVENOUS; SUBCUTANEOUS at 08:47

## 2019-02-19 RX ADMIN — METRONIDAZOLE SCH MLS/HR: 500 INJECTION, SOLUTION INTRAVENOUS at 06:33

## 2019-02-19 NOTE — PROGRESS NOTE
Provider Note


Provider Note: 





General Surgrey








1) Patient underwent apnea test x 2 yesterday which was positive (patient 

rewmained apneic after discontinuation of ventilator support x 1 full minute 

each time)


2) Brain scan test is negative for brain perfusion


3) Patient continues to remain motionless and unresponsive to verba or physiocal

stimuli, despite having been off sedation x 5 days





I had al long discussion with the patient family, they unrstand the situation 

and have decided to proceed with terminal extubation sometimes today once all 

family members are gathered with the patient.

## 2019-02-19 NOTE — DEATH SUMMARY
Death Summary


Date : 19


Time of Death:: 13:05


Autopsy: No


Resuscitation Status: Comfort Measures Only





- Final Diagnosis


(1) Septic shock


Is this a current diagnosis for this admission?: Yes   





(2) Acute kidney failure with tubular necrosis


Is this a current diagnosis for this admission?: Yes   





(3) Colonic fistula


Is this a current diagnosis for this admission?: Yes   


Hospital Course:: 





58-year-old female with known history of ulcerative colitis and had percutaneous

drainage of the intra-abdominal pelvic abscess on 1/10/2019, seen in the 

surgical clinic with complaints of lower abdominal pains, sent to the ER because

drain appears to be clogged and patient may be developing new abscess.  Patient 

was taken to the OR 60 cc of purulent material was removed, follow-up CT scan 

shows no abscess but sigmoid and descending colon diverticulitis for these 

reasons patient was admitted on 2019 by the surgical team.  Medical consult 

was called on 2019 after patient went to the operating room for subtotal 

colectomy with colostomy.  Patient got into septic shock and transferred to ICU.

 During the hospital course patient was intubated under sedation unable to 

extubate the patient.  Patient is also found to be in septic shock, started on 

IV antibiotic therapy and IV pressors.  Patient also developed acute kidney 

injury with tubular necrosis started on dialysis.  For the last several days 

patient is off the sedation but still intubated with no response.  Brain scan 

was done yesterday found to have a low cerebral perfusion.  Apnea test was done 

twice a day before on patient failed dose apnea test.  This morning I spoke to 

the son because of the poor prognosis overall critical condition he wants all 

the measures will be discontinued except for comfort care.  As per his 

recommendations patient was extubated and she peacefully passed away around 1305

on 2019.  No autopsy was requested by the family members.  The certificate 

was signed and body is going to be released to  home.

## 2019-02-19 NOTE — PDOC PROGRESS REPORT
Subjective


Progress Note for:: 02/19/19


Subjective:: 





2/18/2019-patient's overall condition is poor condition is critical. Remain 

intubated under sedation.  No urinary output.  She is getting dialysis today.  

Off sedation for several days.  Today is postop 7 after total colectomy.   

postop day 1 after exploratory laparotomy, omentectomy, abdominal wound 

debridement, revision of ileostomy.  A lot of drainage from the abdominal drain.

 Patient is edematous all over the body.  There is bruise along the right thigh.

 Family is aware of the poor and critical condition patient is in.  The surgeon 

requested for EEG, brain perfusion scan, apnea test today because patient does 

not show any spontaneous neurological activity and there is a concern about 

hypoxic anoxic brain injury,suffered  postoperatively, sepsis, hypotension on 

multiple pressors.








2/19/2019-brain scan was done yesterday there is no cerebral perfusion.  Apnea 

test was done twice yesterday and it it shows no spontaneous breathing.  Had a 

long discussion with the family members this morning -son decided to request for

comfort care measures only.  He understood that comfort care measures means 

patient only receive Ativan IV and morphine for anxiety and pain management.  He

wants patient to be extubated today and is aware of the fact that she may not 

able to survive the process.  She is going to be DNR/DNI.  Dr. Dela Cruz is 

notified.


Reason For Visit: 


DIVERTICULITIS,SIGMOID AND DESCENDING COLON,INTRA-








Physical Exam


Vital Signs: 


                                        











Temp Pulse Resp BP Pulse Ox


 


 97.0 F   93   20   135/75 H  100 


 


 02/19/19 08:42  02/19/19 08:42  02/19/19 08:42  02/19/19 08:42  02/19/19 08:42








                                 Intake & Output











 02/18/19 02/19/19 02/20/19





 06:59 06:59 06:59


 


Intake Total 8182 2416 


 


Output Total 6130 4456 


 


Balance 2052 550 


 


Weight 94.3 kg 92.4 kg 











General appearance: PRESENT: other - Patient is intubated not under sedation.  

Unresponsive.


Eye exam: PRESENT: PERRLA


Mouth exam: PRESENT: moist


Neck exam: ABSENT: carotid bruit, JVD, lymphadenopathy, thyromegaly


Respiratory exam: PRESENT: clear to auscultation umu.  ABSENT: rales, rhonchi, 

wheezes


Cardiovascular exam: PRESENT: RRR.  ABSENT: diastolic murmur, rubs, systolic 

murmur


GI/Abdominal exam: PRESENT: normal bowel sounds, soft.  ABSENT: distended, 

guarding, mass, organolmegaly, rebound, tenderness


Neurological exam: PRESENT: other - Brain scan shows anoxic encephalopathy.





Results


Laboratory Results: 


                                        





                                 02/19/19 04:30 





                                 02/19/19 04:30 





                                        











  02/15/19 02/18/19 02/18/19





  10:33 12:25 12:37


 


WBC   


 


RBC   


 


Hgb   


 


Hct   


 


MCV   


 


MCH   


 


MCHC   


 


RDW   


 


Plt Count   


 


Seg Neutrophils %   


 


Lymphocytes %   


 


Monocytes %   


 


Eosinophils %   


 


Basophils %   


 


Absolute Neutrophils   


 


Absolute Lymphocytes   


 


Absolute Monocytes   


 


Absolute Eosinophils   


 


Absolute Basophils   


 


Carbonic Acid   


 


HCO3/H2CO3 Ratio   


 


ABG pH   


 


ABG pCO2   


 


ABG pO2   


 


ABG HCO3   


 


ABG O2 Saturation   


 


ABG Base Excess   


 


FiO2   


 


Sodium   


 


Potassium   


 


Chloride   


 


Carbon Dioxide   


 


Anion Gap   


 


BUN   


 


Creatinine   


 


Est GFR ( Amer)   


 


Est GFR (Non-Af Amer)   


 


Glucose   


 


Lactic Acid   1.8 


 


Calcium   


 


Magnesium   


 


Total Bilirubin   


 


AST   


 


ALT   


 


Alkaline Phosphatase   


 


Total Protein   


 


Albumin   


 


Triglycerides   


 


Blood Type  O POSITIVE   O POSITIVE


 


Antibody Screen  NEGATIVE   NEGATIVE














  02/19/19 02/19/19 02/19/19





  04:30 04:30 04:30


 


WBC   30.5 H* 


 


RBC   2.85 L 


 


Hgb   8.6 L 


 


Hct   25.9 L 


 


MCV   91 


 


MCH   30.2 


 


MCHC   33.3 


 


RDW   15.8 H 


 


Plt Count   91 L 


 


Seg Neutrophils %   Not Reportable 


 


Lymphocytes %   Not Reportable 


 


Monocytes %   Not Reportable 


 


Eosinophils %   Not Reportable 


 


Basophils %   Not Reportable 


 


Absolute Neutrophils   Not Reportable 


 


Absolute Lymphocytes   Not Reportable 


 


Absolute Monocytes   Not Reportable 


 


Absolute Eosinophils   Not Reportable 


 


Absolute Basophils   Not Reportable 


 


Carbonic Acid  0.75 L  


 


HCO3/H2CO3 Ratio  28:1  


 


ABG pH  7.55 H  


 


ABG pCO2  24.8 L  


 


ABG pO2  83.3  


 


ABG HCO3  21.2  


 


ABG O2 Saturation  97.5  


 


ABG Base Excess  -0.5  


 


FiO2  28%  


 


Sodium    135.0 L


 


Potassium    3.8


 


Chloride    102


 


Carbon Dioxide    23


 


Anion Gap    10


 


BUN    43 H


 


Creatinine    1.42 H


 


Est GFR ( Amer)    46 L


 


Est GFR (Non-Af Amer)    38 L


 


Glucose    226 H


 


Lactic Acid   


 


Calcium    7.2 L


 


Magnesium    2.0


 


Total Bilirubin    2.8 H


 


AST    34


 


ALT    122 H


 


Alkaline Phosphatase    200 H


 


Total Protein    3.2 L


 


Albumin    1.6 L


 


Triglycerides    151 H


 


Blood Type   


 


Antibody Screen   








                                        





02/16/19 11:42   Abdomen - Incision Site   Gram Stain - Final


02/16/19 11:42   Abdomen - Incision Site   Wound Culture - Final


                            Enterococcus Faecalis(Group D)


                            Yeast, Not Candida Albicans


                            No Anaerobic Organisms


02/16/19 11:40   Tracheal Aspirate   Gram Stain - Final


02/16/19 11:50   Whiteside Catheter   Urine Culture - Final


                            Yeast, Not Candida Albicans





                                        











  02/13/19 02/13/19





  06:04 06:04


 


Creatine Kinase  136 H 


 


CK-MB (CK-2)   3.43


 


Troponin I   0.033











Impressions: 


                                        





Abdomen Ultrasound  02/07/19 00:00


IMPRESSION:


 


No acute sonographic abnormality.


 








Catheter Patency X-Ray  02/08/19 00:00


IMPRESSION:  SMALL RESIDUAL ABSCESS CAVITY.  FISTULOUS COMMUNICATION WITH THE 

DISTAL DESCENDING COLON WITH COLONIC FINDINGS AS DESCRIBED ABOVE.


 








Fluoroscopy  02/08/19 00:00


IMPRESSION:  SMALL RESIDUAL ABSCESS CAVITY.  FISTULOUS COMMUNICATION WITH THE 

DISTAL DESCENDING COLON WITH COLONIC FINDINGS AS DESCRIBED ABOVE.


 








Abdomen/Pelvis CT  02/16/19 10:44


IMPRESSION:  Bilateral lower lobe collapse and consolidation with small pleural 

effusions


Small amount of postoperative free intraperitoneal air.  No intra-abdominal or 

pelvic masses or adenopathy.  No abscess.  Post subtotal colectomy with right 

lower quadrant ileostomy.


 








Chest CT  02/16/19 10:44


IMPRESSION:  Bilateral lower lobe collapse and consolidation with small pleural 

effusions


Small amount of postoperative free intraperitoneal air.  No intra-abdominal or 

pelvic masses or adenopathy.  No abscess.  Post subtotal colectomy with right 

lower quadrant ileostomy.


 








Head CT  02/18/19 00:00


IMPRESSION:  Mild chronic microvascular ischemia.  No acute intracranial imaging

findings.  Possible right subcutaneous hematoma.


EVIDENCE OF ACUTE STROKE: NO.


 








Brain Flow Nuclear Medicine  02/18/19 10:00


IMPRESSION:  Absent cerebral hemisphere perfusion


 notified of results 1720 hours 2/18/2019


 








Chest X-Ray  02/19/19 06:00


IMPRESSION: 


 


Worsening aeration of the lungs.


 














Assessment & Plan





- Diagnosis


(1) Septic shock


Is this a current diagnosis for this admission?: Yes   


Plan: 


She has evidence of multisystem organ failure, but there are some signs of 

improvement.  Her creatinine has improved and she has had improvement in her b

lood pressure.  She is on broad-spectrum antibiotic coverage for likely 

organisms.  Liver enzymes and bilirubin continue to trend down.  She is off the 

Cardizem drip and her heart rate is holding stable.  She got a couple more units

of blood yesterday.  Fluid from the DAY drains was negative for creatinine.  Felt

to be unlikely that she has a ureteral injury that is draining urine into the 

belly.  She is getting dialysis and is being followed by nephrology.





She had a CT of the chest abdomen and pelvis, which showed some pleural 

effusions, which are to be expected from her volume overload, but the 

possibility for pneumonia could not be ruled out.  At this point, she has good 

gram-negative coverage with Zosyn, and good anaerobic coverage with Flagyl.  Low

suspicion for the need for antifungals.  Therefore, if any further coverage 

would be needed, it would be gram-positive coverage.  There is nothing from her 

cultures that would indicate gram-positive organism.  A lot of what were seen in

her chest could be from fluid overload, but if we decide to treat for gram 

positives, there are multiple considerations here.  Ideally, we would use 

daptomycin, but daptomycin is not indicated for pneumonia.  We could use 

vancomycin, but given her renal issues, which could be made even worse by the 

fact that she is on Zosyn, this is not a completely attractive option.  The 

other option would be Zyvox, but with her platelets, and some of the oozing of 

blood she has had, this option is also not without risk.  We will hold off for 

now pending the outcome of her surgical procedure today.








2/18/2019-overall prognosis is poor condition is critical patient is in septic 

shock.  The peritoneal abdominal tissue cultures are pending.  Chest x-ray shows

bilateral lower thoracic opacification/effusions.  The cultures from abdominal 

tissue on 216 shows gram-positive cocci in chains.  Cultures from 2/11/2019 

shows Clostridium.  Patient is presently on Flagyl and Zosyn.  T-max is 97.5.  

Patient is going for a brain scan today.  Plan is to continue the present 

management today.





2/19/2019-per the family request we going to stop IV antibiotic therapy and 

daily labs including chest x-rays.  Patient is going to be on comfort care 

measures only.








(2) Acute kidney failure with tubular necrosis


Is this a current diagnosis for this admission?: Yes   


Plan: 


2/18/2019-patient developed acute kidney failure most likely secondary to septic

shock.  Patient had a dialysis on Friday she is going to have another session of

dialysis today.  Nephrology on board.  Overall prognosis is poor condition is 

critical.  There is no urinary output.





2/19/2019 patient has a dialysis session yesterday due to acute kidney injury 

with tubular necrosis secondary to septic shock.  I spoke to Dr. Dacosta notified

him that patient is going to be on comfort care measures only.  No more dialysis

sessions.








(3) Colonic fistula


Is this a current diagnosis for this admission?: Yes   


Plan: 


2/18/2019-patient has colonic fistula.  She has history of ulcerative colitis 

with multiple complications as a result result of it.  Patient went for 

exploratory laparotomy yesterday has a omentectomy, revision of ileostomy.  Plan

is as per surgical recommendations.





2/19/2019-patient recently had a exploratory laparotomy for omentectomy, 

revision of the ileostomy.  Because of the overall poor prognosis critical 

condition on brain scan shows anoxic encephalopathy failed apnea test family 

decided and requested for comfort care measures only.


Plan is to follow the family request.








- Time


Time Spent with patient: 25-34 minutes


Medications reviewed and adjusted accordingly: Yes


Anticipated discharge: Hospice

## 2019-02-19 NOTE — RADIOLOGY REPORT (SQ)
CLINICAL HISTORY:  sepsis 



COMPARISON: February 18, 2019.



TECHNIQUE: XR CHEST 1 VIEW 2/19/2019 6:00 AM CST



FINDINGS: 



The heart is borderline in size. There is bibasilar airspace

disease. There are small pleural effusions. There is no

pneumothorax. There are no acute osseous findings. Right

subclavian central line, endotracheal and nasogastric tubes are

unchanged.



IMPRESSION: 



Worsening aeration of the lungs.

## 2019-02-20 NOTE — EEG PRO FEE REPORT
EEG INTERPRETATION



PATIENT NAME: JARVIS WELCH

MRN: [id number]      [id number]   ROOM[facility]  [id number]

ORDER#: [id number]

DATE OF STUDY:  2019                    : 1960

REFERRING MD:  STEF CIFUENTES M.D.





Reason for study:  Routine study to evaluate for hypoxic brain injury



MEDICATIONS:  Sol-Cortef, Xopenex, Flagyl, Morphine, Tazobactam,

Sucralfate, Mucomyst, Pantoprazole







History

This is a 58 year old female with a history of pulmonary fibrosis,

diverticulitis, migraines, ulcerative colitis, Crohns disease.  This EEG

was requested for evaluation for hypoxic brain injury.







EEG Interpretation

This EEG was recorded in the ICU and the patient is intubated.  There were

no apparent spontaneous eye openings or closing, but the EEG tech manually

opened and closed the patient's eyes multiple times.  There is not a clear

awake state, but the EEG is reactive after stimuli.



The background EEG is globally low amplitude with predominantly 2-5 uV of

EEG activity.  After stimulation, the EEG shows diffuse polymorphic low

amplitude 4-6 Hz theta activity with intermixed polymorphic 1-3 Hz delta

activity.  There are periods of global amplitude suppression and

attenuation, but this is not a burst suppression pattern.  With eye

closure there is no occipital dominant rhythm present.  There were no

obvious asymmetries in amplitude or frequencies.  Photic stimulation was

done and photic driving was noted at 9 Hz.



There were poorly formed fragments of stage II sleep including poorly

formed vertex waves and sleep spindles.  There was no epileptiform

activity (meaning no sharp waves or spikes), and there were no seizures

noted.  The EKG showed regular rhythm with rates typically in the 80 to

100 range.







EEG Impression

This is a markedly abnormal EEG and consistent with diffuse cerebral

dysfunction which is non-specific for etiology.  Likely considerations

would include diffuse hypoxic injury, toxic metabolic derangement,

hypothermia (patient's temperature was not noted), drug intoxication, or

post ictal state.  It is noted that versed was discontinued on 2019 and is likely not the culprit.  However morphine is noted on the

patient's medication list, but the date and time of the last dose are not

available to me.  There were no EEG findings consistent with epilepsy, but

if there is strong concern for clinical or subclinical seizure activity

then long term EEG monitoring would be advised or additional repeat

routine EEGs if long term monitoring is not available.









INTERPRETING PHYSICIAN: KATHY OVIEDO M.D.





/:  MTEFFT     DT:  2019 TT:  0839      ID:  [id number]

/:  205       DD:  2019 TD:  1425     JOB:  [id number]



cc:AMBROSIO CALVO M.D. GIOVANNI SALERNO, M.D.

>





MTDD

## 2019-02-20 NOTE — DISCHARGE SUMMARY E
Discharge Summary



NAME: JARVIS WELCH

MRN:  [id number]        : 1960     AGE: 58Y

ADMITTED: 2019                  DISCHARGED: 2019



The patient  on 2019.



FINAL DIAGNOSES:

1.  Sepsis.

2.  Ulcerative colitis, status post drainage of intraabdominal pelvic

abscess.



PROCEDURE:

The patient underwent an exploratory laparotomy, a subtotal colectomy with

ileostomy and Kavya pouch on 2019.  Placement of central venous

line and trialysis catheter on 2019, exploratory laparotomy on

2019 together with an additional ileostomy and omentectomy.



HOSPITAL COURSE:

This is a 58-year-old female admitted for abdominal pain, nausea on

2019.  The patient has a history of ulcerative colitis with

perforation and intraabdominal pelvic abscess drained on 01/10/2019.  She

was admitted, was initially on the floor and her general condition

deteriorated to the point that on 2019, the patient was taken to

surgery.  Underwent a subtotal colectomy with an end ileostomy and

Kavya pouch.  Postoperatively, the patient did very poorly.  She

remained hypotensive for several days.  She was _______ at a point where

she was on 3 pressors, so she remained intubated and sedated.  In

addition, her kidney function deteriorated and she became anuric with an

initial normal BUN and creatinine and subsequently, both BUN and

creatinine slowly increased.  The patient was then started on hemodialysis

on 2019 and at the same time, her sedation was discontinued and was

never resumed.  Her general conditions continued to deteriorate with an

increase in white blood cell count as high as 32,000.  The patient

underwent a CT scan of the chest on 2019 revealing bilateral lower

lobe collapse with fluid collection and a CT scan of the abdomen revealed

only postoperative changes without obvious pathology.  However, on

2019, the patient was taken back to surgery and underwent an

exploratory laparotomy, which was overall negative for intraabdominal

pathology, revision of ileostomy and omentectomy due to the omentum, which

appeared to be ischemic.  The patient was then transferred to the ICU and

her general conditions did not improve with still elevated white blood

cell count.  She was pancultured as well on 2019 and all the

cultures were negative.  Eventually, on 2019, the patient still

remained motionless and unresponsive.  She underwent an apnea test x2,

which was positive, as well as a brain scan, which revealed no brain

perfusion.  The family was therefore approached.  In the meantime, an EEG

was done, but preliminary reading was ____ for no brain activity.  In

light of the results of the apnea test, EEG preliminary results, and no

brain perfusion on the brain scan, the family was approached and following

a long discussion, the decision was made to perform a terminal extubation

and to make the patient comfort care and to stop all the treatment.  On

2019 with the family members present, the patient underwent terminal

extubation and she  shortly after in the early afternoon.





DICTATING PHYSICIAN:  STEF CIFUENTES M.D.





[id number]                  DT: 2019    0625

PHY#: [id number]            DD: 2019    1320

ID:   [id number]           JOB#: [id number]       ACCT: [id number]



cc:MAGDIEL CORONA M.D., GIOVANNI M.D.

>

## 2019-02-21 NOTE — PDOC PROGRESS REPORT
Subjective


Progress Note for:: 02/19/19


Subjective:: 





Intubated sedated plans for terminal extubation


Reason For Visit: 


DIVERTICULITIS,SIGMOID AND DESCENDING COLON,INTRA-








Physical Exam


Vital Signs: 


                                        











Temp Pulse Resp BP Pulse Ox


 


 97.0 F   93   20   135/75 H  100 


 


 02/19/19 08:42  02/19/19 08:42  02/19/19 08:42  02/19/19 08:42  02/19/19 08:42








                                 Intake & Output











 02/18/19 02/19/19 02/20/19





 06:59 06:59 06:59


 


Intake Total 8182 2416 


 


Output Total 6106 1866 


 


Balance 2052 550 


 


Weight 94.3 kg 92.4 kg 











General appearance: PRESENT: no acute distress, disheveled, morbidly obese.  

ABSENT: cooperative


Head exam: PRESENT: atraumatic, normocephalic


Eye exam: PRESENT: conjunctiva pale.  ABSENT: EOMI, nystagmus


Mouth exam: PRESENT: dry mucosa, neck supple, tongue midline, other - ET tube in

place


Neck exam: ABSENT: carotid bruit, JVD, lymphadenopathy, thyromegaly, tracheal 

deviation, tracheostomy


Respiratory exam: PRESENT: decreased breath sounds, prolonged expiratory phas, 

rales, rhonchi, unlabored


Cardiovascular exam: PRESENT: RRR, +S1, +S2


Pulses: PRESENT: normal radial pulses


GI/Abdominal exam: PRESENT: other - Status post surgery x2


Gentrourinary exam: PRESENT: indwelling catheter


Extremities exam: PRESENT: pedal edema.  ABSENT: calf tenderness, clubbing, full

ROM, joint swelling


Musculoskeletal exam: ABSENT: ambulatory, deformity, dislocation


Neurological exam: ABSENT: altered, awake


Skin exam: PRESENT: dry, warm





Results


Laboratory Results: 


                                        





                                 02/19/19 04:30 





                                 02/19/19 04:30 





                                        











  02/15/19 02/18/19 02/18/19





  10:33 12:25 12:37


 


WBC   


 


RBC   


 


Hgb   


 


Hct   


 


MCV   


 


MCH   


 


MCHC   


 


RDW   


 


Plt Count   


 


Seg Neutrophils %   


 


Lymphocytes %   


 


Monocytes %   


 


Eosinophils %   


 


Basophils %   


 


Absolute Neutrophils   


 


Absolute Lymphocytes   


 


Absolute Monocytes   


 


Absolute Eosinophils   


 


Absolute Basophils   


 


Carbonic Acid   


 


HCO3/H2CO3 Ratio   


 


ABG pH   


 


ABG pCO2   


 


ABG pO2   


 


ABG HCO3   


 


ABG O2 Saturation   


 


ABG Base Excess   


 


FiO2   


 


Sodium   


 


Potassium   


 


Chloride   


 


Carbon Dioxide   


 


Anion Gap   


 


BUN   


 


Creatinine   


 


Est GFR ( Amer)   


 


Est GFR (Non-Af Amer)   


 


Glucose   


 


Lactic Acid   1.8 


 


Calcium   


 


Magnesium   


 


Total Bilirubin   


 


AST   


 


ALT   


 


Alkaline Phosphatase   


 


Total Protein   


 


Albumin   


 


Triglycerides   


 


Blood Type  O POSITIVE   O POSITIVE


 


Antibody Screen  NEGATIVE   NEGATIVE














  02/19/19 02/19/19 02/19/19





  04:30 04:30 04:30


 


WBC   30.5 H* 


 


RBC   2.85 L 


 


Hgb   8.6 L 


 


Hct   25.9 L 


 


MCV   91 


 


MCH   30.2 


 


MCHC   33.3 


 


RDW   15.8 H 


 


Plt Count   91 L 


 


Seg Neutrophils %   Not Reportable 


 


Lymphocytes %   Not Reportable 


 


Monocytes %   Not Reportable 


 


Eosinophils %   Not Reportable 


 


Basophils %   Not Reportable 


 


Absolute Neutrophils   Not Reportable 


 


Absolute Lymphocytes   Not Reportable 


 


Absolute Monocytes   Not Reportable 


 


Absolute Eosinophils   Not Reportable 


 


Absolute Basophils   Not Reportable 


 


Carbonic Acid  0.75 L  


 


HCO3/H2CO3 Ratio  28:1  


 


ABG pH  7.55 H  


 


ABG pCO2  24.8 L  


 


ABG pO2  83.3  


 


ABG HCO3  21.2  


 


ABG O2 Saturation  97.5  


 


ABG Base Excess  -0.5  


 


FiO2  28%  


 


Sodium    135.0 L


 


Potassium    3.8


 


Chloride    102


 


Carbon Dioxide    23


 


Anion Gap    10


 


BUN    43 H


 


Creatinine    1.42 H


 


Est GFR ( Amer)    46 L


 


Est GFR (Non-Af Amer)    38 L


 


Glucose    226 H


 


Lactic Acid   


 


Calcium    7.2 L


 


Magnesium    2.0


 


Total Bilirubin    2.8 H


 


AST    34


 


ALT    122 H


 


Alkaline Phosphatase    200 H


 


Total Protein    3.2 L


 


Albumin    1.6 L


 


Triglycerides    151 H


 


Blood Type   


 


Antibody Screen   








                                        





02/16/19 11:40   Tracheal Aspirate   Gram Stain - Final


02/16/19 11:40   Tracheal Aspirate   Sputum Culture - Final


                            Stenotrophomonas Maltophilia


                            Normal Monik Absent


02/16/19 11:42   Abdomen - Incision Site   Gram Stain - Final


02/16/19 11:42   Abdomen - Incision Site   Wound Culture - Final


                            Enterococcus Faecalis(Group D)


                            Yeast, Not Candida Albicans


                            No Anaerobic Organisms


02/16/19 11:50   Whiteside Catheter   Urine Culture - Final


                            Yeast, Not Candida Albicans





                                        











  02/13/19 02/13/19





  06:04 06:04


 


Creatine Kinase  136 H 


 


CK-MB (CK-2)   3.43


 


Troponin I   0.033











Impressions: 


                                        





Abdomen Ultrasound  02/07/19 00:00


IMPRESSION:


 


No acute sonographic abnormality.


 








Catheter Patency X-Ray  02/08/19 00:00


IMPRESSION:  SMALL RESIDUAL ABSCESS CAVITY.  FISTULOUS COMMUNICATION WITH THE 

DISTAL DESCENDING COLON WITH COLONIC FINDINGS AS DESCRIBED ABOVE.


 








Fluoroscopy  02/08/19 00:00


IMPRESSION:  SMALL RESIDUAL ABSCESS CAVITY.  FISTULOUS COMMUNICATION WITH THE 

DISTAL DESCENDING COLON WITH COLONIC FINDINGS AS DESCRIBED ABOVE.


 








Abdomen/Pelvis CT  02/16/19 10:44


IMPRESSION:  Bilateral lower lobe collapse and consolidation with small pleural 

effusions


Small amount of postoperative free intraperitoneal air.  No intra-abdominal or 

pelvic masses or adenopathy.  No abscess.  Post subtotal colectomy with right 

lower quadrant ileostomy.


 








Chest CT  02/16/19 10:44


IMPRESSION:  Bilateral lower lobe collapse and consolidation with small pleural 

effusions


Small amount of postoperative free intraperitoneal air.  No intra-abdominal or 

pelvic masses or adenopathy.  No abscess.  Post subtotal colectomy with right 

lower quadrant ileostomy.


 








Head CT  02/18/19 00:00


IMPRESSION:  Mild chronic microvascular ischemia.  No acute intracranial imaging

findings.  Possible right subcutaneous hematoma.


EVIDENCE OF ACUTE STROKE: NO.


 








Brain Flow Nuclear Medicine  02/18/19 10:00


IMPRESSION:  Absent cerebral hemisphere perfusion


 notified of results 1720 hours 2/18/2019


 








Chest X-Ray  02/19/19 06:00


IMPRESSION: 


 


Worsening aeration of the lungs.


 














Assessment & Plan





- Diagnosis


(1) Acute kidney failure with tubular necrosis


Is this a current diagnosis for this admission?: Yes   


Plan: 


Comfort care








(2) Septic shock


Is this a current diagnosis for this admission?: Yes   


Plan: 


Comfort care








(3) Abdominal abscess


Is this a current diagnosis for this admission?: Yes   


Plan: 


Per surgery


                                                                                











 02/11/19 18:30 Blood Culture - Final





 Blood      Clostridium Sp.not Perfringens


 


 02/11/19 14:28 Blood Culture - Final





 Blood      Clostridium Sp.not Perfringens


 


 02/07/19 01:40 Gram Stain - Final





 Abdomen - Abscess Wound Culture - Final





      Escherichia Coli





      Enterococcus Faecalis(Group D)





      Prevotella Species





      Skin Monik


 


 02/06/19 16:00 Blood Culture - Final





 Blood      Clostridium Sp.not Perfringens














(4) Thrombocytopenia


Is this a current diagnosis for this admission?: Yes   





- Time


Total Critical Time (Minutes): 50

## 2019-07-12 NOTE — PDOC PROGRESS REPORT
Subjective


Progress Note for:: 02/18/19


Subjective:: 





Intubated, not sedated, not responsive


Reason For Visit: 


DIVERTICULITIS,SIGMOID AND DESCENDING COLON,INTRA-








Physical Exam


Vital Signs: 


                                        











Temp Pulse Resp BP Pulse Ox


 


 97.5 F   105 H  16   114/69   99 


 


 02/18/19 07:00  02/18/19 02:46  02/18/19 07:00  02/18/19 04:34  02/18/19 07:00








                                 Intake & Output











 02/17/19 02/18/19 02/19/19





 06:59 06:59 06:59


 


Intake Total 2996 8182 


 


Output Total 2658 6156 


 


Balance 338 2052 


 


Weight 93 kg 94.3 kg 











General appearance: PRESENT: obese, other - not responsive toverbal or physical 

stimuli


Respiratory exam: PRESENT: clear to auscultation umu


Cardiovascular exam: PRESENT: RRR


GI/Abdominal exam: PRESENT: soft, other - 1) midlline incision vcovered with 

WoundVac 2) Drain fille with serosanguinois fluid 3) no Bowel sounds


Extremities exam: PRESENT: +2 edema - all 4 extremities





Results


Laboratory Results: 


                                        





                                 02/18/19 04:20 





                                 02/18/19 04:20 





                                        











  02/15/19 02/17/19 02/18/19





  10:33 19:20 04:20


 


WBC   28.1 H 


 


RBC   2.79 L 


 


Hgb   8.4 L 


 


Hct   25.3 L 


 


MCV   91 


 


MCH   30.3 


 


MCHC   33.4 


 


RDW   16.4 H 


 


Plt Count   92 L 


 


Seg Neutrophils %   Not Reportable 


 


Lymphocytes %   Not Reportable 


 


Monocytes %   Not Reportable 


 


Eosinophils %   Not Reportable 


 


Basophils %   Not Reportable 


 


Absolute Neutrophils   Not Reportable 


 


Absolute Lymphocytes   Not Reportable 


 


Absolute Monocytes   Not Reportable 


 


Absolute Eosinophils   Not Reportable 


 


Absolute Basophils   Not Reportable 


 


Carbonic Acid   


 


HCO3/H2CO3 Ratio   


 


ABG pH   


 


ABG pCO2   


 


ABG pO2   


 


ABG HCO3   


 


ABG O2 Saturation   


 


ABG Base Excess   


 


FiO2   


 


Sodium   


 


Potassium   


 


Chloride   


 


Carbon Dioxide   


 


Anion Gap   


 


BUN   


 


Creatinine   


 


Est GFR ( Amer)   


 


Est GFR (Non-Af Amer)   


 


Glucose   


 


Calcium   


 


Phosphorus   


 


Magnesium   


 


Total Bilirubin   


 


AST   


 


ALT   


 


Alkaline Phosphatase   


 


Ammonia    < 8.7 L


 


Total Protein   


 


Albumin   


 


Prealbumin   


 


Blood Type  O POSITIVE  


 


Antibody Screen  NEGATIVE  














  02/18/19 02/18/19 02/18/19





  04:20 04:20 04:20


 


WBC   31.7 H* 


 


RBC   2.61 L 


 


Hgb   8.0 L 


 


Hct   23.7 L 


 


MCV   91 


 


MCH   30.6 


 


MCHC   33.7 


 


RDW   16.8 H 


 


Plt Count   100 L 


 


Seg Neutrophils %   Not Reportable 


 


Lymphocytes %   Not Reportable 


 


Monocytes %   Not Reportable 


 


Eosinophils %   Not Reportable 


 


Basophils %   Not Reportable 


 


Absolute Neutrophils   Not Reportable 


 


Absolute Lymphocytes   Not Reportable 


 


Absolute Monocytes   Not Reportable 


 


Absolute Eosinophils   Not Reportable 


 


Absolute Basophils   Not Reportable 


 


Carbonic Acid  1.29  


 


HCO3/H2CO3 Ratio  17:1  


 


ABG pH  7.34 L  


 


ABG pCO2  42.8  


 


ABG pO2  94.0  


 


ABG HCO3  22.5  


 


ABG O2 Saturation  96.8  


 


ABG Base Excess  -3.1  


 


FiO2  28%  


 


Sodium    136.7 L


 


Potassium    5.0


 


Chloride    104


 


Carbon Dioxide    23


 


Anion Gap    10


 


BUN    56 H


 


Creatinine    1.87 H


 


Est GFR ( Amer)    33 L


 


Est GFR (Non-Af Amer)    28 L


 


Glucose    169 H


 


Calcium    7.3 L


 


Phosphorus    4.0


 


Magnesium    2.0


 


Total Bilirubin    2.9 H


 


AST    39 H


 


ALT    173 H


 


Alkaline Phosphatase    191 H


 


Ammonia   


 


Total Protein    3.6 L


 


Albumin    1.9 L


 


Prealbumin    14.6 L


 


Blood Type   


 


Antibody Screen   








                                        











  02/13/19 02/13/19





  06:04 06:04


 


Creatine Kinase  136 H 


 


CK-MB (CK-2)   3.43


 


Troponin I   0.033











Impressions: 


                                        





Abdomen Ultrasound  02/07/19 00:00


IMPRESSION:


 


No acute sonographic abnormality.


 








Catheter Patency X-Ray  02/08/19 00:00


IMPRESSION:  SMALL RESIDUAL ABSCESS CAVITY.  FISTULOUS COMMUNICATION WITH THE 

DISTAL DESCENDING COLON WITH COLONIC FINDINGS AS DESCRIBED ABOVE.


 








Fluoroscopy  02/08/19 00:00


IMPRESSION:  SMALL RESIDUAL ABSCESS CAVITY.  FISTULOUS COMMUNICATION WITH THE 

DISTAL DESCENDING COLON WITH COLONIC FINDINGS AS DESCRIBED ABOVE.


 








Abdomen/Pelvis CT  02/16/19 10:44


IMPRESSION:  Bilateral lower lobe collapse and consolidation with small pleural 

effusions


Small amount of postoperative free intraperitoneal air.  No intra-abdominal or 

pelvic masses or adenopathy.  No abscess.  Post subtotal colectomy with right 

lower quadrant ileostomy.


 








Chest CT  02/16/19 10:44


IMPRESSION:  Bilateral lower lobe collapse and consolidation with small pleural 

effusions


Small amount of postoperative free intraperitoneal air.  No intra-abdominal or 

pelvic masses or adenopathy.  No abscess.  Post subtotal colectomy with right 

lower quadrant ileostomy.


 








Chest X-Ray  02/18/19 06:00


IMPRESSION:


 


No significant change.


 














Assessment & Plan





- Diagnosis


(1) Colonic fistula


Is this a current diagnosis for this admission?: Yes   





(2) Diverticulitis large intestine


Qualifiers: 


   Diverticulitis bleeding: without bleeding   Diverticulitis complication: with

perforation and abscess   Qualified Code(s): K57.20 - Diverticulitis of large 

intestine with perforation and abscess without bleeding   


Is this a current diagnosis for this admission?: Yes   





(3) Abscess of abdominal cavity


Is this a current diagnosis for this admission?: Yes   





- Plan Summary


Plan Summary: 





A/





POD#7 after total colectomy


POD#1 after exploratory laparotomy. omentectomy, abdominal wound debridment, 

revision of ileostomy


Respiratory= on SIMV 8/min, no change; acceptable ABG's


CV= HD stable, no tachicardia


= anuria , UO < 45 mL/24 hr


GI:  small bowel appeared well perfused and w/o pathology at laparotomy 

yestedrday, ileostomy was revised, no output yet


FEN:  on dialysis, on TPN


ID:  Wound gram stain negative for fungal; other recent cx from 2/16/19 (urine, 

bllod, and sputum) are negative so far; continue abx (Zosyn/Flagyl)


Neuro:  patient has been off sedation since 2/14/19 and patient does not show 

any evidence of spontaneous neurological activity; concerned about possible 

hypoxic brain injury suffered postoperatively while septic and hypotensive on 

multiple pressors








P/





EEG


Brain perfusion scan


Apnea test


continue other management as before


Possible bedside bronchoscopy as per Pulmunologist today grossly 3+/5 throughout

## 2019-08-01 NOTE — RADIOLOGY REPORT (SQ)
EXAM DESCRIPTION:  CHEST 2 VIEWS



COMPLETED DATE/TIME:  1/9/2019 5:27 pm



REASON FOR STUDY:  colitis. Need for high risk meds



COMPARISON:  Two-view chest 2/20/2015



EXAM PARAMETERS:  NUMBER OF VIEWS: two views

TECHNIQUE: Digital Frontal and Lateral radiographic views of the chest acquired.

RADIATION DOSE: NA

LIMITATIONS: none



FINDINGS:  LUNGS AND PLEURA: No opacities, masses or pneumothorax. No pleural effusion.

MEDIASTINUM AND HILAR STRUCTURES: No masses or contour abnormalities.

HEART AND VASCULAR STRUCTURES: Heart normal size.  No evidence for failure.

BONES: No acute findings.

HARDWARE: None in the chest.  Clips right upper quadrant post cholecystectomy

OTHER: Gaseous distention of the colon under the left hemidiaphragm.



IMPRESSION:  NO ACUTE RADIOGRAPHIC FINDING IN THE CHEST.



TECHNICAL DOCUMENTATION:  JOB ID:  [id number]

 2011 [employer]- All Rights Reserved



Reading location - IP/workstation name: KIN Spoke to Oniel at [facility] ([phone]) and she states this case was denied b/c they had an approval for the same CPT code 25865 from 6/2019 and pt never underwent the study by Dr. Raúl Hollis.  I reviewed that Dr. Allie Martin ordered US liver elastography (35484/30

## 2024-07-28 NOTE — ER DOCUMENT REPORT
ED General





- General


Chief Complaint: Abdominal Pain


Stated Complaint: ABDOMINAL PAIN


Time Seen by Provider: 01/09/19 12:16


Mode of Arrival: Ambulatory


Information source: Patient


Notes: 





58-year-old female with ulcerative colitis, pulmonary fibrosis, glaucoma, 

rheumatoid arthritis, headaches presents with complaint of left upper and left 

lower quadrant abdominal pain that has been ongoing for 4 months with worsening 

of pain over the last week.  Patient is being seen by Dr. Karla edwards

rologist who sent patient in for admission.  She states she has had a recent 

colonoscopy in December 2018.  She was sent here by him with request for labs, 

fluids and a CAT scan as well as admission.  Patient states that she has had 

multiple episodes of bloody diarrhea.  This is not new for her but she states 

the pain has worsened.


TRAVEL OUTSIDE OF THE [place] IN LAST 30 DAYS: No





- HPI


Onset: Other


Onset/Duration: Persistent, Worse


Quality of pain: Cramping


Severity: Moderate


Associated symptoms: Diarrhea, Other.  denies: Vomiting


Exacerbated by: Denies


Relieved by: Denies


Similar symptoms previously: Yes


Recently seen / treated by doctor: Yes





- Related Data


Allergies/Adverse Reactions: 


                                        





pineapple Allergy (Mild, Verified 01/09/19 12:11)


   


latex [Latex] Allergy (Verified 01/09/19 11:43)


   itching


nickel [Nickel] Allergy (Verified 01/09/19 11:43)


   ITCHY RASH


sulfamethoxazole [From Bactrim] Allergy (Verified 01/09/19 11:43)


   red rash


trimethoprim [From Bactrim] Allergy (Verified 01/09/19 11:43)


   red rash


MERCURY Allergy (Uncoded 01/09/19 11:43)


   ITCHY RASH











Past Medical History





- General


Information source: Patient, Dr. Office, [facility] Records





- Social History


Smoking Status: Former Smoker


Chew tobacco use (# tins/day): No


Frequency of alcohol use: Rare


Drug Abuse: None


Lives with: Family


Family History: Reviewed & Not Pertinent


Patient has suicidal ideation: No


Patient has homicidal ideation: No





- Past Medical History


Cardiac Medical History: 


   Denies: Hx Coronary Artery Disease, Hx Heart Attack, Hx Hypertension


Pulmonary Medical History: 


   Denies: Hx Asthma, Hx Bronchitis, Hx COPD, Hx Pneumonia


Neurological Medical History: Denies: Hx Cerebrovascular Accident, Hx Seizures


Renal/ Medical History: Denies: Hx Peritoneal Dialysis


GI Medical History: Denies: Hx Hepatitis, Hx Hiatal Hernia, Hx Ulcer


Musculoskeletal Medical History: Reports Hx Arthritis - psoriatic, Reports Hx 

Musculoskeletal Trauma - Right knee fracture 10 years ago


Skin Medical History: Reports Hx Psoriasis


Psychiatric Medical History: Reports: Hx Depression


Infectious Medical History: Denies: Hx Hepatitis


Past Surgical History: Reports: Hx Abdominal Surgery - hernia repair, Hx Breast 

Surgery - right breast lump removed, Hx Cholecystectomy, Hx Hysterectomy, Hx 

Orthopedic Surgery - right knee, left hand.  Denies: Hx Mastectomy, Hx Open 

Heart Surgery





- Immunizations


Hx Diphtheria, Pertussis, Tetanus Vaccination: Yes





Review of Systems





- Review of Systems


Notes: 





REVIEW OF SYSTEMS:


CONSTITUTIONAL :  Denies fever,  chills, or sweats.  Denies recent illness. 

Denies weight loss, recent hospitalizations. 


EENT: Denies visual changes, eye pain.  Denies sore throat, oral lesions, 

difficulty swallowing.


CARDIOVASCULAR:  Denies chest pain.  Denies palpitations. Denies lower extremity

edema.


RESPIRATORY:  Denies cough. Denies shortness of breath, wheezing.


GASTROINTESTINAL:   Denies nausea, vomiting.  Denies blood in vomitus,  Denies 

black, tarry stools.  Denies constipation.  


GENITOURINARY:  Denies difficulty urinating, painful urination,  frequency, 

blood in urine, or  vaginal discharge.


MUSCULOSKELETAL:  Denies back or neck pain or stiffness.  Denies joint pain or 

swelling.


SKIN:   Denies rash, lesions or sores.


HEMATOLOGIC :   Denies easy bruising or bleeding.


LYMPHATIC:  Denies swollen glands.


NEUROLOGICAL:  Denies confusion or altered mental status.  Denies loss of 

consciousness.  Denies headache.  Denies weakness or paralysis.  Denies problems

difficulty with ambulation, slurred speech.  Denies sensory loss, numbness, or 

tingling.  Denies seizures.


PSYCHIATRIC:  Denies anxiety or stress.  Denies depression, suicidal ideation, 

or homicidal ideation.  Denies visual or auditory hallucinations.








Physical Exam





- Vital signs


Vitals: 


                                        











Temp Pulse Resp BP Pulse Ox


 


 98.4 F   92   16   125/75   97 


 


 01/09/19 12:05  01/09/19 12:05  01/09/19 12:05  01/09/19 12:05  01/09/19 12:05














- Notes


Notes: 





PHYSICAL EXAMINATION:





GENERAL: Well-appearing, well-nourished and in no acute distress.





HEAD: Atraumatic, normocephalic.





EYES: Pupils equal round and reactive to light, extraocular movements intact, 

conjunctiva are normal.





ENT: Nares patent, oropharynx clear without exudates.  Moist mucous membranes.





NECK: Normal range of motion, supple without lymphadenopathy





LUNGS: Breath sounds clear to auscultation bilaterally and equal.  No wheezes 

rales or rhonchi.





HEART: Regular rate and rhythm without murmurs





ABDOMEN: Left upper quadrant abdominal pain with palpation.  No guarding, no 

rebound.  No masses appreciated.





Female : deferred





Musculoskeletal: Normal range of motion, no pitting or edema.  No cyanosis.





NEUROLOGICAL: Cranial nerves grossly intact.  Normal speech, normal gait.  

Normal sensory, motor exams





PSYCH: Normal mood, normal affect.





SKIN: Warm, Dry, normal turgor, no rashes or lesions noted.





Course





- Re-evaluation


Re-evalutation: 





Laboratory











  01/09/19 01/09/19 01/09/19





  12:39 13:33 13:33


 


WBC   13.7 H 


 


RBC   4.08 


 


Hgb   11.8 L 


 


Hct   35.5 L 


 


MCV   87 


 


MCH   28.9 


 


MCHC   33.2 


 


RDW   16.6 H 


 


Plt Count   616 H 


 


Seg Neutrophils %   88.3 H 


 


Lymphocytes %   8.5 L 


 


Monocytes %   3.1 


 


Eosinophils %   0.0 


 


Basophils %   0.1 


 


Absolute Neutrophils   12.1 H 


 


Absolute Lymphocytes   1.2 


 


Absolute Monocytes   0.4 


 


Absolute Eosinophils   0.0 


 


Absolute Basophils   0.0 


 


Sodium    132.1 L


 


Potassium    4.6


 


Chloride    92 L


 


Carbon Dioxide    32 H


 


Anion Gap    8


 


BUN    15


 


Creatinine    0.41 L


 


Est GFR ( Amer)    > 60


 


Est GFR (Non-Af Amer)    > 60


 


Glucose    108


 


Calcium    8.2 L


 


Total Bilirubin    0.6


 


Direct Bilirubin    0.2


 


Neonat Total Bilirubin    Not Reportable


 


Neonat Direct Bilirubin    Not Reportable


 


Neonat Indirect Bili    Not Reportable


 


AST    14


 


ALT    32


 


Alkaline Phosphatase    257 H


 


Total Protein    4.8 L


 


Albumin    2.6 L


 


Lipase    < 10.0 L


 


Urine Color  CHLOÉ  


 


Urine Appearance  SLIGHTLY-CLOUDY  


 


Urine pH  5.0  


 


Ur Specific Gravity  1.038  


 


Urine Protein  100 H  


 


Urine Glucose (UA)  NEGATIVE  


 


Urine Ketones  NEGATIVE  


 


Urine Blood  NEGATIVE  


 


Urine Nitrite  NEGATIVE  


 


Urine Bilirubin  SMALL H  


 


Urine Urobilinogen  2.0 H  


 


Ur Leukocyte Esterase  TRACE H  


 


Urine WBC (Auto)  3  


 


Urine RBC (Auto)  1  


 


U Hyaline Cast (Auto)  18  


 


Urine Bacteria (Auto)  TRACE  


 


Squamous Epi Cells Auto  4  


 


Urine Mucus (Auto)  MANY  


 


Urine Ascorbic Acid  40 H  











                                        





Chest X-Ray  01/09/19 00:00


IMPRESSION:  NO ACUTE RADIOGRAPHIC FINDING IN THE CHEST.


 








Abdomen/Pelvis CT  01/09/19 15:01


IMPRESSION:  1.  Sigmoid diverticulosis.  Cannot exclude mild associated 

inflammation.


2.  There is a large irregularly-shaped fluid collection in the lower abdomen/ 

upper pelvis as described.  This is concerning for an abscess.  The exact origin

is not clear.  Is there clinical evidence of or history of appendicitis?


 














                                        











Temp Pulse Resp BP Pulse Ox


 


 98.2 F   114 H  22 H  144/85 H  98 


 


 01/09/19 23:15  01/09/19 23:15  01/09/19 23:15  01/09/19 23:15  01/09/19 23:15








58-year-old female with ulcerative colitis, pulmonary fibrosis, glaucoma, 

rheumatoid arthritis, headaches presents with complaint of left upper and left 

lower quadrant abdominal pain that has been ongoing for 4 months with worsening 

of pain over the last week.  Patient is being seen by Dr. Acosta 

gastroenterologist who sent patient in for admission.  Vital signs reviewed upon

arrival and patient is afebrile but mildly tachycardic.  Patient does have 

abdominal pain with palpation to the left upper and lower quadrant but is 

without guarding or rebound.  CT was obtained and concerning for abdominal 

abscess.  Antibiotics of Cipro, Flagyl ordered by Dr. acosta.  Patient accepted 

by the hospitalist for admission.


01/09/19 15:20


Patient declining pain medication at this time.


01/09/19 17:03


Spoke to  who has already put in all of the medication that he wants the

patient to have.  As far as basic hospitalist's orders he will leave that up to 

the hospitalist.  Patient has been accepted by Dr. Martinez for admission.


01/10/19 02:56





01/10/19 02:57








- Vital Signs


Vital signs: 


                                        











Temp Pulse Resp BP Pulse Ox


 


 98.2 F   114 H  22 H  144/85 H  98 


 


 01/09/19 23:15  01/09/19 23:15  01/09/19 23:15  01/09/19 23:15  01/09/19 23:15














- Laboratory


Result Diagrams: 


                                 01/09/19 13:33





                                 01/09/19 13:33


Laboratory results interpreted by me: 


                                        











  01/09/19 01/09/19 01/09/19





  12:39 13:33 13:33


 


WBC   13.7 H 


 


Hgb   11.8 L 


 


Hct   35.5 L 


 


RDW   16.6 H 


 


Plt Count   616 H 


 


Seg Neutrophils %   88.3 H 


 


Lymphocytes %   8.5 L 


 


Absolute Neutrophils   12.1 H 


 


Sodium    132.1 L


 


Chloride    92 L


 


Carbon Dioxide    32 H


 


Creatinine    0.41 L


 


Calcium    8.2 L


 


Alkaline Phosphatase    257 H


 


C-Reactive Protein   


 


Total Protein    4.8 L


 


Albumin    2.6 L


 


Lipase    < 10.0 L


 


Urine Protein  100 H  


 


Urine Bilirubin  SMALL H  


 


Urine Urobilinogen  2.0 H  


 


Ur Leukocyte Esterase  TRACE H  


 


Urine Ascorbic Acid  40 H  














  01/09/19





  13:33


 


WBC 


 


Hgb 


 


Hct 


 


RDW 


 


Plt Count 


 


Seg Neutrophils % 


 


Lymphocytes % 


 


Absolute Neutrophils 


 


Sodium 


 


Chloride 


 


Carbon Dioxide 


 


Creatinine 


 


Calcium 


 


Alkaline Phosphatase 


 


C-Reactive Protein  174.4 H


 


Total Protein 


 


Albumin 


 


Lipase 


 


Urine Protein 


 


Urine Bilirubin 


 


Urine Urobilinogen 


 


Ur Leukocyte Esterase 


 


Urine Ascorbic Acid 














- Diagnostic Test


Radiology reviewed: Image reviewed, Reports reviewed





Discharge





- Discharge


Clinical Impression: 


 Colitis, Elevated alkaline phosphatase level, Hypoalbuminemia, Abdominal 

abscess





Ulcerative colitis


Qualifiers:


 Ulcerative colitis location: unspecified ulcerative colitis location Digestive 

disease complication type: with rectal bleeding Qualified Code(s): K51.919 - 

Ulcerative colitis, unspecified with unspecified complications





Leukocytosis


Qualifiers:


 Leukocytosis type: unspecified Qualified Code(s): D72.829 - Elevated white 

blood cell count, unspecified





Condition: Good


Disposition: ADMITTED AS INPATIENT


Admitting Provider: Hospitalist


Unit Admitted: Medical Floor
ED Medical Screen (RME)





- General


Chief Complaint: Abdominal Pain


Stated Complaint: ABDOMINAL PAIN


Time Seen by Provider: 01/09/19 12:16


Notes: 





58 years old female referred here by the gastroenterologist for admission.  The 

patient was having abdominal pain, diagnosed as new onset of colitis of unknown 

origin.


TRAVEL OUTSIDE OF THE [place] IN LAST 30 DAYS: No





- Related Data


Allergies/Adverse Reactions: 


                                        





pineapple Allergy (Mild, Verified 01/09/19 12:11)


   


latex [Latex] Allergy (Verified 01/09/19 11:43)


   itching


nickel [Nickel] Allergy (Verified 01/09/19 11:43)


   ITCHY RASH


sulfamethoxazole [From Bactrim] Allergy (Verified 01/09/19 11:43)


   red rash


trimethoprim [From Bactrim] Allergy (Verified 01/09/19 11:43)


   red rash


MERCURY Allergy (Uncoded 01/09/19 11:43)


   ITCHY RASH











Past Medical History





- Social History


Chew tobacco use (# tins/day): No


Frequency of alcohol use: Rare


Drug Abuse: None





- Past Medical History


Cardiac Medical History: 


   Denies: Hx Coronary Artery Disease, Hx Heart Attack, Hx Hypertension


Pulmonary Medical History: 


   Denies: Hx Asthma, Hx Bronchitis, Hx COPD, Hx Pneumonia


Neurological Medical History: Denies: Hx Cerebrovascular Accident, Hx Seizures


Renal/ Medical History: Denies: Hx Peritoneal Dialysis


GI Medical History: Denies: Hx Hepatitis, Hx Hiatal Hernia, Hx Ulcer


Musculoskeltal Medical History: Reports Hx Arthritis - psoriatic, Reports Hx 

Musculoskeletal Trauma - Right knee fracture 10 years ago


Skin Medical History: Reports Hx Psoriasis


Psychiatric Medical History: Reports: Hx Depression


Infectious Medical History: Denies: Hx Hepatitis


Past Surgical History: Reports: Hx Abdominal Surgery - hernia repair, Hx Breast 

Surgery - right breast lump removed, Hx Cholecystectomy, Hx Hysterectomy, Hx 

Orthopedic Surgery - right knee, left hand.  Denies: Hx Mastectomy, Hx Open 

Heart Surgery





- Immunizations


Hx Diphtheria, Pertussis, Tetanus Vaccination: Yes





Physical Exam





- Vital signs


Vitals: 





                                        











Temp Pulse Resp BP Pulse Ox


 


 98.4 F   92   16   125/75   97 


 


 01/09/19 12:05  01/09/19 12:05  01/09/19 12:05  01/09/19 12:05  01/09/19 12:05














Course





- Vital Signs


Vital signs: 





                                        











Temp Pulse Resp BP Pulse Ox


 


 98.4 F   92   16   125/75   97 


 


 01/09/19 12:05  01/09/19 12:05  01/09/19 12:05  01/09/19 12:05  01/09/19 12:05














Doctor's Discharge





- Discharge


Referrals: 


DEBBIE GUTIERREZ DO [Primary Care Provider] - Follow up as needed
normal for race
